# Patient Record
Sex: FEMALE | Race: WHITE | NOT HISPANIC OR LATINO | Employment: OTHER | URBAN - METROPOLITAN AREA
[De-identification: names, ages, dates, MRNs, and addresses within clinical notes are randomized per-mention and may not be internally consistent; named-entity substitution may affect disease eponyms.]

---

## 2020-01-16 ENCOUNTER — OFFICE VISIT (OUTPATIENT)
Dept: CARDIOLOGY | Facility: CLINIC | Age: 70
End: 2020-01-16

## 2020-01-16 VITALS
DIASTOLIC BLOOD PRESSURE: 84 MMHG | WEIGHT: 157.8 LBS | BODY MASS INDEX: 26.94 KG/M2 | HEIGHT: 64 IN | HEART RATE: 69 BPM | SYSTOLIC BLOOD PRESSURE: 140 MMHG

## 2020-01-16 DIAGNOSIS — R00.2 PALPITATIONS: Primary | ICD-10-CM

## 2020-01-16 DIAGNOSIS — R42 DIZZINESS: ICD-10-CM

## 2020-01-16 DIAGNOSIS — R42 ORTHOSTATIC LIGHTHEADEDNESS: ICD-10-CM

## 2020-01-16 PROCEDURE — 93000 ELECTROCARDIOGRAM COMPLETE: CPT | Performed by: INTERNAL MEDICINE

## 2020-01-16 PROCEDURE — 99204 OFFICE O/P NEW MOD 45 MIN: CPT | Performed by: INTERNAL MEDICINE

## 2020-01-16 NOTE — PROGRESS NOTES
Subjective:     Encounter Date:01/16/2020      Patient ID: Lidia Allen is a 69 y.o. female.    Chief Complaint:  History of Present Illness    This is a 69-year-old with a history of frequent PACs, rare PVCs, who presents for evaluation of positional lightheadedness, dizziness, palpitations, and shortness of breath with activity.    The patient was previously followed by Dr. David.  It appears that her issue started in 2/2019 when she was admitted to the hospital with near syncope following plasma donation.  This was felt to be due to orthostatic hypotension.  She was also noted to have PVCs on her EKG at that time so she was transferred to [facility] for further management and work-up.  She was treated with IV fluids and her symptoms improved including her blood pressures.  An echocardiogram was performed showing normal left ventricular systolic function and wall motion with grade 1 diastolic dysfunction and no significant valvular disease.  She then underwent a 48-hour Holter monitor in 3/2019 that showed frequent PACs with episodes of nonsustained ventricular tachycardia, and rare PVCs.  She reports at one point she was tried on atenolol but was unable to tolerate this due to low blood pressures.  She also underwent a stress test that showed evidence of ischemia and subsequently underwent a cardiac catheterization on 4/18/2019 that showed normal coronary arteries and normal left ventricular end-diastolic pressure.  She reports that over the last year she has been aggressive about staying well-hydrated.  She also has been very diligent about monitoring her electrolytes and treating them accordingly.  She is also changed her diet and has become a vegan.      She has noted some worsening dyspnea on exertion over the last year but also admits that she has not been quite as active.  She denies any chest pain.  The palpitations overall have improved.  However she continues to have issues with  lightheadedness primarily when she stands up or standing for a period of time.  She denies any recent syncope.  She also reports that she sometimes has issues with lightheadedness after walking for a period of time associated with a feeling like she is unable to lift her legs to continue walking.  When this happens she has to go lay down for her to recover.  She also reports episodes of dizziness that can occur with just turning her head in different directions.  She believes she gets dehydrated in the middle of the night.  She will wake up with a dry mouth and a headache.  During one such episode she checked her heart rate and oxygen saturation with a pulse oximeter and reports that her heart rates will dip down to 29 and then go back up into the 100s.  She had recent blood work performed showing that her sodium was low at 130, potassium was normal at 3.7, calcium was mildly low at 8.1, and her serum osmolality was low at 254.    Review of Systems   Constitution: Negative for malaise/fatigue.   HENT: Negative for hearing loss, hoarse voice, nosebleeds and sore throat.    Eyes: Negative for pain.   Cardiovascular: Positive for dyspnea on exertion and palpitations. Negative for chest pain, claudication, cyanosis, irregular heartbeat, leg swelling, near-syncope, orthopnea, paroxysmal nocturnal dyspnea and syncope.   Respiratory: Negative for shortness of breath and snoring.    Endocrine: Negative for cold intolerance, heat intolerance, polydipsia, polyphagia and polyuria.   Skin: Negative for itching and rash.   Musculoskeletal: Negative for arthritis, falls, joint pain, joint swelling, muscle cramps, muscle weakness and myalgias.   Gastrointestinal: Negative for constipation, diarrhea, dysphagia, heartburn, hematemesis, hematochezia, melena, nausea and vomiting.   Genitourinary: Negative for frequency, hematuria and hesitancy.   Neurological: Positive for dizziness, light-headedness and loss of balance. Negative for  "excessive daytime sleepiness, headaches, numbness and weakness.   Psychiatric/Behavioral: Negative for depression. The patient is not nervous/anxious.          Current Outpatient Medications:   •  Unable to find, 1 each 1 (One) Time. Med Name: magnesium solution, Disp: , Rfl:   •  Unable to find, 1 each 1 (One) Time. Med Name: recalcia calcium solution, Disp: , Rfl:     Past Medical History:   Diagnosis Date   • Arrhythmia    • Diastolic dysfunction    • Dizziness    • Near syncope    • Orthostatic hypotension    • PVC (premature ventricular contraction)        History reviewed. No pertinent surgical history.    History reviewed. No pertinent family history.    Social History     Tobacco Use   • Smoking status: Never Smoker   • Smokeless tobacco: Never Used   Substance Use Topics   • Alcohol use: Never     Frequency: Never   • Drug use: Never         ECG 12 Lead  Date/Time: 1/16/2020 9:59 AM  Performed by: Cathy Faye MD  Authorized by: Cathy Faye MD   Comparison: compared with previous ECG   Similar to previous ECG  Rhythm: sinus rhythm               Objective:     Visit Vitals  /84   Pulse 69   Ht 162.6 cm (64\")   Wt 71.6 kg (157 lb 12.8 oz)   BMI 27.09 kg/m²         Physical Exam   Constitutional: She is oriented to person, place, and time. She appears well-developed and well-nourished.   HENT:   Head: Normocephalic and atraumatic.   Eyes: Pupils are equal, round, and reactive to light. Conjunctivae, EOM and lids are normal.   Neck: Normal range of motion and full passive range of motion without pain. Neck supple. No JVD present. Carotid bruit is not present.   Cardiovascular: Normal rate, regular rhythm, S1 normal and S2 normal. Exam reveals no gallop.   No murmur heard.  Pulses:       Radial pulses are 2+ on the right side, and 2+ on the left side.   No bilateral lower extremity edema   Pulmonary/Chest: Effort normal and breath sounds normal.   Abdominal: Soft. Normal appearance. "   Lymphadenopathy:     She has no cervical adenopathy.   Neurological: She is alert and oriented to person, place, and time.   Skin: Skin is warm, dry and intact.   Psychiatric: She has a normal mood and affect.       Lab Review:       Assessment:          Diagnosis Plan   1. Palpitations     2. Orthostatic lightheadedness            Plan:       1.  Palpitations.  Symptoms have actually been improving.  At this point I do not think there is any indication to proceed with further monitoring with the improvement in her symptoms.  2.  Orthostatic lightheadedness.  She denies any syncope.  She is also already been aggressively working on hydration and electrolytes but I think some of that may be backfiring.  I suspect that she may be over consuming water which leads to abnormal electrolytes which in turn leads her to start consuming more electrolytes with this then results in the feeling of dehydration and thirstiness leading back to consuming more water.  Some of this is confirmed by her most recent lab work that shows that her sodium and osmolality levels are low.  I recommended that she start backing off on her water consumption.  Asked her to be mindful of her protein intake which can help improve the positional lightheadedness.  I also recommended using compression stockings.  Her symptoms are not severe enough that I would consider any medications such as midodrine  At this time.  3.  Dizziness.  She does report some dizziness with position changes.  I wonder if there is some inner ear issues that are playing a role.  Recommended an ENT evaluation and the patient requested a referral.  4.  Dyspnea and exertion.  I suspect this is due to deconditioning over the last year since she has backed off on some of her activity with the onset of her symptoms.  At this point I recommended that she start increasing her activity.  I think this will help with all her above symptoms.  5.  PACs/PVCs.  Holter monitor earlier last  year showed evidence of frequent PACs.  I think this is primarily responsible for her palpitations.    6.  Bradycardia.  I suspect this was a false reading by her pulse oximeter due to frequent PACs.  No further work-up of this is needed at this time.    At this point we will plan on seeing the patient back again as needed.

## 2020-01-29 ENCOUNTER — TELEPHONE (OUTPATIENT)
Dept: CARDIOLOGY | Facility: CLINIC | Age: 70
End: 2020-01-29

## 2020-01-29 DIAGNOSIS — R42 DIZZINESS: Primary | ICD-10-CM

## 2020-01-29 NOTE — TELEPHONE ENCOUNTER
Patient was seen at [facility] on 1/28 and was dx with a stroke.  She called to day stating that they recommended that she get a bilateral carotid US, and she is asking that you order this.    Records are viewable under care everywhere.  Looks like they only did EKG, MRI of brain & CT of head.    Please advise, or call patient to discuss.    She can be reached at [phone].

## 2020-04-28 ENCOUNTER — HOSPITAL ENCOUNTER (EMERGENCY)
Facility: HOSPITAL | Age: 70
Discharge: HOME | End: 2020-04-28
Attending: EMERGENCY MEDICINE | Admitting: EMERGENCY MEDICINE

## 2020-04-28 VITALS
TEMPERATURE: 98.6 F | RESPIRATION RATE: 18 BRPM | OXYGEN SATURATION: 97 % | WEIGHT: 160 LBS | DIASTOLIC BLOOD PRESSURE: 74 MMHG | SYSTOLIC BLOOD PRESSURE: 113 MMHG | HEIGHT: 63 IN | BODY MASS INDEX: 28.35 KG/M2 | HEART RATE: 65 BPM

## 2020-04-28 DIAGNOSIS — R00.2 HEART PALPITATIONS: Primary | ICD-10-CM

## 2020-04-28 LAB
ALBUMIN SERPL-MCNC: 4.1 G/DL (ref 3.5–5.2)
ALBUMIN/GLOB SERPL: 1.6 G/DL
ALP SERPL-CCNC: 67 U/L (ref 39–117)
ALT SERPL W P-5'-P-CCNC: 17 U/L (ref 1–33)
ANION GAP SERPL CALCULATED.3IONS-SCNC: 11.6 MMOL/L (ref 5–15)
AST SERPL-CCNC: 15 U/L (ref 1–32)
BASOPHILS # BLD AUTO: 0.04 10*3/MM3 (ref 0–0.2)
BASOPHILS NFR BLD AUTO: 0.6 % (ref 0–1.5)
BILIRUB SERPL-MCNC: 0.4 MG/DL (ref 0.2–1.2)
BUN BLD-MCNC: 12 MG/DL (ref 8–23)
BUN/CREAT SERPL: 17.1 (ref 7–25)
CALCIUM SPEC-SCNC: 9.3 MG/DL (ref 8.6–10.5)
CHLORIDE SERPL-SCNC: 102 MMOL/L (ref 98–107)
CO2 SERPL-SCNC: 23.4 MMOL/L (ref 22–29)
CREAT BLD-MCNC: 0.7 MG/DL (ref 0.57–1)
DEPRECATED RDW RBC AUTO: 40.3 FL (ref 37–54)
EOSINOPHIL # BLD AUTO: 0.2 10*3/MM3 (ref 0–0.4)
EOSINOPHIL NFR BLD AUTO: 3.1 % (ref 0.3–6.2)
ERYTHROCYTE [DISTWIDTH] IN BLOOD BY AUTOMATED COUNT: 12.2 % (ref 12.3–15.4)
GFR SERPL CREATININE-BSD FRML MDRD: 83 ML/MIN/1.73
GLOBULIN UR ELPH-MCNC: 2.6 GM/DL
GLUCOSE BLD-MCNC: 101 MG/DL (ref 65–99)
HCT VFR BLD AUTO: 43.3 % (ref 34–46.6)
HGB BLD-MCNC: 14.7 G/DL (ref 12–15.9)
IMM GRANULOCYTES # BLD AUTO: 0.02 10*3/MM3 (ref 0–0.05)
IMM GRANULOCYTES NFR BLD AUTO: 0.3 % (ref 0–0.5)
LYMPHOCYTES # BLD AUTO: 2.15 10*3/MM3 (ref 0.7–3.1)
LYMPHOCYTES NFR BLD AUTO: 33.7 % (ref 19.6–45.3)
MAGNESIUM SERPL-MCNC: 2 MG/DL (ref 1.6–2.4)
MCH RBC QN AUTO: 30.9 PG (ref 26.6–33)
MCHC RBC AUTO-ENTMCNC: 33.9 G/DL (ref 31.5–35.7)
MCV RBC AUTO: 91 FL (ref 79–97)
MONOCYTES # BLD AUTO: 0.48 10*3/MM3 (ref 0.1–0.9)
MONOCYTES NFR BLD AUTO: 7.5 % (ref 5–12)
NEUTROPHILS # BLD AUTO: 3.49 10*3/MM3 (ref 1.7–7)
NEUTROPHILS NFR BLD AUTO: 54.8 % (ref 42.7–76)
NRBC BLD AUTO-RTO: 0 /100 WBC (ref 0–0.2)
PLATELET # BLD AUTO: 191 10*3/MM3 (ref 140–450)
PMV BLD AUTO: 10.6 FL (ref 6–12)
POTASSIUM BLD-SCNC: 3.7 MMOL/L (ref 3.5–5.2)
PROT SERPL-MCNC: 6.7 G/DL (ref 6–8.5)
RBC # BLD AUTO: 4.76 10*6/MM3 (ref 3.77–5.28)
SODIUM BLD-SCNC: 137 MMOL/L (ref 136–145)
WBC NRBC COR # BLD: 6.38 10*3/MM3 (ref 3.4–10.8)

## 2020-04-28 PROCEDURE — 36415 COLL VENOUS BLD VENIPUNCTURE: CPT

## 2020-04-28 PROCEDURE — 93005 ELECTROCARDIOGRAM TRACING: CPT | Performed by: EMERGENCY MEDICINE

## 2020-04-28 PROCEDURE — 93010 ELECTROCARDIOGRAM REPORT: CPT | Performed by: INTERNAL MEDICINE

## 2020-04-28 PROCEDURE — 85025 COMPLETE CBC W/AUTO DIFF WBC: CPT | Performed by: EMERGENCY MEDICINE

## 2020-04-28 PROCEDURE — 99283 EMERGENCY DEPT VISIT LOW MDM: CPT

## 2020-04-28 PROCEDURE — 99284 EMERGENCY DEPT VISIT MOD MDM: CPT | Performed by: EMERGENCY MEDICINE

## 2020-04-28 PROCEDURE — 83735 ASSAY OF MAGNESIUM: CPT | Performed by: EMERGENCY MEDICINE

## 2020-04-28 PROCEDURE — 80053 COMPREHEN METABOLIC PANEL: CPT | Performed by: EMERGENCY MEDICINE

## 2020-05-06 ENCOUNTER — PATIENT MESSAGE (OUTPATIENT)
Dept: CARDIOLOGY | Facility: CLINIC | Age: 70
End: 2020-05-06

## 2020-05-06 ENCOUNTER — TELEPHONE (OUTPATIENT)
Dept: CARDIOLOGY | Facility: CLINIC | Age: 70
End: 2020-05-06

## 2020-05-06 NOTE — TELEPHONE ENCOUNTER
The EKG is not the best way to diagnosis left atrial enlargement.  However her recent echocardiogram from [facility] shows mild left atrial enlargement.  This is a benign finding and often seen in people who have issues with PAT.      I do not know that she needs to see me for her symptoms since she saw both Dr. David and [facility] for her symptoms a couple of months ago and it looks like she had a pretty thorough work up.  I would recommend that she contact them if she is concerned first since they were the last to see her and are familiar with her recent issues.

## 2020-05-06 NOTE — TELEPHONE ENCOUNTER
----- Message from Lidia Allen sent at 5/6/2020  2:46 AM EDT -----  Regarding: Test Results Question  Contact: [phone]  January 16, 2020     ECG  revealed probable L atrial enlargement.  Do you recommend I follow up on this finding?   I am having increased skipped beats in the evenings and some PAT.

## 2020-05-09 ENCOUNTER — HOSPITAL ENCOUNTER (EMERGENCY)
Facility: HOSPITAL | Age: 70
Discharge: HOME | End: 2020-05-09
Attending: EMERGENCY MEDICINE | Admitting: EMERGENCY MEDICINE

## 2020-05-09 VITALS
BODY MASS INDEX: 28.35 KG/M2 | OXYGEN SATURATION: 98 % | TEMPERATURE: 97.8 F | RESPIRATION RATE: 16 BRPM | HEART RATE: 78 BPM | WEIGHT: 160 LBS | SYSTOLIC BLOOD PRESSURE: 130 MMHG | DIASTOLIC BLOOD PRESSURE: 87 MMHG | HEIGHT: 63 IN

## 2020-05-09 DIAGNOSIS — H11.32 SUBCONJUNCTIVAL HEMORRHAGE OF LEFT EYE: Primary | ICD-10-CM

## 2020-05-09 PROCEDURE — 99283 EMERGENCY DEPT VISIT LOW MDM: CPT

## 2020-05-09 PROCEDURE — 99282 EMERGENCY DEPT VISIT SF MDM: CPT | Performed by: EMERGENCY MEDICINE

## 2020-05-09 RX ORDER — PREDNISOLONE ACETATE 10 MG/ML
1 SUSPENSION/ DROPS OPHTHALMIC
Status: DISCONTINUED | OUTPATIENT
Start: 2020-05-09 | End: 2020-05-09 | Stop reason: HOSPADM

## 2020-05-09 RX ADMIN — PREDNISOLONE ACETATE 1 DROP: 10 SUSPENSION/ DROPS OPHTHALMIC at 20:28

## 2020-05-10 NOTE — ED PROVIDER NOTES
EMERGENCY DEPARTMENT ENCOUNTER      Room Number: 4/04      HPI:    Chief complaint: Red irritated eye    Location: Left eye    Quality/Severity: Moderate    Timing/Duration: Symptoms started this morning    Modifying Factors: None    Associated Symptoms: None    Narrative: Pt is a 69 y.o. female who presents complaining of left eye redness and irritation as noted above.  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  Patient proceeded to rub her eye for a short period of time and continued on with her work.  Several hours later a family member noted that the patient had left eye redness.  Patient denies current pain or vision changes.      PMD: Neno Schreiber MD    REVIEW OF SYSTEMS  Review of Systems   Eyes: Positive for redness. Negative for pain, discharge, itching and visual disturbance.   All other systems reviewed and are negative.      PAST MEDICAL HISTORY  Active Ambulatory Problems     Diagnosis Date Noted   • Palpitations 01/16/2020   • Orthostatic lightheadedness 01/16/2020     Resolved Ambulatory Problems     Diagnosis Date Noted   • No Resolved Ambulatory Problems     Past Medical History:   Diagnosis Date   • Arrhythmia    • Diastolic dysfunction    • Dizziness    • Near syncope    • Orthostatic hypotension    • PVC (premature ventricular contraction)        PAST SURGICAL HISTORY  Past Surgical History:   Procedure Laterality Date   • CARPAL TUNNEL RELEASE         FAMILY HISTORY  History reviewed. No pertinent family history.    SOCIAL HISTORY  Social History     Socioeconomic History   • Marital status:      Spouse name: Not on file   • Number of children: Not on file   • Years of education: Not on file   • Highest education level: Not on file   Tobacco Use   • Smoking status: Never Smoker   • Smokeless tobacco: Never Used   Substance and Sexual Activity   • Alcohol use: Never     Frequency: Never   • Drug use:  Never   • Sexual activity: Defer       ALLERGIES  Beta adrenergic blockers    PHYSICAL EXAM  ED Triage Vitals [05/09/20 2012]   Temp Heart Rate Resp BP SpO2   97.8 °F (36.6 °C) 78 16 130/87 98 %      Temp src Heart Rate Source Patient Position BP Location FiO2 (%)   Oral -- -- -- --       Physical Exam   Constitutional: She is oriented to person, place, and time and well-developed, well-nourished, and in no distress.   HENT:   Head: Normocephalic and atraumatic.   Eyes: Pupils are equal, round, and reactive to light. EOM are normal.   Examination of the left showed an obvious subconjunctival hemorrhage to the lateral aspect.  Cornea grossly clear.   Neurological: She is alert and oriented to person, place, and time.   Skin: Skin is warm and dry.   Psychiatric: Affect normal.   Nursing note and vitals reviewed.      LAB RESULTS        I ordered the above labs and reviewed the results    RADIOLOGY  No results found.    I ordered the above radiologic testing and reviewed the results    PROCEDURES  Procedures      PROGRESS AND CONSULTS           MEDICAL DECISION MAKING  Results were reviewed/discussed with the patient and they were also made aware of online access. Pt also made aware that some labs, such as cultures, will not be resulted during ER visit and follow up with PMD is necessary.     MDM       DIAGNOSIS  Final diagnoses:   Subconjunctival hemorrhage of left eye       Latest Documented Vital Signs:  As of 20:29  BP- 130/87 HR- 78 Temp- 97.8 °F (36.6 °C) (Oral) O2 sat- 98%    DISPOSITION  Discharged in good condition       Medication List      No changes were made to your prescriptions during this visit.       Follow-up Information     Neno Schreiber MD.    Specialty:  Family Medicine  Why:  As needed  Contact information:  [address]  [facility] [id number] [phone]                      Amado Webb MD  05/09/20 2030

## 2020-10-15 ENCOUNTER — HOSPITAL ENCOUNTER (EMERGENCY)
Facility: HOSPITAL | Age: 70
Discharge: HOME | End: 2020-10-15
Attending: EMERGENCY MEDICINE | Admitting: EMERGENCY MEDICINE

## 2020-10-15 VITALS
WEIGHT: 160 LBS | OXYGEN SATURATION: 98 % | DIASTOLIC BLOOD PRESSURE: 87 MMHG | HEIGHT: 63 IN | RESPIRATION RATE: 14 BRPM | SYSTOLIC BLOOD PRESSURE: 130 MMHG | BODY MASS INDEX: 28.35 KG/M2 | HEART RATE: 74 BPM | TEMPERATURE: 99 F

## 2020-10-15 DIAGNOSIS — H11.32 SUBCONJUNCTIVAL HEMORRHAGE OF LEFT EYE: Primary | ICD-10-CM

## 2020-10-15 PROCEDURE — 99282 EMERGENCY DEPT VISIT SF MDM: CPT | Performed by: PHYSICIAN ASSISTANT

## 2020-10-15 PROCEDURE — 99283 EMERGENCY DEPT VISIT LOW MDM: CPT

## 2020-10-15 NOTE — ED PROVIDER NOTES
EMERGENCY DEPARTMENT ENCOUNTER      Room Number: D/D    History is provided by the patient, no translation services needed    HPI:    Chief complaint: Eye redness    Location: Left eye    Quality/Severity: Bothersome but not really painful.    Timing/Duration: Yesterday    Modifying Factors: Patient takes a baby aspirin a day, denies any recent straining, bending over, head injuries.    Associated Symptoms: Positive for eye redness and mild tenderness.  Patient denies any blurred vision, difficulty moving the eye, headache, nausea, or vomiting.    Narrative: Pt is a 70 y.o. female who presents complaining of redness of left eye that began yesterday.  She states the bleeding in her eye is slightly bothersome but not really painful.  Her family wanted her to be evaluated because they are concerned.  She states this feels similar to her last subconjunctival hemorrhage that occurred in May.  At that time she had gotten pneumonia in her eye and had been rubbing it which seemed to cause the subconjunctival hemorrhage.  She denies any possibility of foreign body in the eye at this visit.      PMD: Neno Schreiber MD    REVIEW OF SYSTEMS  Review of Systems   Constitutional: Negative for chills and fever.   Eyes: Positive for redness. Negative for photophobia, discharge, itching and visual disturbance.   Respiratory: Negative for cough and shortness of breath.    Cardiovascular: Negative for chest pain and palpitations.   Gastrointestinal: Negative for abdominal pain, nausea and vomiting.   Musculoskeletal: Negative for arthralgias and myalgias.   Skin: Negative for rash and wound.   Neurological: Negative for syncope and headaches.   Psychiatric/Behavioral: Negative for confusion. The patient is not nervous/anxious.          PAST MEDICAL HISTORY  Active Ambulatory Problems     Diagnosis Date Noted   • Palpitations 01/16/2020   • Orthostatic lightheadedness 01/16/2020     Resolved Ambulatory Problems     Diagnosis Date  Noted   • No Resolved Ambulatory Problems     Past Medical History:   Diagnosis Date   • Arrhythmia    • Diastolic dysfunction    • Dizziness    • Hyperlipidemia    • Near syncope    • Orthostatic hypotension    • PVC (premature ventricular contraction)        PAST SURGICAL HISTORY  Past Surgical History:   Procedure Laterality Date   • CARPAL TUNNEL RELEASE         FAMILY HISTORY  History reviewed. No pertinent family history.    SOCIAL HISTORY  Social History     Socioeconomic History   • Marital status:      Spouse name: Not on file   • Number of children: Not on file   • Years of education: Not on file   • Highest education level: Not on file   Tobacco Use   • Smoking status: Never Smoker   • Smokeless tobacco: Never Used   Substance and Sexual Activity   • Alcohol use: Never     Frequency: Never   • Drug use: Never   • Sexual activity: Defer       ALLERGIES  Fosamax [alendronate] and Beta adrenergic blockers    No current facility-administered medications for this encounter.     Current Outpatient Medications:   •  dilTIAZem (CARDIZEM) 30 MG tablet, Take 30 mg by mouth 3 (Three) Times a Day., Disp: , Rfl:   •  Unable to find, 1 each 1 (One) Time. Med Name: magnesium solution, Disp: , Rfl:   •  Unable to find, 1 each 1 (One) Time. Med Name: recalcia calcium solution, Disp: , Rfl:     PHYSICAL EXAM  ED Triage Vitals [10/15/20 1655]   Temp Heart Rate Resp BP SpO2   99 °F (37.2 °C) 74 14 130/87 98 %      Temp src Heart Rate Source Patient Position BP Location FiO2 (%)   Oral -- -- -- --       Physical Exam   Constitutional: She is oriented to person, place, and time and well-developed, well-nourished, and in no distress.   HENT:   Head: Normocephalic and atraumatic.   Mouth/Throat: Mucous membranes are normal.   Eyes: Right eye visual fields normal and left eye visual fields normal. Pupils are equal, round, and reactive to light. EOM are normal. Left eye exhibits no discharge. No foreign body present in the  left eye. Right conjunctiva has no hemorrhage. Left conjunctiva has a hemorrhage (Subconjunctival hemorrhage to left eye with small hematoma formation in the inferior aspect.).   Corneas are clear, funduscopic exam is normal.   Cardiovascular: Normal rate, regular rhythm and intact distal pulses.   Pulmonary/Chest: Effort normal. No respiratory distress.   Musculoskeletal: Normal range of motion.         General: No edema.   Neurological: She is alert and oriented to person, place, and time. GCS score is 15.   Skin: Skin is warm and dry.   Psychiatric: Mood, memory, affect and judgment normal.   Nursing note and vitals reviewed.        LAB RESULTS  Lab Results (last 24 hours)     ** No results found for the last 24 hours. **            I ordered the above labs and reviewed the results    RADIOLOGY  No Radiology Exams Resulted Within Past 24 Hours    I ordered the above radiologic testing and reviewed the results    PROCEDURES  Procedures      PROGRESS AND CONSULTS  ED Course as of Oct 15 1747   Thu Oct 15, 2020   1735 Discussed diagnosis of subconjunctival conjunctival hemorrhage with patient. Recommended follow-up with Jolene for eye exam since she has not had a routine eye exam recently.  Recommended return to the ER with any worsening or emergent symptoms.  Patient verbalizes understanding and is agreeable with this plan.    [KS]      ED Course User Index  [KS] Mell Nava PA-C           MEDICAL DECISION MAKING  Results were reviewed/discussed with the patient and they were also made aware of online access. Pt also made aware that some labs, such as cultures, will not be resulted during ER visit and follow up with PMD is necessary.     MDM       DIAGNOSIS  Final diagnoses:   Subconjunctival hemorrhage of left eye       Latest Documented Vital Signs:  As of 17:47 EDT  BP- 130/87 HR- 74 Temp- 99 °F (37.2 °C) (Oral) O2 sat- 98%    DISPOSITION  Patient discharged home.    Patient/Family voiced  understanding of need to follow-up for recheck, further testing as needed.  Return to the emergency Department warnings were given.         Medication List      No changes were made to your prescriptions during this visit.             Follow-up Information     Neno Schreiber MD.    Specialty: Family Medicine  Why: As needed  Contact information:  [address]  [facility] [address]  [phone]             [facility]. Call in 1 day.    Why: To schedule follow-up appointment  Contact information:  [address] [place] [id number]  [phone]                   Dictated utilizing Dragon dictation     Mell Nava PA-C  10/15/20 8280

## 2021-02-08 ENCOUNTER — HOSPITAL ENCOUNTER (EMERGENCY)
Facility: HOSPITAL | Age: 71
Discharge: HOME | End: 2021-02-08
Attending: EMERGENCY MEDICINE | Admitting: EMERGENCY MEDICINE

## 2021-02-08 VITALS
OXYGEN SATURATION: 99 % | RESPIRATION RATE: 16 BRPM | SYSTOLIC BLOOD PRESSURE: 116 MMHG | TEMPERATURE: 98.7 F | WEIGHT: 175 LBS | DIASTOLIC BLOOD PRESSURE: 80 MMHG | BODY MASS INDEX: 31.01 KG/M2 | HEIGHT: 63 IN | HEART RATE: 99 BPM

## 2021-02-08 DIAGNOSIS — I87.2 VENOUS INSUFFICIENCY OF BOTH LOWER EXTREMITIES: Primary | ICD-10-CM

## 2021-02-08 LAB
ALBUMIN SERPL-MCNC: 4 G/DL (ref 3.5–5.2)
ALBUMIN/GLOB SERPL: 1.6 G/DL
ALP SERPL-CCNC: 52 U/L (ref 39–117)
ALT SERPL W P-5'-P-CCNC: 21 U/L (ref 1–33)
ANION GAP SERPL CALCULATED.3IONS-SCNC: 9.8 MMOL/L (ref 5–15)
AST SERPL-CCNC: 15 U/L (ref 1–32)
BACTERIA UR QL AUTO: ABNORMAL /HPF
BASOPHILS # BLD AUTO: 0.06 10*3/MM3 (ref 0–0.2)
BASOPHILS NFR BLD AUTO: 0.6 % (ref 0–1.5)
BILIRUB SERPL-MCNC: 0.3 MG/DL (ref 0–1.2)
BILIRUB UR QL STRIP: NEGATIVE
BUN SERPL-MCNC: 15 MG/DL (ref 8–23)
BUN/CREAT SERPL: 23.1 (ref 7–25)
CALCIUM SPEC-SCNC: 8.8 MG/DL (ref 8.6–10.5)
CHLORIDE SERPL-SCNC: 102 MMOL/L (ref 98–107)
CLARITY UR: CLEAR
CO2 SERPL-SCNC: 25.2 MMOL/L (ref 22–29)
COLOR UR: ABNORMAL
CREAT SERPL-MCNC: 0.65 MG/DL (ref 0.57–1)
DEPRECATED RDW RBC AUTO: 44.1 FL (ref 37–54)
EOSINOPHIL # BLD AUTO: 0.09 10*3/MM3 (ref 0–0.4)
EOSINOPHIL NFR BLD AUTO: 1 % (ref 0.3–6.2)
ERYTHROCYTE [DISTWIDTH] IN BLOOD BY AUTOMATED COUNT: 12.9 % (ref 12.3–15.4)
GFR SERPL CREATININE-BSD FRML MDRD: 90 ML/MIN/1.73
GLOBULIN UR ELPH-MCNC: 2.5 GM/DL
GLUCOSE SERPL-MCNC: 121 MG/DL (ref 65–99)
GLUCOSE UR STRIP-MCNC: NEGATIVE MG/DL
HCT VFR BLD AUTO: 43.2 % (ref 34–46.6)
HGB BLD-MCNC: 14.1 G/DL (ref 12–15.9)
HGB UR QL STRIP.AUTO: ABNORMAL
HYALINE CASTS UR QL AUTO: ABNORMAL /LPF
IMM GRANULOCYTES # BLD AUTO: 0.03 10*3/MM3 (ref 0–0.05)
IMM GRANULOCYTES NFR BLD AUTO: 0.3 % (ref 0–0.5)
KETONES UR QL STRIP: NEGATIVE
LEUKOCYTE ESTERASE UR QL STRIP.AUTO: NEGATIVE
LIPASE SERPL-CCNC: 29 U/L (ref 13–60)
LYMPHOCYTES # BLD AUTO: 1.44 10*3/MM3 (ref 0.7–3.1)
LYMPHOCYTES NFR BLD AUTO: 15.5 % (ref 19.6–45.3)
MCH RBC QN AUTO: 30.5 PG (ref 26.6–33)
MCHC RBC AUTO-ENTMCNC: 32.6 G/DL (ref 31.5–35.7)
MCV RBC AUTO: 93.5 FL (ref 79–97)
MONOCYTES # BLD AUTO: 0.57 10*3/MM3 (ref 0.1–0.9)
MONOCYTES NFR BLD AUTO: 6.1 % (ref 5–12)
NEUTROPHILS NFR BLD AUTO: 7.13 10*3/MM3 (ref 1.7–7)
NEUTROPHILS NFR BLD AUTO: 76.5 % (ref 42.7–76)
NITRITE UR QL STRIP: NEGATIVE
NRBC BLD AUTO-RTO: 0 /100 WBC (ref 0–0.2)
NT-PROBNP SERPL-MCNC: 55.3 PG/ML (ref 0–900)
PH UR STRIP.AUTO: 7 [PH] (ref 4.5–8)
PLATELET # BLD AUTO: 220 10*3/MM3 (ref 140–450)
PMV BLD AUTO: 10.5 FL (ref 6–12)
POTASSIUM SERPL-SCNC: 3.7 MMOL/L (ref 3.5–5.2)
PROT SERPL-MCNC: 6.5 G/DL (ref 6–8.5)
PROT UR QL STRIP: NEGATIVE
RBC # BLD AUTO: 4.62 10*6/MM3 (ref 3.77–5.28)
RBC # UR: ABNORMAL /HPF
REF LAB TEST METHOD: ABNORMAL
SODIUM SERPL-SCNC: 137 MMOL/L (ref 136–145)
SP GR UR STRIP: 1.01 (ref 1–1.03)
SQUAMOUS #/AREA URNS HPF: ABNORMAL /HPF
TROPONIN T SERPL-MCNC: <0.01 NG/ML (ref 0–0.03)
UROBILINOGEN UR QL STRIP: ABNORMAL
WBC # BLD AUTO: 9.32 10*3/MM3 (ref 3.4–10.8)
WBC UR QL AUTO: ABNORMAL /HPF

## 2021-02-08 PROCEDURE — 80053 COMPREHEN METABOLIC PANEL: CPT | Performed by: EMERGENCY MEDICINE

## 2021-02-08 PROCEDURE — 85025 COMPLETE CBC W/AUTO DIFF WBC: CPT | Performed by: EMERGENCY MEDICINE

## 2021-02-08 PROCEDURE — 83880 ASSAY OF NATRIURETIC PEPTIDE: CPT | Performed by: EMERGENCY MEDICINE

## 2021-02-08 PROCEDURE — 99283 EMERGENCY DEPT VISIT LOW MDM: CPT | Performed by: EMERGENCY MEDICINE

## 2021-02-08 PROCEDURE — 93005 ELECTROCARDIOGRAM TRACING: CPT | Performed by: EMERGENCY MEDICINE

## 2021-02-08 PROCEDURE — 83690 ASSAY OF LIPASE: CPT | Performed by: EMERGENCY MEDICINE

## 2021-02-08 PROCEDURE — 84484 ASSAY OF TROPONIN QUANT: CPT | Performed by: EMERGENCY MEDICINE

## 2021-02-08 PROCEDURE — 81001 URINALYSIS AUTO W/SCOPE: CPT | Performed by: EMERGENCY MEDICINE

## 2021-02-08 PROCEDURE — 99283 EMERGENCY DEPT VISIT LOW MDM: CPT

## 2021-02-08 PROCEDURE — 93010 ELECTROCARDIOGRAM REPORT: CPT | Performed by: INTERNAL MEDICINE

## 2021-02-08 RX ORDER — SODIUM CHLORIDE 0.9 % (FLUSH) 0.9 %
10 SYRINGE (ML) INJECTION AS NEEDED
Status: DISCONTINUED | OUTPATIENT
Start: 2021-02-08 | End: 2021-02-08 | Stop reason: HOSPADM

## 2021-02-08 NOTE — ED PROVIDER NOTES
"Subjective     History provided by:  Patient    History of Present Illness    · Chief complaint: Water retention and abdominal bloating.    · Location: The patient states that her legs and abdomen are swollen.    · Quality/Severity: The patient states she has had a 25 pound weight gain since \"the last time I was weighed\".  She states her abdomen feels swollen and she \"looks pregnant\".  She also reports that her legs feel swollen.    · Timing/Onset: She states that her legs and abdomen swelling over the last 3 days.    · Modifying Factors: None known    · Associated symptoms: Denies any abdominal pain.  Denies shortness of breath.    · Narrative: The patient is a 70-year-old white female complaining of \"retaining fluid\".  She states her abdomen is bloating and that her legs are swollen and that she has gained 25 pounds.  She states that she gains \"a couple of pounds every day\".  She states that she has felt a little lightheaded and nauseous so she has been drinking a lot of water.    Review of Systems   Constitutional: Negative for appetite change, fatigue and fever.   HENT: Negative for congestion, ear pain, rhinorrhea, sore throat and voice change.    Eyes: Negative for photophobia, pain and visual disturbance.   Respiratory: Negative for cough and shortness of breath.    Cardiovascular: Positive for leg swelling.   Gastrointestinal: Positive for abdominal pain and nausea.   Endocrine: Positive for polydipsia. Negative for polyuria.   Genitourinary: Negative for dysuria, flank pain, frequency, pelvic pain and urgency.   Musculoskeletal: Negative for back pain, gait problem, neck pain and neck stiffness.   Skin: Negative for color change and rash.   Neurological: Positive for dizziness and light-headedness. Negative for seizures and weakness.   Psychiatric/Behavioral: Negative for confusion.     Past Medical History:   Diagnosis Date   • SABAS (acute kidney injury) (CMS/[facility])     2019   • Arrhythmia    • Diastolic " "dysfunction    • Dizziness    • Hyperlipidemia    • Near syncope    • Orthostatic hypotension    • Osteoporosis    • PVC (premature ventricular contraction)    • Sleep apnea    • Stroke (CMS/[facility])      /80   Pulse 99   Temp 98.7 °F (37.1 °C) (Oral)   Resp 16   Ht 160 cm (63\")   Wt 79.4 kg (175 lb)   SpO2 99%   BMI 31.00 kg/m²     Past Medical History:   Diagnosis Date   • SABAS (acute kidney injury) (CMS/[facility])     2019   • Arrhythmia    • Diastolic dysfunction    • Dizziness    • Hyperlipidemia    • Near syncope    • Orthostatic hypotension    • Osteoporosis    • PVC (premature ventricular contraction)    • Sleep apnea    • Stroke (CMS/[facility])        Allergies   Allergen Reactions   • Caffeine Palpitations     SVT...    • Cephalexin Palpitations   • Cyclobenzaprine Unknown - Low Severity     Wakes up \"gasping for air\"   • Naproxen Other (See Comments) and Palpitations     Patient wakes up \"gasping for air\"   • Fosamax [Alendronate] Rash   • Beta Adrenergic Blockers Other (See Comments)     Breathing problems     • Diltiazem Other (See Comments)     Pt reports more dysrhythmias with this medication, but she took 40mg this AM.     • Sugar-Protein-Starch Unknown - Low Severity   • Ranitidine Other (See Comments)     Patient doesn't know if she has a reaction to this medication, she was taking this with the other medications that were causing her issues.       Past Surgical History:   Procedure Laterality Date   • CARPAL TUNNEL RELEASE         History reviewed. No pertinent family history.    Social History     Socioeconomic History   • Marital status:      Spouse name: Not on file   • Number of children: Not on file   • Years of education: Not on file   • Highest education level: Not on file   Tobacco Use   • Smoking status: Never Smoker   • Smokeless tobacco: Never Used   Substance and Sexual Activity   • Alcohol use: Never     Frequency: Never   • Drug use: Never   • Sexual activity: Defer "           Objective   Physical Exam  Vitals signs and nursing note reviewed.   Constitutional:       General: She is not in acute distress.     Appearance: She is well-developed. She is not diaphoretic.      Comments: The patient appears healthy in no acute distress.  Review of her vital signs: She is afebrile with a temperature 90.7, respirations are normal 16 with a normal room air oxygen saturation 99%, slightly tachycardic with a heart rate of 99, blood pressure slightly elevated 150/79.   HENT:      Head: Normocephalic and atraumatic.      Nose: Nose normal.      Mouth/Throat:      Pharynx: No oropharyngeal exudate.   Eyes:      General: No scleral icterus.        Right eye: No discharge.         Left eye: No discharge.      Pupils: Pupils are equal, round, and reactive to light.   Neck:      Musculoskeletal: Normal range of motion and neck supple.      Thyroid: No thyromegaly.      Vascular: No JVD.   Cardiovascular:      Rate and Rhythm: Normal rate and regular rhythm.      Heart sounds: Normal heart sounds. No murmur.   Pulmonary:      Effort: Pulmonary effort is normal.      Breath sounds: Normal breath sounds. No wheezing or rales.   Chest:      Chest wall: No tenderness.   Abdominal:      General: Bowel sounds are normal. There is no distension.      Palpations: Abdomen is soft.      Tenderness: There is no abdominal tenderness. There is no right CVA tenderness, left CVA tenderness, guarding or rebound. Negative signs include Salas's sign and McBurney's sign.      Comments: Patient is no twan distention abdomen.  It is soft and flabby consistent with obesity.   Musculoskeletal: Normal range of motion.         General: No tenderness or deformity.   Lymphadenopathy:      Cervical: No cervical adenopathy.   Skin:     General: Skin is warm and dry.      Capillary Refill: Capillary refill takes less than 2 seconds.      Findings: No rash.   Neurological:      General: No focal deficit present.      Mental  Status: She is alert and oriented to person, place, and time.      Cranial Nerves: No cranial nerve deficit.      Coordination: Coordination normal.      Comments: No focal motor sensory deficit   Psychiatric:         Behavior: Behavior normal.         Thought Content: Thought content normal.         Judgment: Judgment normal.         Procedures           ED Course  ED Course as of Feb 08 1553   Mon Feb 08, 2021   1446 My interpretation of the patient's EKG tracing performed 14: 09 is normal sinus rhythm with a rate of 73, normal axis, normal conduction, no acute ST segment elevation or depression consistent with ischemia, no ectopy, normal HI and QT intervals, normal EKG.    [TP]   1452 Review the patient's laboratory studies: Her urinalysis has microscopic hematuria but is negative for infection.  CMP has a slightly elevated glucose of 121, otherwise normal electrolytes, normal renal and liver function test.  Cardiac troponin negative.  Lipase normal.  CBC has a normal white count of 9.32 with a slight left shift.  Hemoglobin, hematocrit and platelets within normal limits.  Her proBNP was normal.    [TP]   1552 Is my impression the patient has mild pedal edema and lower extremity edema secondary to venous insufficiency.  She is instructed to keep her legs elevated.  She is instructed to follow-up with her PCP Dr. Schreiber.    [TP]      ED Course User Index  [TP] Anthony Hernandez MD                                           MDM  Number of Diagnoses or Management Options  Venous insufficiency of both lower extremities: new and requires workup     Amount and/or Complexity of Data Reviewed  Clinical lab tests: reviewed and ordered    Risk of Complications, Morbidity, and/or Mortality  Presenting problems: moderate  Diagnostic procedures: moderate  Management options: moderate    Patient Progress  Patient progress: stable      Final diagnoses:   Venous insufficiency of both lower extremities           Labs Reviewed    COMPREHENSIVE METABOLIC PANEL - Abnormal; Notable for the following components:       Result Value    Glucose 121 (*)     All other components within normal limits    Narrative:     GFR Normal >60  Chronic Kidney Disease <60  Kidney Failure <15     URINALYSIS W/ MICROSCOPIC IF INDICATED (NO CULTURE) - Abnormal; Notable for the following components:    Blood, UA Trace (*)     All other components within normal limits   CBC WITH AUTO DIFFERENTIAL - Abnormal; Notable for the following components:    Neutrophil % 76.5 (*)     Lymphocyte % 15.5 (*)     Neutrophils, Absolute 7.13 (*)     All other components within normal limits   URINALYSIS, MICROSCOPIC ONLY - Abnormal; Notable for the following components:    RBC, UA 0-2 (*)     All other components within normal limits   LIPASE - Normal   TROPONIN (IN-HOUSE) - Normal    Narrative:     Troponin T Reference Range:  <= 0.03 ng/mL-   Negative for AMI  >0.03 ng/mL-     Abnormal for myocardial necrosis.  Clinicians would have to utilize clinical acumen, EKG, Troponin and serial changes to determine if it is an Acute Myocardial Infarction or myocardial injury due to an underlying chronic condition.       Results may be falsely decreased if patient taking Biotin.     BNP (IN-HOUSE) - Normal    Narrative:     Among patients with dyspnea, NT-proBNP is highly sensitive for the detection of acute congestive heart failure. In addition NT-proBNP of <300 pg/ml effectively rules out acute congestive heart failure with 99% negative predictive value.    Results may be falsely decreased if patient taking Biotin.     CBC AND DIFFERENTIAL    Narrative:     The following orders were created for panel order CBC & Differential.  Procedure                               Abnormality         Status                     ---------                               -----------         ------                     CBC Auto Differential[[id number]]        Abnormal            Final result                  Please view results for these tests on the individual orders.     No orders to display          Medication List      No changes were made to your prescriptions during this visit.              Antohny Hernandez MD  02/08/21 9420

## 2021-02-09 LAB — QT INTERVAL: 377 MS

## 2021-04-03 ENCOUNTER — HOSPITAL ENCOUNTER (EMERGENCY)
Facility: HOSPITAL | Age: 71
Discharge: HOME | End: 2021-04-03
Attending: EMERGENCY MEDICINE | Admitting: EMERGENCY MEDICINE

## 2021-04-03 ENCOUNTER — APPOINTMENT (OUTPATIENT)
Dept: CT IMAGING | Facility: HOSPITAL | Age: 71
End: 2021-04-03

## 2021-04-03 VITALS
WEIGHT: 175 LBS | HEART RATE: 68 BPM | SYSTOLIC BLOOD PRESSURE: 133 MMHG | BODY MASS INDEX: 31.01 KG/M2 | HEIGHT: 63 IN | RESPIRATION RATE: 16 BRPM | OXYGEN SATURATION: 98 % | DIASTOLIC BLOOD PRESSURE: 88 MMHG | TEMPERATURE: 97.9 F

## 2021-04-03 DIAGNOSIS — M54.41 ACUTE RIGHT-SIDED LOW BACK PAIN WITH RIGHT-SIDED SCIATICA: Primary | ICD-10-CM

## 2021-04-03 PROCEDURE — 99283 EMERGENCY DEPT VISIT LOW MDM: CPT | Performed by: EMERGENCY MEDICINE

## 2021-04-03 PROCEDURE — 63710000001 PREDNISONE PER 1 MG: Performed by: EMERGENCY MEDICINE

## 2021-04-03 PROCEDURE — 72131 CT LUMBAR SPINE W/O DYE: CPT

## 2021-04-03 PROCEDURE — 99283 EMERGENCY DEPT VISIT LOW MDM: CPT

## 2021-04-03 RX ORDER — PREDNISONE 10 MG/1
TABLET ORAL
Qty: 21 TABLET | Refills: 0 | OUTPATIENT
Start: 2021-04-04 | End: 2021-08-03

## 2021-04-03 RX ORDER — PREDNISONE 20 MG/1
60 TABLET ORAL ONCE
Status: COMPLETED | OUTPATIENT
Start: 2021-04-03 | End: 2021-04-03

## 2021-04-03 RX ADMIN — PREDNISONE 60 MG: 20 TABLET ORAL at 14:56

## 2021-04-03 NOTE — ED PROVIDER NOTES
Subjective   Lidia Allen is a 70-year-old white female who presents secondary to sudden onset right-sided low back pain this morning.  Patient states she was working at her desk.  She leaned forward and felt a sudden pop in her low back.  This was followed immediately by significant pain.  The pain has been constant and unrelenting.  Worsened by movement of the patient's torso as well as movement of the right leg.  Patient also reports pain her right calf as well as pain in the dorsal aspect of her right foot.  This started a few minutes after her back pain.  Patient reports episodic back pain in the past.  History of osteoporosis.  No prior back surgeries.  No significant back injuries.  Patient presents for evaluation.      History provided by:  Patient      Review of Systems   Constitutional: Negative.  Negative for fever.   HENT: Negative.  Negative for rhinorrhea.    Eyes: Negative.  Negative for redness.   Respiratory: Negative for cough.    Cardiovascular: Negative for chest pain.   Gastrointestinal: Negative for abdominal pain.   Endocrine: Negative.    Genitourinary: Negative.  Negative for difficulty urinating.   Musculoskeletal: Positive for back pain (Episodic pain over the past several years.  Patient attributes this to age and osteopenia).   Skin: Negative.  Negative for color change.   Neurological: Negative.  Negative for syncope.   Hematological: Negative.    Psychiatric/Behavioral: Negative.    All other systems reviewed and are negative.      Past Medical History:   Diagnosis Date   • SABAS (acute kidney injury) (CMS/[facility])     2019   • Arrhythmia    • Diastolic dysfunction    • Dizziness    • Hyperlipidemia    • Near syncope    • Orthostatic hypotension    • Osteoporosis    • PVC (premature ventricular contraction)    • Sleep apnea    • Stroke (CMS/[facility])        Allergies   Allergen Reactions   • Caffeine Palpitations     SVT...    • Cephalexin Palpitations   • Cyclobenzaprine Unknown - Low  "Severity     Wakes up \"gasping for air\"   • Naproxen Palpitations and Other (See Comments)     Patient wakes up \"gasping for air\".  Kidney injury   • Fosamax [Alendronate] Rash   • Beta Adrenergic Blockers Other (See Comments)     Breathing problems     • Diltiazem Other (See Comments)     Pt reports more dysrhythmias with this medication, but she took 40mg this AM.     • Sugar-Protein-Starch Unknown - Low Severity   • Ranitidine Other (See Comments)     Patient doesn't know if she has a reaction to this medication, she was taking this with the other medications that were causing her issues.       Past Surgical History:   Procedure Laterality Date   • CARPAL TUNNEL RELEASE         History reviewed. No pertinent family history.    Social History     Socioeconomic History   • Marital status:      Spouse name: Not on file   • Number of children: Not on file   • Years of education: Not on file   • Highest education level: Not on file   Tobacco Use   • Smoking status: Never Smoker   • Smokeless tobacco: Never Used   Substance and Sexual Activity   • Alcohol use: Never   • Drug use: Never   • Sexual activity: Defer           Objective   Physical Exam  Vitals and nursing note reviewed.   Constitutional:       General: She is not in acute distress.     Appearance: Normal appearance. She is well-developed. She is not diaphoretic.      Comments: 70-year-old white female sitting on side of the bed.  Patient is a bit overweight.  She otherwise appears in good health.  Vital signs unremarkable.  Patient is sitting with most of her weight shifted onto her left buttock.  The head of the bed is elevated to near 90 degrees.  Patient is leaning on a pillow which is wedged under her right arm, between her torso and the head of the bed.  This is a position of comfort for the patient.   HENT:      Head: Normocephalic and atraumatic.      Right Ear: Tympanic membrane, ear canal and external ear normal.      Left Ear: Tympanic " membrane, ear canal and external ear normal.      Nose: Nose normal.      Mouth/Throat:      Mouth: Mucous membranes are moist.      Pharynx: Oropharynx is clear.   Eyes:      Extraocular Movements: Extraocular movements intact.      Conjunctiva/sclera: Conjunctivae normal.      Pupils: Pupils are equal, round, and reactive to light.   Cardiovascular:      Rate and Rhythm: Normal rate and regular rhythm.      Pulses: Normal pulses.      Heart sounds: Normal heart sounds. No murmur heard.   No friction rub. No gallop.    Pulmonary:      Effort: Pulmonary effort is normal.      Breath sounds: Normal breath sounds.   Abdominal:      General: Bowel sounds are normal. There is no distension.      Palpations: Abdomen is soft.      Tenderness: There is no abdominal tenderness.   Musculoskeletal:         General: Signs of injury present.      Cervical back: Normal, normal range of motion and neck supple.      Thoracic back: Normal.      Lumbar back: No swelling, edema, deformity, tenderness or bony tenderness. Decreased range of motion. Positive right straight leg raise test.   Skin:     General: Skin is warm and dry.   Neurological:      General: No focal deficit present.      Mental Status: She is alert and oriented to person, place, and time.      Deep Tendon Reflexes: Reflexes are normal and symmetric.   Psychiatric:         Mood and Affect: Mood normal.         Behavior: Behavior normal.         Procedures           ED Course  ED Course as of Apr 03 1456   Sat Apr 03, 2021   1351 Obtaining CT of L-spine.  Symptoms concerning for acute disc herniation.  Patient also has a history of osteoporosis.  Patient declined offer for pain medication, muscle relaxants and steroids at this time.       [SS]   1439 CT shows moderate facet arthropathy as well as small bulging disc at multiple levels.  The disc or bulging posteriorly.  No evidence of any high-grade stenosis.      [SS]   1452 Discussed at length with patient results,  "diagnosis, treatment and follow-up.  Patient does not want narcotics nor muscle relaxants.  She had an adverse reaction to cyclobenzaprine.  Patient also had kidney injury secondary to naproxen.  Thus will only be prescribing steroids.  Patient plans to follow-up with Dr. Schreiber.  Giving neurosurgery for follow-up if symptoms should persist and/or worsen.  Will DC home.Prescriptions1-prednisone Dosepak    [SS]      ED Course User Index  [SS] Maicol Chappell MD      CT Lumbar Spine Without Contrast    Result Date: 4/3/2021  Narrative: CT Spine Lumbar WO INDICATION:  70-year-old female with low back pain after leaning forward this morning and hearing a \"pop\". Osteoporosis. TECHNIQUE: CT of the lumbar spine without IV contrast. Coronal and sagittal reconstructions were obtained.  Radiation dose reduction techniques included automated exposure control or exposure modulation based on body size. Count of known CT and cardiac nuc med studies performed in previous 12 months: 0. COMPARISON:  None available. FINDINGS: No acute fracture or subluxation. Vertebral body heights and alignment are normally maintained. Disc spaces are within normal limits. Mild to moderate multilevel facet degeneration. Sacrum and SI joints are intact. Small posterior disc bulges noted at multiple levels. No high-grade central canal stenosis. Paravertebral soft tissues are unremarkable.     Impression: 1. No acute lumbar spine injury. 2. Mild to moderate multilevel facet arthropathy. 3. Small posterior disc bulges noted at multiple levels. No high-grade central canal stenosis. Signer Name: Scott Mitchell MD  Signed: 4/3/2021 2:27 PM  Workstation [facility]: [email or web address]    My differential diagnosis for back pain includes but is not limited to:  Musculoskeletal strain, contusion, retroperitoneal hematoma, disc protrusion, vertebral fracture, transverse process fracture, rib fracture, facet syndrome, sacroiliac joint " strain, sciatica, renal injury, splenic injury, pancreatic injury, osteoarthritis, lumbar spondylosis, spinal stenosis, ankylosing spondylitis, sacroiliac joint inflammation, pancreatitis, perforated peptic ulcer, diverticulitis, endometriosis, chronic PID, epidural abscess, osteomyelitis, retroperitoneal abscess, pyelonephritis, pneumonia, subphrenic abscess, tuberculosis, neurofibroma, meningioma, multiple myeloma, lymphoma, metastatic cancer, primary cancer, AAA, aortic dissection, spinal ischemia, referred pain, ureterolithiasis    My diagnosis for lower extremity pain and injury includes but is not limited to hip fracture, femur fracture, hip dislocation, hip contusion, hip sprain, hip strain, pelvic fracture, knee sprain, patella dislocation, knee dislocation, internal derangement of knee, fractures of the femur, tibia, fibula, ankle, foot and digits, ankle sprain, ankle dislocation, Lisfranc fracture, fracture dislocations of the digits, pulmonary embolism, claudication, peripheral vascular disease, gout, osteoarthritis, rheumatoid arthritis, bursitis, septic joint, poly-rheumatica, polyarthralgia and other inflammatory or infectious disease processes.                                       MDM    Final diagnoses:   Acute right-sided low back pain with right-sided sciatica       ED Disposition  ED Disposition     ED Disposition Condition Comment    Discharge Stable           Neno Schreiber MD  [address] [facility] [address] [phone]    Schedule an appointment as soon as possible for a visit in 1 week  Sooner if needed    Amado Bain MD  [facility] [place] [id number] [phone]    Schedule an appointment as soon as possible for a visit in 2 weeks  for continued or worsened symptoms, Sooner if needed         Medication List      New Prescriptions    predniSONE 10 MG tablet  Commonly known as: DELTASONE  6 tabs by mouth day 1, 5 tabs by mouth day 2, continue tapering one  tablet daily: Coarse 6 days.  Start taking on: April 4, 2021           Where to Get Your Medications      [facility] [place] [address] AT [facility] [address] [facility] - [phone] [facility] [phone] [address] [address]    Phone: [phone]   · predniSONE 10 MG tablet          Maicol Chappell MD  04/03/21 1302

## 2021-04-03 NOTE — DISCHARGE INSTRUCTIONS
Medication as directed.  Tylenol for pain.  Apply heat to affected area.  Gentle stretching.  Follow-up with your PCP as above.  Follow-up with neurosurgery for prolonged or worsening symptoms.  Return to emergency room for worsening symptoms, medical emergencies.

## 2021-08-03 ENCOUNTER — HOSPITAL ENCOUNTER (EMERGENCY)
Facility: HOSPITAL | Age: 71
Discharge: HOME | End: 2021-08-03
Attending: EMERGENCY MEDICINE | Admitting: EMERGENCY MEDICINE

## 2021-08-03 VITALS
HEIGHT: 63 IN | SYSTOLIC BLOOD PRESSURE: 132 MMHG | BODY MASS INDEX: 31.01 KG/M2 | OXYGEN SATURATION: 94 % | WEIGHT: 175 LBS | RESPIRATION RATE: 16 BRPM | DIASTOLIC BLOOD PRESSURE: 86 MMHG | HEART RATE: 72 BPM | TEMPERATURE: 98 F

## 2021-08-03 DIAGNOSIS — R42 DIZZINESS: Primary | ICD-10-CM

## 2021-08-03 LAB
ALBUMIN SERPL-MCNC: 4.3 G/DL (ref 3.5–5.2)
ALBUMIN/GLOB SERPL: 2 G/DL
ALP SERPL-CCNC: 44 U/L (ref 39–117)
ALT SERPL W P-5'-P-CCNC: 16 U/L (ref 1–33)
ANION GAP SERPL CALCULATED.3IONS-SCNC: 7.5 MMOL/L (ref 5–15)
AST SERPL-CCNC: 13 U/L (ref 1–32)
BASOPHILS # BLD AUTO: 0.04 10*3/MM3 (ref 0–0.2)
BASOPHILS NFR BLD AUTO: 0.5 % (ref 0–1.5)
BILIRUB SERPL-MCNC: 0.3 MG/DL (ref 0–1.2)
BUN SERPL-MCNC: 18 MG/DL (ref 8–23)
BUN/CREAT SERPL: 27.7 (ref 7–25)
CALCIUM SPEC-SCNC: 9.5 MG/DL (ref 8.6–10.5)
CHLORIDE SERPL-SCNC: 97 MMOL/L (ref 98–107)
CO2 SERPL-SCNC: 28.5 MMOL/L (ref 22–29)
CREAT SERPL-MCNC: 0.65 MG/DL (ref 0.57–1)
DEPRECATED RDW RBC AUTO: 41.6 FL (ref 37–54)
EOSINOPHIL # BLD AUTO: 0.11 10*3/MM3 (ref 0–0.4)
EOSINOPHIL NFR BLD AUTO: 1.4 % (ref 0.3–6.2)
ERYTHROCYTE [DISTWIDTH] IN BLOOD BY AUTOMATED COUNT: 12.6 % (ref 12.3–15.4)
GFR SERPL CREATININE-BSD FRML MDRD: 90 ML/MIN/1.73
GLOBULIN UR ELPH-MCNC: 2.1 GM/DL
GLUCOSE SERPL-MCNC: 95 MG/DL (ref 65–99)
HCT VFR BLD AUTO: 43.1 % (ref 34–46.6)
HGB BLD-MCNC: 14.5 G/DL (ref 12–15.9)
IMM GRANULOCYTES # BLD AUTO: 0.02 10*3/MM3 (ref 0–0.05)
IMM GRANULOCYTES NFR BLD AUTO: 0.2 % (ref 0–0.5)
LYMPHOCYTES # BLD AUTO: 2.81 10*3/MM3 (ref 0.7–3.1)
LYMPHOCYTES NFR BLD AUTO: 34.6 % (ref 19.6–45.3)
MCH RBC QN AUTO: 30.3 PG (ref 26.6–33)
MCHC RBC AUTO-ENTMCNC: 33.6 G/DL (ref 31.5–35.7)
MCV RBC AUTO: 90 FL (ref 79–97)
MONOCYTES # BLD AUTO: 0.83 10*3/MM3 (ref 0.1–0.9)
MONOCYTES NFR BLD AUTO: 10.2 % (ref 5–12)
NEUTROPHILS NFR BLD AUTO: 4.32 10*3/MM3 (ref 1.7–7)
NEUTROPHILS NFR BLD AUTO: 53.1 % (ref 42.7–76)
NRBC BLD AUTO-RTO: 0 /100 WBC (ref 0–0.2)
PLATELET # BLD AUTO: 193 10*3/MM3 (ref 140–450)
PMV BLD AUTO: 10.6 FL (ref 6–12)
POTASSIUM SERPL-SCNC: 3.7 MMOL/L (ref 3.5–5.2)
PROT SERPL-MCNC: 6.4 G/DL (ref 6–8.5)
RBC # BLD AUTO: 4.79 10*6/MM3 (ref 3.77–5.28)
SODIUM SERPL-SCNC: 133 MMOL/L (ref 136–145)
TROPONIN T SERPL-MCNC: <0.01 NG/ML (ref 0–0.03)
WBC # BLD AUTO: 8.13 10*3/MM3 (ref 3.4–10.8)

## 2021-08-03 PROCEDURE — 85025 COMPLETE CBC W/AUTO DIFF WBC: CPT | Performed by: EMERGENCY MEDICINE

## 2021-08-03 PROCEDURE — 93005 ELECTROCARDIOGRAM TRACING: CPT | Performed by: EMERGENCY MEDICINE

## 2021-08-03 PROCEDURE — 99283 EMERGENCY DEPT VISIT LOW MDM: CPT

## 2021-08-03 PROCEDURE — 80053 COMPREHEN METABOLIC PANEL: CPT | Performed by: EMERGENCY MEDICINE

## 2021-08-03 PROCEDURE — 84484 ASSAY OF TROPONIN QUANT: CPT | Performed by: EMERGENCY MEDICINE

## 2021-08-03 PROCEDURE — 93010 ELECTROCARDIOGRAM REPORT: CPT | Performed by: INTERNAL MEDICINE

## 2021-08-03 PROCEDURE — 99283 EMERGENCY DEPT VISIT LOW MDM: CPT | Performed by: EMERGENCY MEDICINE

## 2021-08-03 RX ORDER — SODIUM CHLORIDE 0.9 % (FLUSH) 0.9 %
10 SYRINGE (ML) INJECTION AS NEEDED
Status: DISCONTINUED | OUTPATIENT
Start: 2021-08-03 | End: 2021-08-04 | Stop reason: HOSPADM

## 2021-08-04 LAB — QT INTERVAL: 385 MS

## 2021-08-04 NOTE — ED PROVIDER NOTES
Subjective   History of Present Illness  History of Present Illness    Chief complaint: Low blood pressure, dizziness    Location: None    Quality/Severity: Mild dizziness    Timing/Duration: Today    Modifying Factors: None    Narrative: This patient presents for evaluation of dizziness symptoms and concern about her blood pressure being low.  She has a history of SVT and A. fib arrhythmia.  She follows with Dr. David who is her cardiologist in [place].  She takes diltiazem tablets 30 mg 3 times a day.  She expresses concern that her diltiazem tablets are causing her blood pressure to be too low and giving her weakness symptoms.  Specifically, she also expresses concern that she might have problems with her kidneys.  She did not have any fainting spells today.  She denies any difficulties breathing or chest pain symptoms either.  She tells me that during one of her vital checks today her heart rate was in the low 60s and her blood pressure was 88/66.  However, it looks like all of the other blood pressures she has recorded for today were normotensive in the 120s range systolic.  She also had an episode on Saturday where her heart rate was 40 bpm.    Associated Symptoms: As above    Review of Systems   Constitutional: Negative for activity change and fever.   HENT: Negative.    Eyes: Negative for pain and visual disturbance.   Respiratory: Negative for cough and shortness of breath.    Cardiovascular: Negative for chest pain.   Gastrointestinal: Negative for abdominal pain and vomiting.   Skin: Negative for color change.   Neurological: Positive for light-headedness. Negative for syncope.   Psychiatric/Behavioral: The patient is nervous/anxious.    All other systems reviewed and are negative.      Past Medical History:   Diagnosis Date   • SABAS (acute kidney injury) (CMS/[facility])     2019   • Arrhythmia    • Diastolic dysfunction    • Dizziness    • Hyperlipidemia    • Near syncope    • Orthostatic hypotension    •  "Osteoporosis    • PVC (premature ventricular contraction)    • Sleep apnea     wears C-Pap @ bedtime   • Stroke (CMS/HCC)        Allergies   Allergen Reactions   • Caffeine Palpitations     SVT...    • Cephalexin Palpitations   • Cyclobenzaprine Unknown - Low Severity     Wakes up \"gasping for air\"   • Naproxen Palpitations and Other (See Comments)     Patient wakes up \"gasping for air\".  Kidney injury   • Fosamax [Alendronate] Rash   • Beta Adrenergic Blockers Other (See Comments)     Breathing problems     • Diltiazem Other (See Comments)     Pt reports more dysrhythmias with this medication, but she took 40mg this AM.     • Sugar-Protein-Starch Unknown - Low Severity   • Ranitidine Other (See Comments)     Patient doesn't know if she has a reaction to this medication, she was taking this with the other medications that were causing her issues.       Past Surgical History:   Procedure Laterality Date   • CARPAL TUNNEL RELEASE         History reviewed. No pertinent family history.    Social History     Socioeconomic History   • Marital status:      Spouse name: Not on file   • Number of children: Not on file   • Years of education: Not on file   • Highest education level: Not on file   Tobacco Use   • Smoking status: Never Smoker   • Smokeless tobacco: Never Used   Substance and Sexual Activity   • Alcohol use: Never   • Drug use: Never   • Sexual activity: Defer     ED Triage Vitals [08/03/21 2135]   Temp Heart Rate Resp BP SpO2   98.2 °F (36.8 °C) 68 16 145/88 98 %      Temp src Heart Rate Source Patient Position BP Location FiO2 (%)   Oral Monitor Lying Right arm --     Objective   Physical Exam  Vitals and nursing note reviewed.   Constitutional:       General: She is not in acute distress.     Appearance: She is well-developed. She is not toxic-appearing or diaphoretic.      Comments: Pleasant lady.  No apparent distress.  A little bit anxious appearing   HENT:      Head: Normocephalic and atraumatic. "   Eyes:      General:         Right eye: No discharge.         Left eye: No discharge.      Pupils: Pupils are equal, round, and reactive to light.   Cardiovascular:      Rate and Rhythm: Normal rate and regular rhythm.      Pulses: Normal pulses.      Heart sounds: Normal heart sounds. No murmur heard.   No gallop.       Comments: Heart rate steadily in the 60s to 70 range  Pulmonary:      Effort: Pulmonary effort is normal. No respiratory distress.      Breath sounds: Normal breath sounds. No stridor. No rhonchi.   Abdominal:      Palpations: Abdomen is soft.      Tenderness: There is no abdominal tenderness.   Musculoskeletal:         General: No deformity. Normal range of motion.      Cervical back: Normal range of motion and neck supple.      Right lower leg: No edema.      Left lower leg: No edema.   Skin:     General: Skin is warm and dry.      Findings: No erythema or rash.   Neurological:      General: No focal deficit present.      Mental Status: She is alert and oriented to person, place, and time.      Motor: No weakness.   Psychiatric:         Behavior: Behavior normal.         Thought Content: Thought content normal.         Judgment: Judgment normal.       EKG           EKG time/Interp time: 2147/2147  Rhythm/Rate: Sinus rhythm, 66 bpm  P waves and [place]: P waves are present, 154 ms  QRS, axis: 83 ms, normal axis  ST and T waves: No acute ischemic changes evident    Independently interpreted by me contemporaneously with treatment    Results for orders placed or performed during the hospital encounter of 08/03/21   Comprehensive Metabolic Panel    Specimen: Blood   Result Value Ref Range    Glucose 95 65 - 99 mg/dL    BUN 18 8 - 23 mg/dL    Creatinine 0.65 0.57 - 1.00 mg/dL    Sodium 133 (L) 136 - 145 mmol/L    Potassium 3.7 3.5 - 5.2 mmol/L    Chloride 97 (L) 98 - 107 mmol/L    CO2 28.5 22.0 - 29.0 mmol/L    Calcium 9.5 8.6 - 10.5 mg/dL    Total Protein 6.4 6.0 - 8.5 g/dL    Albumin 4.30 3.50 - 5.20 g/dL     ALT (SGPT) 16 1 - 33 U/L    AST (SGOT) 13 1 - 32 U/L    Alkaline Phosphatase 44 39 - 117 U/L    Total Bilirubin 0.3 0.0 - 1.2 mg/dL    eGFR Non African Amer 90 >60 mL/min/1.73    Globulin 2.1 gm/dL    A/G Ratio 2.0 g/dL    BUN/Creatinine Ratio 27.7 (H) 7.0 - 25.0    Anion Gap 7.5 5.0 - 15.0 mmol/L   Troponin    Specimen: Blood   Result Value Ref Range    Troponin T <0.010 0.000 - 0.030 ng/mL   CBC Auto Differential    Specimen: Blood   Result Value Ref Range    WBC 8.13 3.40 - 10.80 10*3/mm3    RBC 4.79 3.77 - 5.28 10*6/mm3    Hemoglobin 14.5 12.0 - 15.9 g/dL    Hematocrit 43.1 34.0 - 46.6 %    MCV 90.0 79.0 - 97.0 fL    MCH 30.3 26.6 - 33.0 pg    MCHC 33.6 31.5 - 35.7 g/dL    RDW 12.6 12.3 - 15.4 %    RDW-SD 41.6 37.0 - 54.0 fl    MPV 10.6 6.0 - 12.0 fL    Platelets 193 140 - 450 10*3/mm3    Neutrophil % 53.1 42.7 - 76.0 %    Lymphocyte % 34.6 19.6 - 45.3 %    Monocyte % 10.2 5.0 - 12.0 %    Eosinophil % 1.4 0.3 - 6.2 %    Basophil % 0.5 0.0 - 1.5 %    Immature Grans % 0.2 0.0 - 0.5 %    Neutrophils, Absolute 4.32 1.70 - 7.00 10*3/mm3    Lymphocytes, Absolute 2.81 0.70 - 3.10 10*3/mm3    Monocytes, Absolute 0.83 0.10 - 0.90 10*3/mm3    Eosinophils, Absolute 0.11 0.00 - 0.40 10*3/mm3    Basophils, Absolute 0.04 0.00 - 0.20 10*3/mm3    Immature Grans, Absolute 0.02 0.00 - 0.05 10*3/mm3    nRBC 0.0 0.0 - 0.2 /100 WBC   ECG 12 Lead   Result Value Ref Range    QT Interval 385 ms       Procedures           ED Course  ED Course as of Aug 03 2327   Tue Aug 03, 2021   2139 I reviewed the EKG and labs from today's visit.  Everything looks quite reassuring to me at this time.  Patient was reassured that her kidney function is proper.  Her blood pressure and heart rate have been perfectly acceptable to me throughout this entire visit.  She does not have any chest pain or shortness of breath and there certainly are no worrisome features to suggest endorgan ischemia here.  Patient had some questions for me about whether or  "not she should continue her diltiazem tablets.  I told her that she should continue them as directed for now until she gets a chance to discuss this with her cardiologist tomorrow.  I advised her to call the cardiologist office early in the morning and inquire about her medications with them.  She agreed to do so.  She was discharged home in good condition then with usual \"return to ER\" instructions for worsening signs or symptoms.    [BERNARD]      ED Course User Index  [BERNARD] Lon Callejas MD                                           MDM  Number of Diagnoses or Management Options     Amount and/or Complexity of Data Reviewed  Clinical lab tests: reviewed and ordered  Independent visualization of images, tracings, or specimens: yes    Risk of Complications, Morbidity, and/or Mortality  Presenting problems: moderate  Diagnostic procedures: moderate  Management options: moderate        Final diagnoses:   Dizziness       ED Disposition  ED Disposition     ED Disposition Condition Comment    Discharge Stable           Jon David MD  [address] [place] [id number] [phone]    Call in 1 day  Call your cardiologist tomorrow to discuss your medications further         Medication List      Stop    predniSONE 10 MG tablet  Commonly known as: Lon Del Valle MD  08/03/21 2328    "

## 2021-08-04 NOTE — DISCHARGE INSTRUCTIONS
Continue taking your usual medications as directed.  Please return to the emergency room for any worsening pain, weakness, dizziness, nausea, vision changes, difficulties breathing or any other concerns.

## 2021-10-04 ENCOUNTER — TELEPHONE (OUTPATIENT)
Dept: ORTHOPEDIC SURGERY | Facility: CLINIC | Age: 71
End: 2021-10-04

## 2021-10-04 NOTE — TELEPHONE ENCOUNTER
Provider:[facility]    Caller: HANNAH KENNEDY    Relationship to Patient: SELF    Phone Number: [phone]    Reason for Call: PATIENT HAS NEVER BEEN SEEN BY THE PRACTICE AND SHE IS ASKING IF THE DOCTORS DO BLOODLESS HIP REPAIR SURGERY SINCE THAT WILL DETERMINE IF SHE WILL WANT HER DRJessica TO REFER HER THERE.

## 2021-10-16 ENCOUNTER — HOSPITAL ENCOUNTER (EMERGENCY)
Facility: HOSPITAL | Age: 71
Discharge: HOME | End: 2021-10-16
Attending: EMERGENCY MEDICINE | Admitting: EMERGENCY MEDICINE

## 2021-10-16 VITALS
SYSTOLIC BLOOD PRESSURE: 154 MMHG | HEIGHT: 63 IN | TEMPERATURE: 98 F | OXYGEN SATURATION: 97 % | DIASTOLIC BLOOD PRESSURE: 94 MMHG | BODY MASS INDEX: 31.01 KG/M2 | HEART RATE: 86 BPM | WEIGHT: 175 LBS | RESPIRATION RATE: 18 BRPM

## 2021-10-16 DIAGNOSIS — F41.8 ANXIETY ABOUT HEALTH: Primary | ICD-10-CM

## 2021-10-16 LAB
ALBUMIN SERPL-MCNC: 4.3 G/DL (ref 3.5–5.2)
ALBUMIN/GLOB SERPL: 2 G/DL
ALP SERPL-CCNC: 51 U/L (ref 39–117)
ALT SERPL W P-5'-P-CCNC: 27 U/L (ref 1–33)
ANION GAP SERPL CALCULATED.3IONS-SCNC: 10 MMOL/L (ref 5–15)
AST SERPL-CCNC: 17 U/L (ref 1–32)
BILIRUB SERPL-MCNC: 0.4 MG/DL (ref 0–1.2)
BUN SERPL-MCNC: 16 MG/DL (ref 8–23)
BUN/CREAT SERPL: 25 (ref 7–25)
CALCIUM SPEC-SCNC: 8.8 MG/DL (ref 8.6–10.5)
CHLORIDE SERPL-SCNC: 101 MMOL/L (ref 98–107)
CO2 SERPL-SCNC: 24 MMOL/L (ref 22–29)
CREAT SERPL-MCNC: 0.64 MG/DL (ref 0.57–1)
GFR SERPL CREATININE-BSD FRML MDRD: 91 ML/MIN/1.73
GLOBULIN UR ELPH-MCNC: 2.2 GM/DL
GLUCOSE SERPL-MCNC: 123 MG/DL (ref 65–99)
MAGNESIUM SERPL-MCNC: 2.1 MG/DL (ref 1.6–2.4)
POTASSIUM SERPL-SCNC: 3.7 MMOL/L (ref 3.5–5.2)
PROT SERPL-MCNC: 6.5 G/DL (ref 6–8.5)
SODIUM SERPL-SCNC: 135 MMOL/L (ref 136–145)

## 2021-10-16 PROCEDURE — 99283 EMERGENCY DEPT VISIT LOW MDM: CPT

## 2021-10-16 PROCEDURE — 80053 COMPREHEN METABOLIC PANEL: CPT | Performed by: EMERGENCY MEDICINE

## 2021-10-16 PROCEDURE — 83735 ASSAY OF MAGNESIUM: CPT | Performed by: EMERGENCY MEDICINE

## 2021-10-16 PROCEDURE — 99283 EMERGENCY DEPT VISIT LOW MDM: CPT | Performed by: EMERGENCY MEDICINE

## 2021-10-17 NOTE — ED PROVIDER NOTES
"Subjective     History provided by:  Patient    History of Present Illness    · Chief complaint: \"I think I have had too much vitamin D\"    · Location: NA    · Quality/Severity: The patient is anxious because she think she has had too much vitamin D.  She states she is dizzy and her palpitations have come back.  She states her blood pressure is bouncing up and down.    · Timing/Onset: Couple months ago.    · Modifying Factors: The patient states that she has been taking vitamin D for the last 4 months, and believes it should only have been for 2 months.    · Associated symptoms: Denies pain anywhere.    · Narrative: The patient is a 71-year-old white female who is concerned she is taken too much vitamin D.  She is prescribed by her PCP Dr. Schreiber and states she is taken it for the last 4 months.  The patient once her \"kidney function \"checked because she believes the vitamin D may have hurt her kidneys.    Review of Systems   Constitutional: Negative for chills, diaphoresis and fever.   HENT: Negative for congestion, ear pain, rhinorrhea, sore throat, tinnitus, trouble swallowing and voice change.    Eyes: Negative for pain and visual disturbance.   Respiratory: Negative for cough and shortness of breath.    Cardiovascular: Positive for palpitations.   Gastrointestinal: Negative for abdominal pain, diarrhea, nausea and vomiting.   Genitourinary: Negative for dysuria and urgency.   Musculoskeletal: Negative for back pain, neck pain and neck stiffness.   Skin: Negative for rash.   Neurological: Positive for dizziness and light-headedness. Negative for syncope, weakness, numbness and headaches.   Psychiatric/Behavioral: The patient is nervous/anxious.      Past Medical History:   Diagnosis Date   • SABAS (acute kidney injury) (CMS/[facility])     2019   • Arrhythmia    • Diastolic dysfunction    • Dizziness    • Hyperlipidemia    • Near syncope    • Orthostatic hypotension    • Osteoporosis    • PVC (premature ventricular " "contraction)    • Sleep apnea     wears C-Pap @ bedtime   • Stroke (CMS/[facility])      /94 (BP Location: Right arm, Patient Position: Lying)   Pulse 86   Temp 98 °F (36.7 °C) (Oral)   Resp 18   Ht 160 cm (63\")   Wt 79.4 kg (175 lb)   SpO2 97%   BMI 31.00 kg/m²     Past Medical History:   Diagnosis Date   • SABAS (acute kidney injury) (CMS/[facility])     2019   • Arrhythmia    • Diastolic dysfunction    • Dizziness    • Hyperlipidemia    • Near syncope    • Orthostatic hypotension    • Osteoporosis    • PVC (premature ventricular contraction)    • Sleep apnea     wears C-Pap @ bedtime   • Stroke (CMS/[facility])        Allergies   Allergen Reactions   • Caffeine Palpitations     SVT...    • Cephalexin Palpitations   • Cyclobenzaprine Unknown - Low Severity     Wakes up \"gasping for air\"   • Naproxen Palpitations and Other (See Comments)     Patient wakes up \"gasping for air\".  Kidney injury   • Fosamax [Alendronate] Rash   • Beta Adrenergic Blockers Other (See Comments)     Breathing problems     • Diltiazem Other (See Comments)     Pt reports more dysrhythmias with this medication, but she took 40mg this AM.     • Sugar-Protein-Starch Unknown - Low Severity   • Ranitidine Other (See Comments)     Patient doesn't know if she has a reaction to this medication, she was taking this with the other medications that were causing her issues.       Past Surgical History:   Procedure Laterality Date   • CARPAL TUNNEL RELEASE         No family history on file.    Social History     Socioeconomic History   • Marital status:    Tobacco Use   • Smoking status: Never Smoker   • Smokeless tobacco: Never Used   Substance and Sexual Activity   • Alcohol use: Never   • Drug use: Never   • Sexual activity: Defer           Objective   Physical Exam  Vitals and nursing note reviewed.   Constitutional:       General: She is not in acute distress.     Appearance: Normal appearance. She is well-developed. She is not ill-appearing, " toxic-appearing or diaphoretic.      Comments: The patient appears healthy in no acute distress.  Review of her vital signs: Her blood pressure slightly elevated 154/94, remainder vital signs within normal limits.   HENT:      Head: Normocephalic and atraumatic.      Nose: Nose normal. No congestion or rhinorrhea.      Mouth/Throat:      Mouth: Mucous membranes are moist.      Pharynx: Oropharynx is clear. No oropharyngeal exudate.   Eyes:      General: No scleral icterus.        Right eye: No discharge.         Left eye: No discharge.      Extraocular Movements: Extraocular movements intact.      Conjunctiva/sclera: Conjunctivae normal.      Pupils: Pupils are equal, round, and reactive to light.   Neck:      Thyroid: No thyromegaly.      Vascular: No JVD.   Cardiovascular:      Rate and Rhythm: Normal rate and regular rhythm.      Heart sounds: Normal heart sounds. No murmur heard.      Pulmonary:      Effort: Pulmonary effort is normal.      Breath sounds: Normal breath sounds. No wheezing, rhonchi or rales.   Chest:      Chest wall: No tenderness.   Abdominal:      General: Bowel sounds are normal. There is no distension.      Palpations: Abdomen is soft.      Tenderness: There is no abdominal tenderness. There is no right CVA tenderness or left CVA tenderness.   Musculoskeletal:         General: No tenderness or deformity. Normal range of motion.      Cervical back: Normal range of motion and neck supple.   Lymphadenopathy:      Cervical: No cervical adenopathy.   Skin:     General: Skin is warm and dry.      Findings: No rash.   Neurological:      Mental Status: She is alert and oriented to person, place, and time.      Cranial Nerves: No cranial nerve deficit.      Sensory: No sensory deficit.      Motor: No weakness.      Coordination: Coordination normal.      Comments: No focal motor sensory deficit   Psychiatric:         Behavior: Behavior normal.         Thought Content: Thought content normal.          Judgment: Judgment normal.      Comments: Anxious affect.         Procedures           ED Course  ED Course as of 10/17/21 0005   Sat Oct 16, 2021   2320 Review the patient's laboratory studies: Her CMP has normal renal function and normal liver function test.  Her calcium level was low normal at 8.8.  She has a slight elevation of her glucose of 123 and a slight Graham low sodium of 135.  Magnesium is normal. [TP]   2321 I discussed with the patient her laboratory results.  I encouraged her to keep her appointment with Dr. Schreiber for Monday. [TP]      ED Course User Index  [TP] Anthony Hernandez MD                                           MDM  Number of Diagnoses or Management Options  Anxiety about health: new and requires workup     Amount and/or Complexity of Data Reviewed  Clinical lab tests: ordered and reviewed    Risk of Complications, Morbidity, and/or Mortality  Presenting problems: moderate  Diagnostic procedures: moderate  Management options: moderate    Patient Progress  Patient progress: stable      Final diagnoses:   Anxiety about health       ED Disposition  ED Disposition     ED Disposition Condition Comment    Discharge Stable           Neno Schreiber MD  [address] [place] [id number]  [phone]    Go in 2 days  As scheduled         Medication List      No changes were made to your prescriptions during this visit.         Labs Reviewed   COMPREHENSIVE METABOLIC PANEL - Abnormal; Notable for the following components:       Result Value    Glucose 123 (*)     Sodium 135 (*)     All other components within normal limits    Narrative:     GFR Normal >60  Chronic Kidney Disease <60  Kidney Failure <15     MAGNESIUM - Normal     No orders to display          Medication List      No changes were made to your prescriptions during this visit.              Anthony Hernandez MD  10/17/21 0005

## 2023-01-30 ENCOUNTER — TRANSCRIBE ORDERS (OUTPATIENT)
Dept: BONE DENSITY | Facility: HOSPITAL | Age: 73
End: 2023-01-30
Payer: MEDICARE

## 2023-01-30 DIAGNOSIS — M81.8 OTHER OSTEOPOROSIS WITHOUT CURRENT PATHOLOGICAL FRACTURE: Primary | ICD-10-CM

## 2023-02-21 ENCOUNTER — TELEPHONE (OUTPATIENT)
Dept: GASTROENTEROLOGY | Facility: CLINIC | Age: 73
End: 2023-02-21

## 2023-02-21 NOTE — TELEPHONE ENCOUNTER
Hub staff attempted to follow warm transfer process and was unsuccessful     Caller: Lidia Allen    Relationship to patient: Self    Best call back number: [phone]    Patient is needing: PATIENT CALLING TO SCHEDULE APPT. FROM REFERRAL. COMMUNICATION NOTES INDICATE OFFICE IS TRYING TO SCHEDULE NOT HUB. PLEASE REVIEW AND CONTACT PATIENT TO SCHEDULE.

## 2023-03-03 ENCOUNTER — TELEPHONE (OUTPATIENT)
Dept: GASTROENTEROLOGY | Facility: CLINIC | Age: 73
End: 2023-03-03
Payer: MEDICARE

## 2023-03-03 NOTE — TELEPHONE ENCOUNTER
Called patient and let her know that we cannot give her medical advice since we have not seen her yet.   She R/S to an earlier appointment with Charo and advised to contact PCP.

## 2023-03-03 NOTE — TELEPHONE ENCOUNTER
PT called and said she was feeling really bad. Can't eat, can't swallow, throats very sore.    PT stated she went to the ER yesterday and they gave her some potassium. Made her dizzy and very sick.    PT wanted to relay the fact that she has Osteoporosis and she isn't able to swallow to take her meds.     PT wants to know what to do and to see if she can be seen prior to her scheduled appt on Friday the 10th    Please call back at [phone]

## 2023-03-08 ENCOUNTER — OFFICE VISIT (OUTPATIENT)
Dept: GASTROENTEROLOGY | Facility: CLINIC | Age: 73
End: 2023-03-08
Payer: MEDICARE

## 2023-03-08 VITALS
HEIGHT: 63 IN | BODY MASS INDEX: 30.97 KG/M2 | SYSTOLIC BLOOD PRESSURE: 138 MMHG | DIASTOLIC BLOOD PRESSURE: 78 MMHG | WEIGHT: 174.8 LBS

## 2023-03-08 DIAGNOSIS — K21.00 GASTROESOPHAGEAL REFLUX DISEASE WITH ESOPHAGITIS WITHOUT HEMORRHAGE: Primary | ICD-10-CM

## 2023-03-08 DIAGNOSIS — R93.5 ABNORMAL CT OF THE ABDOMEN: ICD-10-CM

## 2023-03-08 DIAGNOSIS — Z12.11 ENCOUNTER FOR SCREENING FOR MALIGNANT NEOPLASM OF COLON: ICD-10-CM

## 2023-03-08 PROCEDURE — 99204 OFFICE O/P NEW MOD 45 MIN: CPT

## 2023-03-08 PROCEDURE — 1160F RVW MEDS BY RX/DR IN RCRD: CPT

## 2023-03-08 PROCEDURE — 1159F MED LIST DOCD IN RCRD: CPT

## 2023-03-08 RX ORDER — FAMOTIDINE 40 MG/1
40 TABLET, FILM COATED ORAL 2 TIMES DAILY
Qty: 180 TABLET | Refills: 3 | Status: SHIPPED | OUTPATIENT
Start: 2023-03-08

## 2023-03-08 NOTE — PROGRESS NOTES
PATIENT INFORMATION  Lidia Allen       - 1950    CHIEF COMPLAINT  Chief Complaint   Patient presents with   • Difficulty Swallowing       HISTORY OF PRESENT ILLNESS    Here today for evaluation of cyst on liver    CT with 2 small cysts in liver. Reports normal LFTs, confirmed on patient health portal. Stopped eating beans because of elevated BUN/Cr ratio.     Also with irritation in esophagus, hurts epigastric through back and reports feels like spasm.    Osteoporosis treating with calcium and magnesium and worried did not drink enough water causing burning, started a new type of calcium is feel better. Can tell osteoporosis is getting worse, worried about starting a PPI because of bones. Wants to treat GERD, but wants to do it a different way other than PPI. Is avoiding citrus and trying to eat bland foods to let it heal. Prior did not feel heartburn or reflux. Feels very mild discomfort now, but getting better. No dysphagia as long as staying away from crackers and breads. No nausea, vomiting, bloating, belching. No NSAIDS, No OTC AA. Reiterates how sensitive she is to all medications. Never EGD. No recent steroids.    Several BM a day, usually easy to pass, does not have concerns with bowels.    Stroke 2017 with some dysphagia since, sometimes foods would get stuck after, but not severe.    Last colonoscopy <10 yrs ago reports normal exam. Cannot remember physician or year. Would prefer to stick with general surgeon to complete.      REVIEWED PERTINENT RESULTS/ LABS  No results found for: CASEREPORT, FINALDX  Lab Results   Component Value Date    HGB 14.5 2021    MCV 90.0 2021     2021    ALT 27 10/16/2021    AST 17 10/16/2021    INR 1.0 2021    TRIG 82 2020      No results found.    REVIEW OF SYSTEMS  Review of Systems   Constitutional: Positive for chills and fatigue.   HENT: Positive for trouble swallowing.    Eyes: Negative.    Respiratory: Positive for  shortness of breath.    Cardiovascular: Negative.    Gastrointestinal: Positive for abdominal distention. Negative for abdominal pain, constipation, diarrhea, nausea and vomiting.   Endocrine: Negative.    Genitourinary: Negative.    Musculoskeletal: Positive for back pain and neck pain.   Skin: Negative.    Allergic/Immunologic: Positive for food allergies (dairy, sugar).   Neurological: Positive for dizziness, light-headedness and numbness.   Hematological: Bruises/bleeds easily.   Psychiatric/Behavioral: Positive for confusion. The patient is nervous/anxious.          ACTIVE PROBLEMS  Patient Active Problem List    Diagnosis    • Gastroesophageal reflux disease with esophagitis without hemorrhage [K21.00]    • Palpitations [R00.2]    • Orthostatic lightheadedness [R42]          PAST MEDICAL HISTORY  Past Medical History:   Diagnosis Date   • SABAS (acute kidney injury) (HCC)     2019   • Arrhythmia    • Diastolic dysfunction    • Dizziness    • Hyperlipidemia    • Near syncope    • Orthostatic hypotension    • Osteoporosis    • PVC (premature ventricular contraction)    • Sleep apnea     wears C-Pap @ bedtime   • Stroke (HCC)          SURGICAL HISTORY  Past Surgical History:   Procedure Laterality Date   • CARPAL TUNNEL RELEASE           FAMILY HISTORY  Family History   Problem Relation Age of Onset   • Colon cancer Neg Hx    • Colon polyps Neg Hx    • Liver cancer Neg Hx    • Stomach cancer Neg Hx          SOCIAL HISTORY  Social History     Occupational History   • Not on file   Tobacco Use   • Smoking status: Never   • Smokeless tobacco: Never   Vaping Use   • Vaping Use: Never used   Substance and Sexual Activity   • Alcohol use: Never   • Drug use: Never   • Sexual activity: Defer         CURRENT MEDICATIONS    Current Outpatient Medications:   •  famotidine (PEPCID) 40 MG tablet, Take 1 tablet by mouth 2 (Two) Times a Day., Disp: 180 tablet, Rfl: 3    ALLERGIES  Caffeine, Cephalexin, Cyclobenzaprine, Naproxen,  "Fosamax [alendronate], Beta adrenergic blockers, Diltiazem, Sugar-protein-starch, Aspirin, Clarithromycin, and Ranitidine    VITALS  Vitals:    03/08/23 0801   BP: 138/78   BP Location: Left arm   Patient Position: Sitting   Cuff Size: Adult   Weight: 79.3 kg (174 lb 12.8 oz)   Height: 160 cm (62.99\")       PHYSICAL EXAM  Debilities/Disabilities Identified: None  Emotional Behavior: Appropriate  Wt Readings from Last 3 Encounters:   03/08/23 79.3 kg (174 lb 12.8 oz)   10/16/21 79.4 kg (175 lb)   08/03/21 79.4 kg (175 lb)     Ht Readings from Last 1 Encounters:   03/08/23 160 cm (62.99\")     Body mass index is 30.97 kg/m².  Physical Exam  Constitutional:       General: She is not in acute distress.     Appearance: Normal appearance. She is not ill-appearing.   HENT:      Head: Normocephalic and atraumatic.      Mouth/Throat:      Mouth: Mucous membranes are moist.      Pharynx: No posterior oropharyngeal erythema.   Eyes:      General: No scleral icterus.  Cardiovascular:      Rate and Rhythm: Normal rate and regular rhythm.      Heart sounds: Normal heart sounds.   Pulmonary:      Effort: Pulmonary effort is normal.      Breath sounds: Normal breath sounds.   Abdominal:      General: Abdomen is flat. Bowel sounds are normal. There is no distension.      Palpations: Abdomen is soft. There is no mass.      Tenderness: There is abdominal tenderness in the epigastric area and left lower quadrant. There is no guarding or rebound. Negative signs include Salas's sign.      Hernia: No hernia is present.   Musculoskeletal:      Cervical back: Neck supple.   Skin:     General: Skin is warm.      Capillary Refill: Capillary refill takes less than 2 seconds.   Neurological:      General: No focal deficit present.      Mental Status: She is alert and oriented to person, place, and time.   Psychiatric:         Mood and Affect: Mood normal.         Behavior: Behavior normal.         Thought Content: Thought content normal.        "  Judgment: Judgment normal.         CLINICAL DATA REVIEWED   reviewed previous lab results and integrated with today's visit, reviewed notes from other physicians and/or last GI encounter, reviewed previous endoscopy results and available photos, reviewed surgical pathology results from previous biopsies    ASSESSMENT  Diagnoses and all orders for this visit:    Gastroesophageal reflux disease with esophagitis without hemorrhage  -     famotidine (PEPCID) 40 MG tablet; Take 1 tablet by mouth 2 (Two) Times a Day.    Abnormal CT of the abdomen  -     US Liver; Future    Encounter for screening for malignant neoplasm of colon          PLAN    Trial BID pepcid 2 months, if no improvement will order EGD to assess for other issues or need for PPI    Return in about 2 months (around 5/8/2023).    I have discussed the above plan with the patient.  They verbalize understanding and are in agreement with the plan.  They have been advised to contact the office for any questions, concerns, or changes related to their health.

## 2023-04-11 ENCOUNTER — OFFICE VISIT (OUTPATIENT)
Dept: OBSTETRICS AND GYNECOLOGY | Facility: CLINIC | Age: 73
End: 2023-04-11
Payer: MEDICARE

## 2023-04-11 VITALS
WEIGHT: 177.2 LBS | HEIGHT: 63 IN | BODY MASS INDEX: 31.4 KG/M2 | DIASTOLIC BLOOD PRESSURE: 92 MMHG | SYSTOLIC BLOOD PRESSURE: 138 MMHG

## 2023-04-11 DIAGNOSIS — Z01.419 WELL WOMAN EXAM WITH ROUTINE GYNECOLOGICAL EXAM: Primary | ICD-10-CM

## 2023-04-11 DIAGNOSIS — R10.2 PELVIC PAIN: ICD-10-CM

## 2023-04-11 DIAGNOSIS — Z12.31 ENCOUNTER FOR SCREENING MAMMOGRAM FOR MALIGNANT NEOPLASM OF BREAST: ICD-10-CM

## 2023-04-11 DIAGNOSIS — Z01.419 PAP SMEAR, LOW-RISK: ICD-10-CM

## 2023-04-11 DIAGNOSIS — Z11.51 ENCOUNTER FOR SCREENING FOR HUMAN PAPILLOMAVIRUS (HPV): ICD-10-CM

## 2023-04-11 LAB
BILIRUB BLD-MCNC: NEGATIVE MG/DL
CLARITY, POC: CLEAR
COLOR UR: YELLOW
GLUCOSE UR STRIP-MCNC: NEGATIVE MG/DL
KETONES UR QL: NEGATIVE
LEUKOCYTE EST, POC: NEGATIVE
NITRITE UR-MCNC: NEGATIVE MG/ML
PH UR: 5 [PH] (ref 5–8)
PROT UR STRIP-MCNC: NEGATIVE MG/DL
RBC # UR STRIP: NEGATIVE /UL
SP GR UR: 1 (ref 1–1.03)
UROBILINOGEN UR QL: NORMAL

## 2023-04-11 NOTE — PROGRESS NOTES
"GYN Annual Exam     Chief Complaint   Patient presents with   • Pelvic Pain       Lidia Allen is a 72 y.o. female who presents for annual well woman exam. Periods absent since age 45. She denies vaginal spotting or discharge. She does not do SBE monthly.  She is not sexually active.     She presents with c/o achy pain in her pelvis. The pain occurs randomly. She pin points an area on her (R) lower pelvis and states, \"something is abnormal.\"  She is rubbing her lower pelvis and reports something is not right. She had a CT Scan  showing mild asymmetric left periuterine, left para adnexal and left gonadal venous prominence, most likely related to pelvic congestion syndrome. She was ref here by her PCP to disc.     OB History        2    Para   2    Term   2            AB        Living   2       SAB        IAB        Ectopic        Molar        Multiple        Live Births              Obstetric Comments    X2              Mammogram:  pt reports normal   Dexa scan:  Osteoporosis   Colonoscopy:  Uncertain but reports UTD  Last Pap : \"Long time ago.\"   History of abnormal Pap smear: no  Family history of uterine, colon or ovarian cancer: no  Family history of breast cancer: no  History of abnormal mammogram: no        Past Medical History:   Diagnosis Date   • SABAS (acute kidney injury)     2019   • Arrhythmia    • Diastolic dysfunction    • Dizziness    • Hyperlipidemia    • Near syncope    • Orthostatic hypotension    • Osteoporosis    • PVC (premature ventricular contraction)    • Sleep apnea     wears C-Pap @ bedtime   • Stroke        Past Surgical History:   Procedure Laterality Date   • CARPAL TUNNEL RELEASE           Current Outpatient Medications:   •  famotidine (PEPCID) 40 MG tablet, Take 1 tablet by mouth 2 (Two) Times a Day., Disp: 180 tablet, Rfl: 3    Allergies   Allergen Reactions   • Caffeine Palpitations     SVT...    • Cephalexin Palpitations   • Cyclobenzaprine Unknown - " "Low Severity     Wakes up \"gasping for air\"   • Naproxen Palpitations and Other (See Comments)     Patient wakes up \"gasping for air\".  Kidney injury   • Fosamax [Alendronate] Rash   • Beta Adrenergic Blockers Other (See Comments)     Breathing problems     • Diltiazem Other (See Comments)     Pt reports more dysrhythmias with this medication, but she took 40mg this AM.     • Sugar-Protein-Starch Unknown - Low Severity   • Wheat Extract GI Intolerance   • Aspirin Rash   • Clarithromycin Palpitations   • Levofloxacin Other (See Comments) and Rash   • Ranitidine Other (See Comments)     Patient doesn't know if she has a reaction to this medication, she was taking this with the other medications that were causing her issues.       Social History     Tobacco Use   • Smoking status: Never   • Smokeless tobacco: Never   Vaping Use   • Vaping Use: Never used   Substance Use Topics   • Alcohol use: Never   • Drug use: Never       Family History   Problem Relation Age of Onset   • Alzheimer's disease Father    • Supraventricular tachycardia Father    • Diabetes Mother    • Arthritis Mother    • Colon cancer Neg Hx    • Colon polyps Neg Hx    • Liver cancer Neg Hx    • Stomach cancer Neg Hx    • Breast cancer Neg Hx    • Ovarian cancer Neg Hx    • Uterine cancer Neg Hx        Review of Systems   Constitutional: Negative.    Respiratory: Negative.    Cardiovascular: Negative.    Gastrointestinal: Negative.    Endocrine: Negative.    Genitourinary: Positive for frequency and pelvic pain. Negative for vaginal bleeding.   Musculoskeletal: Negative.    Skin: Negative.    Neurological: Negative.    Psychiatric/Behavioral: Negative.        /92   Ht 160 cm (62.99\")   Wt 80.4 kg (177 lb 3.2 oz)   BMI 31.40 kg/m²     Physical Exam  Vitals reviewed.   Constitutional:       Appearance: She is well-developed.   Neck:      Thyroid: No thyromegaly.   Cardiovascular:      Rate and Rhythm: Normal rate and regular rhythm.      Heart " sounds: Normal heart sounds.   Pulmonary:      Effort: Pulmonary effort is normal.      Breath sounds: Normal breath sounds.   Chest:   Breasts:     Right: No inverted nipple, mass, nipple discharge, skin change or tenderness.      Left: No inverted nipple, mass, nipple discharge, skin change or tenderness.   Abdominal:      General: Bowel sounds are normal.      Palpations: Abdomen is soft.   Genitourinary:     Exam position: Supine.      Labia:         Right: No rash, tenderness, lesion or injury.         Left: No rash, tenderness, lesion or injury.       Vagina: No signs of injury and foreign body. No vaginal discharge, erythema, tenderness or bleeding.      Cervix: No cervical motion tenderness, discharge or friability.      Uterus: Not deviated, not enlarged, not fixed and not tender.       Adnexa:         Right: No mass, tenderness or fullness.          Left: No mass, tenderness or fullness.        Rectum: Normal.   Musculoskeletal:         General: Normal range of motion.      Cervical back: Normal range of motion and neck supple.   Skin:     General: Skin is warm and dry.   Neurological:      General: No focal deficit present.      Mental Status: She is alert and oriented to person, place, and time.   Psychiatric:         Mood and Affect: Mood normal.         Behavior: Behavior normal.            Assessment     1) GYN annual well woman exam.   2) Postmenopausal   3) Osteoporosis   4) Frequent urination   5) Pelvic congestion syndrome      Plan     1) Breast Health - Clinical breast exam & mammogram yearly, Self breast awareness. Schedule screening mammogram.   2) Pap - Collected today   3) Osteoporosis- Dexa 2020 showed osteoporosis. Taking Calcium/Vit D. Has repeat Dexa next month per PCP.   4 Smoking status- non smoker   5) Colon health - screening colonoscopy recommended if not up to date  6) Body mass index is 31.4 kg/m².  7) Bone health - Weight bearing exercise, dietary calcium recommendations and  vitamin D  reviewed.   8) Frequent urination- Check urine cx. Most likely d/t her water intake. She is taking sodium and extra water to help with syncope.   9) Pelvic congestion syndrome- Noted on CT scan 1/23. Disc case with Dr. Ko, report nothing further to be done at this time. Info provided on pelvic congestion syndrome. If pelvic pain continues disc option of hysterectomy.     RTO PRN     Vonda Gilmore, APRN  4/11/2023  15:35 EDT

## 2023-04-16 LAB
CYTOLOGIST CVX/VAG CYTO: NORMAL
CYTOLOGY CVX/VAG DOC CYTO: NORMAL
CYTOLOGY CVX/VAG DOC THIN PREP: NORMAL
DX ICD CODE: NORMAL
HIV 1 & 2 AB SER-IMP: NORMAL
HPV I/H RISK 4 DNA CVX QL PROBE+SIG AMP: NEGATIVE
OTHER STN SPEC: NORMAL
STAT OF ADQ CVX/VAG CYTO-IMP: NORMAL

## 2023-04-24 PROBLEM — Z01.419 WELL WOMAN EXAM WITH ROUTINE GYNECOLOGICAL EXAM: Status: ACTIVE | Noted: 2023-04-24

## 2023-05-08 ENCOUNTER — TRANSCRIBE ORDERS (OUTPATIENT)
Dept: ADMINISTRATIVE | Facility: HOSPITAL | Age: 73
End: 2023-05-08
Payer: MEDICARE

## 2023-05-08 DIAGNOSIS — G47.33 OBSTRUCTIVE SLEEP APNEA (ADULT) (PEDIATRIC): ICD-10-CM

## 2023-05-08 DIAGNOSIS — Z95.818 OTHER SPECIFIED CARDIAC DEVICE IN SITU: ICD-10-CM

## 2023-05-08 DIAGNOSIS — I49.1 SUPRAVENTRICULAR PREMATURE BEATS: ICD-10-CM

## 2023-05-08 DIAGNOSIS — I69.30 SEQUELAE OF CEREBRAL INFARCTION: Primary | ICD-10-CM

## 2023-05-09 ENCOUNTER — OFFICE VISIT (OUTPATIENT)
Dept: GASTROENTEROLOGY | Facility: CLINIC | Age: 73
End: 2023-05-09
Payer: MEDICARE

## 2023-05-09 VITALS
DIASTOLIC BLOOD PRESSURE: 72 MMHG | WEIGHT: 177 LBS | BODY MASS INDEX: 31.36 KG/M2 | SYSTOLIC BLOOD PRESSURE: 122 MMHG | HEIGHT: 63 IN

## 2023-05-09 DIAGNOSIS — K21.00 GASTROESOPHAGEAL REFLUX DISEASE WITH ESOPHAGITIS WITHOUT HEMORRHAGE: Primary | ICD-10-CM

## 2023-05-09 NOTE — PROGRESS NOTES
PATIENT INFORMATION  Lidia Allen       - 1950    CHIEF COMPLAINT  Chief Complaint   Patient presents with   • Heartburn       HISTORY OF PRESENT ILLNESS  Here today for GERD follow-up    CT abd 2023 with small liver cysts, US scheduled in August to demonstrate stability.    2 months ago was complaining of epigastric pain and spasms, wanted to treat GERD, but not with PPI for osteoporosis concerns. Plan was trial BID PPI, reports severe muscles cramps and has since stopped. Spasms have improved with dietary changes. Reviewed strong recommendation of screening EGD especially in absence of treating reflux. Refusing right now, and patient will discuss with her neurologist and call back.    Really concerned with osteoporosis, states cannot tolerate calcium, will not treat osteoporosis.     Stroke 2017 with some dysphagia since, sometimes foods would get stuck after, but not severe.     Last colonoscopy <10 yrs ago reports normal exam. Cannot remember physician or year. Would prefer to stick with general surgeon to complete.       REVIEWED PERTINENT RESULTS/ LABS  No results found for: CASEREPORT, FINALDX  Lab Results   Component Value Date    HGB 14.5 2021    MCV 90.0 2021     2021    ALT 27 10/16/2021    AST 17 10/16/2021    INR 1.0 2021    TRIG 82 2020      No results found.    REVIEW OF SYSTEMS  Review of Systems   Constitutional: Positive for fatigue.   HENT: Positive for trouble swallowing.    Eyes: Negative.    Respiratory: Negative.    Cardiovascular: Negative.    Gastrointestinal: Negative for abdominal pain, constipation, diarrhea, nausea and vomiting.        GERD   Endocrine: Negative.    Genitourinary: Negative.    Musculoskeletal: Positive for arthralgias.   Skin: Negative.    Allergic/Immunologic: Positive for food allergies (dairyand wheat ).   Neurological: Positive for dizziness and headaches.   Hematological: Negative.    Psychiatric/Behavioral:  Positive for confusion. The patient is nervous/anxious.          ACTIVE PROBLEMS  Patient Active Problem List    Diagnosis    • Well woman exam with routine gynecological exam [Z01.419]    • Gastroesophageal reflux disease with esophagitis without hemorrhage [K21.00]    • Palpitations [R00.2]    • Orthostatic lightheadedness [R42]          PAST MEDICAL HISTORY  Past Medical History:   Diagnosis Date   • SABAS (acute kidney injury)     2019   • Arrhythmia    • Diastolic dysfunction    • Dizziness    • Hyperlipidemia    • Near syncope    • Orthostatic hypotension    • Osteoporosis    • PVC (premature ventricular contraction)    • Sleep apnea     wears C-Pap @ bedtime   • Stroke          SURGICAL HISTORY  Past Surgical History:   Procedure Laterality Date   • CARPAL TUNNEL RELEASE           FAMILY HISTORY  Family History   Problem Relation Age of Onset   • Alzheimer's disease Father    • Supraventricular tachycardia Father    • Diabetes Mother    • Arthritis Mother    • Colon cancer Neg Hx    • Colon polyps Neg Hx    • Liver cancer Neg Hx    • Stomach cancer Neg Hx    • Breast cancer Neg Hx    • Ovarian cancer Neg Hx    • Uterine cancer Neg Hx          SOCIAL HISTORY  Social History     Occupational History   • Not on file   Tobacco Use   • Smoking status: Never   • Smokeless tobacco: Never   Vaping Use   • Vaping Use: Never used   Substance and Sexual Activity   • Alcohol use: Never   • Drug use: Never   • Sexual activity: Defer         CURRENT MEDICATIONS    Current Outpatient Medications:   •  famotidine (PEPCID) 40 MG tablet, Take 1 tablet by mouth 2 (Two) Times a Day. (Patient not taking: Reported on 5/9/2023), Disp: 180 tablet, Rfl: 3    ALLERGIES  Caffeine, Cephalexin, Cyclobenzaprine, Naproxen, Fosamax [alendronate], Beta adrenergic blockers, Diltiazem, Sugar-protein-starch, Wheat extract, Aspirin, Clarithromycin, Levofloxacin, and Ranitidine    VITALS  Vitals:    05/09/23 1504   BP: 122/72   BP Location: Left  "arm   Patient Position: Sitting   Cuff Size: Adult   Weight: 80.3 kg (177 lb)   Height: 160 cm (62.99\")       PHYSICAL EXAM  Debilities/Disabilities Identified: None  Emotional Behavior: Appropriate  Wt Readings from Last 3 Encounters:   05/09/23 80.3 kg (177 lb)   04/11/23 80.4 kg (177 lb 3.2 oz)   03/08/23 79.3 kg (174 lb 12.8 oz)     Ht Readings from Last 1 Encounters:   05/09/23 160 cm (62.99\")     Body mass index is 31.36 kg/m².  Physical Exam  Constitutional:       General: She is not in acute distress.     Appearance: Normal appearance. She is not ill-appearing.   HENT:      Head: Normocephalic and atraumatic.      Mouth/Throat:      Mouth: Mucous membranes are moist.      Pharynx: No posterior oropharyngeal erythema.   Eyes:      General: No scleral icterus.  Cardiovascular:      Rate and Rhythm: Normal rate and regular rhythm.      Heart sounds: Normal heart sounds.   Pulmonary:      Effort: Pulmonary effort is normal.      Breath sounds: Normal breath sounds.   Abdominal:      General: Abdomen is flat. Bowel sounds are normal. There is no distension.      Palpations: Abdomen is soft. There is no mass.      Tenderness: There is no abdominal tenderness. There is no guarding or rebound. Negative signs include Salas's sign.      Hernia: No hernia is present.   Musculoskeletal:      Cervical back: Neck supple.   Skin:     General: Skin is warm.      Capillary Refill: Capillary refill takes less than 2 seconds.   Neurological:      General: No focal deficit present.      Mental Status: She is alert and oriented to person, place, and time.   Psychiatric:         Mood and Affect: Mood normal.         Behavior: Behavior normal.         Thought Content: Thought content normal.         Judgment: Judgment normal.         CLINICAL DATA REVIEWED   reviewed previous lab results and integrated with today's visit, reviewed notes from other physicians and/or last GI encounter, reviewed previous endoscopy results and " available photos, reviewed surgical pathology results from previous biopsies    ASSESSMENT  Diagnoses and all orders for this visit:    Gastroesophageal reflux disease with esophagitis without hemorrhage          PLAN    Strongly recommend EGD   Will call to follow-up if decides to screen and treat    Return if symptoms worsen or fail to improve.    I have discussed the above plan with the patient.  They verbalize understanding and are in agreement with the plan.  They have been advised to contact the office for any questions, concerns, or changes related to their health.

## 2023-06-13 ENCOUNTER — APPOINTMENT (OUTPATIENT)
Dept: MRI IMAGING | Facility: HOSPITAL | Age: 73
End: 2023-06-13
Payer: MEDICARE

## 2023-06-13 ENCOUNTER — HOSPITAL ENCOUNTER (OUTPATIENT)
Dept: ULTRASOUND IMAGING | Facility: HOSPITAL | Age: 73
Discharge: HOME | End: 2023-06-13
Admitting: PSYCHIATRY & NEUROLOGY
Payer: MEDICARE

## 2023-06-13 DIAGNOSIS — I69.30 SEQUELAE OF CEREBRAL INFARCTION: ICD-10-CM

## 2023-06-13 DIAGNOSIS — I49.1 SUPRAVENTRICULAR PREMATURE BEATS: ICD-10-CM

## 2023-06-13 DIAGNOSIS — Z95.818 OTHER SPECIFIED CARDIAC DEVICE IN SITU: ICD-10-CM

## 2023-06-13 DIAGNOSIS — G47.33 OBSTRUCTIVE SLEEP APNEA (ADULT) (PEDIATRIC): ICD-10-CM

## 2023-06-13 PROCEDURE — 93880 EXTRACRANIAL BILAT STUDY: CPT

## 2023-09-05 ENCOUNTER — HOSPITAL ENCOUNTER (OUTPATIENT)
Dept: ULTRASOUND IMAGING | Facility: HOSPITAL | Age: 73
Discharge: HOME | End: 2023-09-05
Payer: MEDICARE

## 2023-09-05 DIAGNOSIS — R93.5 ABNORMAL CT OF THE ABDOMEN: ICD-10-CM

## 2023-09-05 PROCEDURE — 76705 ECHO EXAM OF ABDOMEN: CPT

## 2023-12-06 ENCOUNTER — LAB (OUTPATIENT)
Dept: LAB | Facility: HOSPITAL | Age: 73
End: 2023-12-06
Payer: MEDICARE

## 2023-12-06 ENCOUNTER — OFFICE VISIT (OUTPATIENT)
Dept: CARDIOLOGY | Facility: CLINIC | Age: 73
End: 2023-12-06
Payer: MEDICARE

## 2023-12-06 VITALS
HEIGHT: 63 IN | SYSTOLIC BLOOD PRESSURE: 130 MMHG | DIASTOLIC BLOOD PRESSURE: 82 MMHG | HEART RATE: 72 BPM | WEIGHT: 173.1 LBS | BODY MASS INDEX: 30.67 KG/M2

## 2023-12-06 DIAGNOSIS — I49.1 PREMATURE ATRIAL COMPLEXES: ICD-10-CM

## 2023-12-06 DIAGNOSIS — R00.2 PALPITATIONS: ICD-10-CM

## 2023-12-06 DIAGNOSIS — R06.09 DYSPNEA ON EXERTION: Primary | ICD-10-CM

## 2023-12-06 DIAGNOSIS — R06.09 DYSPNEA ON EXERTION: ICD-10-CM

## 2023-12-06 LAB
ALBUMIN SERPL-MCNC: 4.6 G/DL (ref 3.5–5.2)
ALBUMIN/GLOB SERPL: 2 G/DL
ALP SERPL-CCNC: 62 U/L (ref 39–117)
ALT SERPL W P-5'-P-CCNC: 20 U/L (ref 1–33)
ANION GAP SERPL CALCULATED.3IONS-SCNC: 11 MMOL/L (ref 5–15)
AST SERPL-CCNC: 19 U/L (ref 1–32)
BILIRUB SERPL-MCNC: 0.2 MG/DL (ref 0–1.2)
BUN SERPL-MCNC: 17 MG/DL (ref 8–23)
BUN/CREAT SERPL: 28.3 (ref 7–25)
CALCIUM SPEC-SCNC: 9.3 MG/DL (ref 8.6–10.5)
CHLORIDE SERPL-SCNC: 102 MMOL/L (ref 98–107)
CO2 SERPL-SCNC: 24 MMOL/L (ref 22–29)
CREAT SERPL-MCNC: 0.6 MG/DL (ref 0.57–1)
EGFRCR SERPLBLD CKD-EPI 2021: 94.9 ML/MIN/1.73
GLOBULIN UR ELPH-MCNC: 2.3 GM/DL
GLUCOSE SERPL-MCNC: 98 MG/DL (ref 65–99)
NT-PROBNP SERPL-MCNC: 50 PG/ML (ref 0–900)
POTASSIUM SERPL-SCNC: 4.1 MMOL/L (ref 3.5–5.2)
PROT SERPL-MCNC: 6.9 G/DL (ref 6–8.5)
SODIUM SERPL-SCNC: 137 MMOL/L (ref 136–145)

## 2023-12-06 PROCEDURE — 36415 COLL VENOUS BLD VENIPUNCTURE: CPT

## 2023-12-06 PROCEDURE — 1160F RVW MEDS BY RX/DR IN RCRD: CPT | Performed by: STUDENT IN AN ORGANIZED HEALTH CARE EDUCATION/TRAINING PROGRAM

## 2023-12-06 PROCEDURE — 1159F MED LIST DOCD IN RCRD: CPT | Performed by: STUDENT IN AN ORGANIZED HEALTH CARE EDUCATION/TRAINING PROGRAM

## 2023-12-06 PROCEDURE — 93000 ELECTROCARDIOGRAM COMPLETE: CPT | Performed by: STUDENT IN AN ORGANIZED HEALTH CARE EDUCATION/TRAINING PROGRAM

## 2023-12-06 PROCEDURE — 80053 COMPREHEN METABOLIC PANEL: CPT

## 2023-12-06 PROCEDURE — 83880 ASSAY OF NATRIURETIC PEPTIDE: CPT

## 2023-12-06 PROCEDURE — 99204 OFFICE O/P NEW MOD 45 MIN: CPT | Performed by: STUDENT IN AN ORGANIZED HEALTH CARE EDUCATION/TRAINING PROGRAM

## 2023-12-06 RX ORDER — MAGNESIUM CHLORIDE 200 MG/ML
200 INJECTION INTRAVENOUS EVERY 24 HOURS
COMMUNITY

## 2023-12-06 RX ORDER — ERGOCALCIFEROL 1.25 MG/1
50000 CAPSULE ORAL
COMMUNITY

## 2023-12-06 RX ORDER — ASCORBIC ACID 250 MG
250 TABLET ORAL DAILY
COMMUNITY

## 2023-12-06 NOTE — PROGRESS NOTES
"      [facility]    Subjective:     Encounter Date:12/06/23      Patient ID: Lidia Allen is a 73 y.o. female.    Chief Complaint: No chief complaint on file.  Dyspnea, palpitations  History of Present Illness    Ms. Allen is a 73-year-old lady past medical history palpitations, GERD, who presents for further evaluation palpitations.  She previously followed with Dr. David at [place].  She is transferring her care to [facility].    She has a history of palpitations and a prior CVA.  Given these she had a loop recorder placed.  She previously was on atenolol but did not tolerate due to low heart rate and fatigue.  She was not interested in trying other beta-blockers.  Diltiazem as needed was discussed but she did not feel this was needed.    Most recent ILR interrogation on August 18 did not reveal any arrhythmias.  Historically, the ILR has noted some premature atrial contractions and nonsustained atrial tachycardia but otherwise no sustained arrhythmias    She presents today with worsening symptoms.  She has been having nonspecific nausea and bloating complaints, and also has some intermittent swelling concerns and hopes that her \"heart is not getting worse.\"  She reports that she was having malfunction of her CPAP that she thinks may be trigger the symptoms and is worried that her heart was damaged and she presents today for further evaluation.  Show eyes denies any chest pain her main complaint is dyspnea and the intermittent swelling episodes.  The palpitations seem to occur constantly and she just feels that \"something is wrong.\"  She request further evaluation.    I reviewed her previous cardiac testing from [place].:  She underwent an echocardiogram July 29, 2022 which revealed the following:  Normal LVEF, normal RVSP, mild mitral regurgitation,, otherwise unremarkable    Cardiac catheterization April 18, 2019:  Normal coronary arteries, no evidence of any atherosclerosis    The " "following portions of the patient's history were reviewed and updated as appropriate: allergies, current medications, past family history, past medical history, past social history, past surgical history and problem list.    Past Medical History:   Diagnosis Date    SABAS (acute kidney injury)     2019    Arrhythmia     Diastolic dysfunction     Dizziness     Hyperlipidemia     Near syncope     Orthostatic hypotension     Osteoporosis     PVC (premature ventricular contraction)     Sleep apnea     wears C-Pap @ bedtime    Stroke        Past Surgical History:   Procedure Laterality Date    CARPAL TUNNEL RELEASE             ECG 12 Lead    Date/Time: 12/6/2023 10:57 AM  Performed by: Candelario Parisi MD    Authorized by: Candelario Parisi MD  Comparison: compared with previous ECG from 8/3/2021  Similar to previous ECG  Rhythm: sinus rhythm  Rate: normal  Conduction: conduction normal  ST Segments: ST segments normal  T Waves: T waves normal  QRS axis: normal  Other: no other findings    Clinical impression: normal ECG             Objective:     Vitals:    12/06/23 1025   BP: 130/82   BP Location: Left arm   Pulse: 72   Weight: 78.5 kg (173 lb 1.6 oz)   Height: 160 cm (63\")         Constitutional:       Appearance: Healthy appearance. Not in distress.   Neck:      Vascular: JVD normal.   Pulmonary:      Effort: Pulmonary effort is normal.      Breath sounds: Normal breath sounds.   Cardiovascular:      PMI at left midclavicular line. Normal rate. Regular rhythm. Normal S2.       Murmurs: There is no murmur.   Pulses:     Intact distal pulses.   Edema:     Peripheral edema absent.   Skin:     General: Skin is warm and dry.   Neurological:      General: No focal deficit present.      Mental Status: Alert, oriented to person, place, and time and oriented to person, place and time.   Psychiatric:         Mood and Affect: Mood and affect normal.         Lab Review:       BUN   Date Value Ref Range Status   10/16/2021 16 8 - 23 " mg/dL Final   02/22/2021 15 7 - 20 mg/dL Final     Creatinine   Date Value Ref Range Status   02/08/2022 0.70 0.6 - 1.3 mg/dL Final     Potassium   Date Value Ref Range Status   10/16/2021 3.7 3.5 - 5.2 mmol/L Final   02/22/2021 3.7 3.5 - 5.1 mmol/L Final     ALT (SGPT)   Date Value Ref Range Status   10/16/2021 27 1 - 33 U/L Final   02/22/2021 25 0 - 35 U/L Final     AST (SGOT)   Date Value Ref Range Status   10/16/2021 17 1 - 32 U/L Final   02/22/2021 27 15 - 46 U/L Final         Performed        Assessment:          Diagnosis Plan   1. Dyspnea on exertion  ECG 12 Lead    Adult Transthoracic Echo Complete w/ Color, Spectral and Contrast if necessary per protocol    Comprehensive Metabolic Panel    BNP      2. Premature atrial complexes  Adult Transthoracic Echo Complete w/ Color, Spectral and Contrast if necessary per protocol    Comprehensive Metabolic Panel    BNP      3. Palpitations  Adult Transthoracic Echo Complete w/ Color, Spectral and Contrast if necessary per protocol    Comprehensive Metabolic Panel    BNP             Plan:         Palpitations: Consistent with PACs, but there may be a noncardiac contribution as well.  She wore a 14-day Zio patch in January 2023 given her ongoing complaints and the fact that the loop recorder was not picking up any significant episodes.  Did reveal short, nonsustained atrial tachycardia episodes lasting at most 14 seconds, but these were infrequent.  PAC burden less than 1%.  Patient was reassured.  Her ILR did not show any sustained arrhythmias and has not done so.  We will arrange for establishment with our device clinic, her ILR is probably near ER L, I do not think it needs to be replaced unless that shows any significant arrhythmias  Patient largely reassured, her palpitations do not require any treatment and may ultimately be noncardiac in origin  Dyspnea on exertion: Intermittent, atypical episodes.  She reports they worsened after she had a CPAP malfunction  "recently.  She worries her \"heart was damaged.\"  Sometimes is accompanied with leg weakness.  Work-up is ongoing by PCP.  We will ensure there is no cardiac contribution with echocardiogram  If her echo is normal, patient reassured her symptoms are noncardiac in origin.  We will also check a BNP and electrolyte panel today.  Her dyspnea symptoms are not related to ischemia.  Her coronary angiography in 2019 showed normal coronary arteries..  Do not feel additional ischemic testing is needed at this time.  She reportedly had a false positive stress test in the past but I do not see records of this in the [facility] system.  Coronary angiography results reviewed per above    Thank you for allowing me to participate in the care of Lidia Allen. Feel free to contact me directly with any further questions or concerns.    RTC 6 months for symptom check-in.  Will refer to device clinic to ensure there is no other new arrhythmias that would explain her symptoms, and will read the echocardiogram.  If her blood work, echo and interrogation did not show any new findings, patient reassured her symptoms are noncardiac in origin.    Candelario Parisi MD  [facility]  12/06/23  10:20 EST       Current Outpatient Medications:     ascorbic acid (VITAMIN C) 250 MG tablet, Take 1 tablet by mouth Daily., Disp: , Rfl:     CALCIUM PO, Take 720 mg by mouth Daily., Disp: , Rfl:     Magnesium Chloride 200 MG/ML solution, Inject 200 mg under the skin into the appropriate area as directed Daily., Disp: , Rfl:     vitamin D (ERGOCALCIFEROL) 1.25 MG (65187 UT) capsule capsule, Take 1 capsule by mouth Every 7 (Seven) Days., Disp: , Rfl:          Return in about 6 months (around 6/6/2024).      Part of this note may be an electronic transcription/translation of spoken language to printed text using the Dragon Dictation System.  "

## 2023-12-07 ENCOUNTER — TELEPHONE (OUTPATIENT)
Dept: CARDIOLOGY | Facility: CLINIC | Age: 73
End: 2023-12-07

## 2023-12-07 NOTE — TELEPHONE ENCOUNTER
----- Message from Candelario Parisi MD sent at 12/6/2023  9:39 PM EST -----  Can you please call patient let her know that her blood work looks excellent.  Her electrolytes are normal, there is no findings of any electrolyte abnormalities or liver issues.  Heart failure marker, called BNP, is completely normal.  If there is signs of heart failure that would be causing fluid retention, this would generally be elevated, and hers is completely normal, almost low in fact.  This is very reassuring overall.  Thank you for the help

## 2023-12-07 NOTE — TELEPHONE ENCOUNTER
Notified patient of results, patient verbalizes understanding.    [facility] T[facility]  12/07/23 08:35 EST

## 2023-12-19 ENCOUNTER — HOSPITAL ENCOUNTER (OUTPATIENT)
Dept: GENERAL RADIOLOGY | Facility: HOSPITAL | Age: 73
Discharge: HOME | End: 2023-12-19
Payer: MEDICARE

## 2023-12-19 ENCOUNTER — HOSPITAL ENCOUNTER (OUTPATIENT)
Dept: CARDIOLOGY | Facility: HOSPITAL | Age: 73
Discharge: HOME | End: 2023-12-19
Payer: MEDICARE

## 2023-12-19 VITALS
DIASTOLIC BLOOD PRESSURE: 78 MMHG | HEIGHT: 63 IN | HEART RATE: 83 BPM | SYSTOLIC BLOOD PRESSURE: 149 MMHG | WEIGHT: 173 LBS | BODY MASS INDEX: 30.65 KG/M2

## 2023-12-19 DIAGNOSIS — M79.675 PAIN IN TOE OF LEFT FOOT: ICD-10-CM

## 2023-12-19 DIAGNOSIS — I49.1 PREMATURE ATRIAL COMPLEXES: ICD-10-CM

## 2023-12-19 DIAGNOSIS — R00.2 PALPITATIONS: ICD-10-CM

## 2023-12-19 DIAGNOSIS — R06.09 DYSPNEA ON EXERTION: ICD-10-CM

## 2023-12-19 LAB
AORTIC ARCH: 2.2 CM
AORTIC DIMENSIONLESS INDEX: 0.8 (DI)
ASCENDING AORTA: 3.3 CM
BH CV ECHO MEAS - ACS: 1.83 CM
BH CV ECHO MEAS - AO MAX PG: 6.6 MMHG
BH CV ECHO MEAS - AO MEAN PG: 4.3 MMHG
BH CV ECHO MEAS - AO ROOT DIAM: 3.1 CM
BH CV ECHO MEAS - AO V2 MAX: 128.3 CM/SEC
BH CV ECHO MEAS - AO V2 VTI: 26.5 CM
BH CV ECHO MEAS - AVA(I,D): 2.35 CM2
BH CV ECHO MEAS - EDV(CUBED): 92.5 ML
BH CV ECHO MEAS - EDV(MOD-SP4): 39 ML
BH CV ECHO MEAS - EF(MOD-BP): 61 %
BH CV ECHO MEAS - EF(MOD-SP4): 61.5 %
BH CV ECHO MEAS - ESV(CUBED): 28.2 ML
BH CV ECHO MEAS - ESV(MOD-SP4): 15 ML
BH CV ECHO MEAS - FS: 32.7 %
BH CV ECHO MEAS - IVS/LVPW: 1.17 CM
BH CV ECHO MEAS - IVSD: 1.22 CM
BH CV ECHO MEAS - LA DIMENSION: 2.7 CM
BH CV ECHO MEAS - LAT PEAK E' VEL: 11.7 CM/SEC
BH CV ECHO MEAS - LV DIASTOLIC VOL/BSA (35-75): 21.4 CM2
BH CV ECHO MEAS - LV MASS(C)D: 183.3 GRAMS
BH CV ECHO MEAS - LV MAX PG: 4.4 MMHG
BH CV ECHO MEAS - LV MEAN PG: 2.4 MMHG
BH CV ECHO MEAS - LV SYSTOLIC VOL/BSA (12-30): 8.2 CM2
BH CV ECHO MEAS - LV V1 MAX: 105.3 CM/SEC
BH CV ECHO MEAS - LV V1 VTI: 21.1 CM
BH CV ECHO MEAS - LVIDD: 4.5 CM
BH CV ECHO MEAS - LVIDS: 3 CM
BH CV ECHO MEAS - LVOT AREA: 3 CM2
BH CV ECHO MEAS - LVOT DIAM: 1.94 CM
BH CV ECHO MEAS - LVPWD: 1.04 CM
BH CV ECHO MEAS - MED PEAK E' VEL: 11 CM/SEC
BH CV ECHO MEAS - MR MAX PG: 94.6 MMHG
BH CV ECHO MEAS - MR MAX VEL: 486.3 CM/SEC
BH CV ECHO MEAS - MV A DUR: 0.13 SEC
BH CV ECHO MEAS - MV A MAX VEL: 77.6 CM/SEC
BH CV ECHO MEAS - MV DEC SLOPE: 478.1 CM/SEC2
BH CV ECHO MEAS - MV DEC TIME: 0.17 SEC
BH CV ECHO MEAS - MV E MAX VEL: 68.1 CM/SEC
BH CV ECHO MEAS - MV E/A: 0.88
BH CV ECHO MEAS - MV MAX PG: 4.9 MMHG
BH CV ECHO MEAS - MV MEAN PG: 2.9 MMHG
BH CV ECHO MEAS - MV P1/2T: 75 MSEC
BH CV ECHO MEAS - MV V2 VTI: 23.6 CM
BH CV ECHO MEAS - MVA(P1/2T): 2.9 CM2
BH CV ECHO MEAS - MVA(VTI): 2.6 CM2
BH CV ECHO MEAS - PA ACC TIME: 0.16 SEC
BH CV ECHO MEAS - PA V2 MAX: 85 CM/SEC
BH CV ECHO MEAS - PULM A REVS DUR: 0.15 SEC
BH CV ECHO MEAS - PULM A REVS VEL: 39.4 CM/SEC
BH CV ECHO MEAS - PULM DIAS VEL: 39.4 CM/SEC
BH CV ECHO MEAS - PULM S/D: 1.93
BH CV ECHO MEAS - PULM SYS VEL: 75.9 CM/SEC
BH CV ECHO MEAS - QP/QS: 1.11
BH CV ECHO MEAS - RAP SYSTOLE: 3 MMHG
BH CV ECHO MEAS - RV MAX PG: 1.69 MMHG
BH CV ECHO MEAS - RV V1 MAX: 65 CM/SEC
BH CV ECHO MEAS - RV V1 VTI: 16.2 CM
BH CV ECHO MEAS - RVDD: 2.9 CM
BH CV ECHO MEAS - RVOT DIAM: 2.33 CM
BH CV ECHO MEAS - RVSP: 29.6 MMHG
BH CV ECHO MEAS - SI(MOD-SP4): 13.2 ML/M2
BH CV ECHO MEAS - SUP REN AO DIAM: 1.9 CM
BH CV ECHO MEAS - SV(LVOT): 62.3 ML
BH CV ECHO MEAS - SV(MOD-SP4): 24 ML
BH CV ECHO MEAS - SV(RVOT): 69.3 ML
BH CV ECHO MEAS - TAPSE (>1.6): 3.1 CM
BH CV ECHO MEAS - TR MAX PG: 26.6 MMHG
BH CV ECHO MEAS - TR MAX VEL: 257.9 CM/SEC
BH CV ECHO MEASUREMENTS AVERAGE E/E' RATIO: 6
BH CV XLRA - RV BASE: 2.6 CM
BH CV XLRA - RV LENGTH: 7 CM
BH CV XLRA - RV MID: 2.6 CM
BH CV XLRA - TDI S': 21.5 CM/SEC
LEFT ATRIUM VOLUME INDEX: 14.9 ML/M2
SINUS: 2.9 CM
STJ: 2.9 CM

## 2023-12-19 PROCEDURE — 73660 X-RAY EXAM OF TOE(S): CPT

## 2023-12-19 PROCEDURE — 93306 TTE W/DOPPLER COMPLETE: CPT

## 2023-12-20 ENCOUNTER — TELEPHONE (OUTPATIENT)
Dept: CARDIOLOGY | Facility: CLINIC | Age: 73
End: 2023-12-20
Payer: MEDICARE

## 2023-12-20 NOTE — TELEPHONE ENCOUNTER
Called and left VM. Will continue to try to reach patient. HUB transfer call to triage.     Elizabeth Cash RN  Triage [facility]

## 2023-12-20 NOTE — PROGRESS NOTES
Can we please call patient and let her know that her heart ultrasound was normal.  All of her valves are working fine, her heart function is normal, and there are no signs of any congestive heart failure.  There is a mild amount of stiffness to the heart muscle, likely related to age, but this is not causing any significant fluid retention as the ultrasound shows no signs of congestive heart failure.  Thank you for the help

## 2023-12-20 NOTE — TELEPHONE ENCOUNTER
----- Message from Candelario Parisi MD sent at 12/20/2023  9:43 AM EST -----  Can we please call patient and let her know that her heart ultrasound was normal.  All of her valves are working fine, her heart function is normal, and there are no signs of any congestive heart failure.  There is a mild amount of stiffness to the heart muscle, likely related to age, but this is not causing any significant fluid retention as the ultrasound shows no signs of congestive heart failure.  Thank you for the help

## 2023-12-21 NOTE — TELEPHONE ENCOUNTER
Called and left VM. Will continue to try to reach patient. HUB transfer call to triage.     Elizabeth Cash RN  Triage [facility]

## 2023-12-22 NOTE — TELEPHONE ENCOUNTER
Called and left VM. Will continue to try to reach patient. HUB transfer call to triage.     Elizabeth Cash RN  Triage [facility]

## 2024-01-01 ENCOUNTER — HOSPITAL ENCOUNTER (EMERGENCY)
Facility: HOSPITAL | Age: 74
Discharge: HOME | End: 2024-01-01
Attending: STUDENT IN AN ORGANIZED HEALTH CARE EDUCATION/TRAINING PROGRAM | Admitting: STUDENT IN AN ORGANIZED HEALTH CARE EDUCATION/TRAINING PROGRAM
Payer: COMMERCIAL

## 2024-01-01 VITALS
SYSTOLIC BLOOD PRESSURE: 120 MMHG | HEART RATE: 79 BPM | BODY MASS INDEX: 31.01 KG/M2 | WEIGHT: 175 LBS | DIASTOLIC BLOOD PRESSURE: 76 MMHG | HEIGHT: 63 IN | TEMPERATURE: 98.9 F | OXYGEN SATURATION: 95 % | RESPIRATION RATE: 20 BRPM

## 2024-01-01 DIAGNOSIS — B97.89 VIRAL RESPIRATORY ILLNESS: ICD-10-CM

## 2024-01-01 DIAGNOSIS — E87.1 HYPONATREMIA: Primary | ICD-10-CM

## 2024-01-01 DIAGNOSIS — J98.8 VIRAL RESPIRATORY ILLNESS: ICD-10-CM

## 2024-01-01 DIAGNOSIS — M79.10 MYALGIA: ICD-10-CM

## 2024-01-01 LAB
ANION GAP SERPL CALCULATED.3IONS-SCNC: 6.9 MMOL/L (ref 5–15)
BASOPHILS # BLD AUTO: 0.01 10*3/MM3 (ref 0–0.2)
BASOPHILS NFR BLD AUTO: 0.3 % (ref 0–1.5)
BUN SERPL-MCNC: 22 MG/DL (ref 8–23)
BUN/CREAT SERPL: 33.3 (ref 7–25)
CALCIUM SPEC-SCNC: 8.5 MG/DL (ref 8.6–10.5)
CHLORIDE SERPL-SCNC: 92 MMOL/L (ref 98–107)
CO2 SERPL-SCNC: 26.1 MMOL/L (ref 22–29)
CREAT SERPL-MCNC: 0.66 MG/DL (ref 0.57–1)
DEPRECATED RDW RBC AUTO: 43 FL (ref 37–54)
EGFRCR SERPLBLD CKD-EPI 2021: 92.8 ML/MIN/1.73
EOSINOPHIL # BLD AUTO: 0.01 10*3/MM3 (ref 0–0.4)
EOSINOPHIL NFR BLD AUTO: 0.3 % (ref 0.3–6.2)
ERYTHROCYTE [DISTWIDTH] IN BLOOD BY AUTOMATED COUNT: 12.4 % (ref 12.3–15.4)
GLUCOSE SERPL-MCNC: 103 MG/DL (ref 65–99)
HCT VFR BLD AUTO: 42.7 % (ref 34–46.6)
HGB BLD-MCNC: 14.3 G/DL (ref 12–15.9)
IMM GRANULOCYTES # BLD AUTO: 0.01 10*3/MM3 (ref 0–0.05)
IMM GRANULOCYTES NFR BLD AUTO: 0.3 % (ref 0–0.5)
LYMPHOCYTES # BLD AUTO: 1.38 10*3/MM3 (ref 0.7–3.1)
LYMPHOCYTES NFR BLD AUTO: 38.2 % (ref 19.6–45.3)
MAGNESIUM SERPL-MCNC: 1.8 MG/DL (ref 1.6–2.4)
MCH RBC QN AUTO: 30.8 PG (ref 26.6–33)
MCHC RBC AUTO-ENTMCNC: 33.5 G/DL (ref 31.5–35.7)
MCV RBC AUTO: 92 FL (ref 79–97)
MONOCYTES # BLD AUTO: 0.58 10*3/MM3 (ref 0.1–0.9)
MONOCYTES NFR BLD AUTO: 16.1 % (ref 5–12)
NEUTROPHILS NFR BLD AUTO: 1.62 10*3/MM3 (ref 1.7–7)
NEUTROPHILS NFR BLD AUTO: 44.8 % (ref 42.7–76)
PLATELET # BLD AUTO: 164 10*3/MM3 (ref 140–450)
PMV BLD AUTO: 10.1 FL (ref 6–12)
POTASSIUM SERPL-SCNC: 4.2 MMOL/L (ref 3.5–5.2)
RBC # BLD AUTO: 4.64 10*6/MM3 (ref 3.77–5.28)
SODIUM SERPL-SCNC: 125 MMOL/L (ref 136–145)
WBC NRBC COR # BLD AUTO: 3.61 10*3/MM3 (ref 3.4–10.8)

## 2024-01-01 PROCEDURE — 85025 COMPLETE CBC W/AUTO DIFF WBC: CPT | Performed by: STUDENT IN AN ORGANIZED HEALTH CARE EDUCATION/TRAINING PROGRAM

## 2024-01-01 PROCEDURE — 25810000003 SODIUM CHLORIDE 0.9 % SOLUTION: Performed by: STUDENT IN AN ORGANIZED HEALTH CARE EDUCATION/TRAINING PROGRAM

## 2024-01-01 PROCEDURE — 80048 BASIC METABOLIC PNL TOTAL CA: CPT | Performed by: STUDENT IN AN ORGANIZED HEALTH CARE EDUCATION/TRAINING PROGRAM

## 2024-01-01 PROCEDURE — 99283 EMERGENCY DEPT VISIT LOW MDM: CPT

## 2024-01-01 PROCEDURE — 83735 ASSAY OF MAGNESIUM: CPT | Performed by: STUDENT IN AN ORGANIZED HEALTH CARE EDUCATION/TRAINING PROGRAM

## 2024-01-01 PROCEDURE — 96360 HYDRATION IV INFUSION INIT: CPT

## 2024-01-01 RX ORDER — ONDANSETRON 4 MG/1
4 TABLET, ORALLY DISINTEGRATING ORAL EVERY 8 HOURS PRN
Qty: 9 TABLET | Refills: 0 | Status: SHIPPED | OUTPATIENT
Start: 2024-01-01 | End: 2024-01-04

## 2024-01-01 RX ADMIN — SODIUM CHLORIDE 1000 ML: 9 INJECTION, SOLUTION INTRAVENOUS at 13:15

## 2024-01-01 NOTE — ED NOTES
"Pt verbalizing that she is very unhappy with treatment in ED. Pt states that she doesn't want the Zofran RX as it doesn't do any good. I asked the patient if there was a medicine she knew of that would help. Pt stated \"that wasn't the problem\". Pt states the problem is, is that \"no one is listening to her that they just want to throw medicine at you and not fix the problem\". \"Her vertigo is the problem and its horrible\". \"They won't listen to me though, why won't they give someone as sick as me a antibiotic\". I offered twice to have Dr. Frias come back in the room the room and talk with her but she didn't want me to do that. Pt left the ED in stable condition  "

## 2024-01-01 NOTE — ED NOTES
Pt states she has had a neg covid/flu recently ands does not think that is the problem.  Refuses to have test, pt does allow iv stick for blood draw.

## 2024-01-01 NOTE — ED PROVIDER NOTES
"Subjective   History of Present Illness  Pt is a 73 y.o. female with PMH as listed who presents for   Chief Complaint   Patient presents with    Cough    Fever       Patient 73-year-old female with past medical city listed below presents for cough, fever, rhinorrhea and congestion for the past 3 weeks worse over the past few days.  States that she has been having bodyaches and muscle spasms over this time as well.  Has been seen by her PCP for this.  States that she had negative COVID and flu swabs but does not know where or when the swabs took place and there is no record both in [facility] system or in care everywhere.  Patient is specifically concerned that she has an abnormal magnesium level related to taking vitamin D recently.  She states that she is read that this could be the cause of her symptoms and wants to be evaluated for this.  No other new complaints.    Review of Systems    Past Medical History:   Diagnosis Date    SABAS (acute kidney injury)     2019    Arrhythmia     Diastolic dysfunction     Dizziness     Hyperlipidemia     Near syncope     Orthostatic hypotension     Osteoporosis     PVC (premature ventricular contraction)     Sleep apnea     wears C-Pap @ bedtime    Stroke        Allergies   Allergen Reactions    Caffeine Palpitations and Other (See Comments)     SVT...    Cephalexin Palpitations and Other (See Comments)    Cyclobenzaprine Unknown - Low Severity and Other (See Comments)     Wakes up \"gasping for air\"    Naproxen Other (See Comments) and Palpitations     Patient wakes up \"gasping for air\".  Kidney injury    Alendronate Rash    Beta Adrenergic Blockers Other (See Comments)     Breathing problems      Diltiazem Other (See Comments)     Pt reports more dysrhythmias with this medication, but she took 40mg this AM.      Ibuprofen Other (See Comments)    Ranitidine Hcl Other (See Comments)    Sugar-Protein-Starch Unknown - Low Severity    Wheat Extract GI Intolerance     Other reaction(s): " GI Intolerance, Stomach Upset    Aspirin Rash and Other (See Comments)    Clarithromycin Palpitations    Levofloxacin Other (See Comments) and Rash    Ranitidine Other (See Comments)     Patient doesn't know if she has a reaction to this medication, she was taking this with the other medications that were causing her issues.       Past Surgical History:   Procedure Laterality Date    CARPAL TUNNEL RELEASE         Family History   Problem Relation Age of Onset    Alzheimer's disease Father     Supraventricular tachycardia Father     Diabetes Mother     Arthritis Mother     Colon cancer Neg Hx     Colon polyps Neg Hx     Liver cancer Neg Hx     Stomach cancer Neg Hx     Breast cancer Neg Hx     Ovarian cancer Neg Hx     Uterine cancer Neg Hx        Social History     Socioeconomic History    Marital status:    Tobacco Use    Smoking status: Never    Smokeless tobacco: Never   Vaping Use    Vaping Use: Never used   Substance and Sexual Activity    Alcohol use: Never    Drug use: Never    Sexual activity: Defer           Objective   Physical Exam  Constitutional:       Appearance: Normal appearance.   HENT:      Head: Normocephalic and atraumatic.      Mouth/Throat:      Mouth: Mucous membranes are moist.      Pharynx: Oropharynx is clear.   Eyes:      Conjunctiva/sclera: Conjunctivae normal.   Cardiovascular:      Rate and Rhythm: Normal rate.   Pulmonary:      Effort: Pulmonary effort is normal.   Abdominal:      General: Abdomen is flat.   Musculoskeletal:      Cervical back: Neck supple.   Skin:     General: Skin is warm and dry.   Neurological:      Mental Status: She is alert.   Psychiatric:         Mood and Affect: Mood normal.         Procedures           ED Course  ED Course as of 01/01/24 1330   Mon Jan 01, 2024   1215 Patient presents with complaint of fever, congestion, rhinorrhea for 3 weeks worse over the past few days as well as muscle cramps.  Exam is unremarkable.  Will obtain CBC, BMP, magnesium  level as well as COVID/flu swab. [JF]   1328 Lab work significant for hyponatremia with sodium of 125, glucose 103.  Magnesium normal, CBC unremarkable.  Spoke with NP from patient's PCP office who agrees with plan for fluid bolus here in the ED and discharged with fluid restriction and encourage p.o. salt intake.  On discussion of this patient states that she has been limiting her salt intake and has been watching very closely.  Has also had less p.o. intake recently, likely causing patient's hyponatremia today.  Patient understands and agrees with plan of care.  Given ODT Zofran prescription should she experience any nausea.  All questions answered. [JF]      ED Course User Index  [JF] Josiah Frias MD                                             Medical Decision Making  My differential diagnosis for fever includes but is not limited:  To viral infections including COVID-19, bacterial infections, fungal infections, fever of unknown origin, auto regulatory dysfunction, hyperthermia, heat exhaustion, heat stroke, malignant neuroleptic syndrome and others.      Problems Addressed:  Hyponatremia: complicated acute illness or injury  Myalgia: complicated acute illness or injury  Viral respiratory illness: complicated acute illness or injury    Amount and/or Complexity of Data Reviewed  Labs: ordered.     Details: Sodium 125, rest of lab work unremarkable.  Will treat with fluid bolus in the ED and fluid restriction and increase p.o. sodium with PCP follow-up for recheck this week.    Discussion of management or test interpretation with external provider(s): Spoke with DOTTIE Chu for pts PCP. Discussed pts abnormal sodium and plan for fluid bolus, fluid restriction, and encouraged a high sodium diet.  He states that he will inform patient's PCP and ensure that she has follow-up this week.  Encouraged her to call tomorrow to facilitate appointment this week.  Will discuss this with  patient.    Risk  Prescription drug management.        Final diagnoses:   Hyponatremia   Myalgia   Viral respiratory illness       ED Disposition  ED Disposition       ED Disposition   Discharge    Condition   Stable    Comment   --               Suhail Judge MD  [facility] [place] [address] [phone]    Schedule an appointment as soon as possible for a visit in 1 day  For re-evaluation         Medication List        New Prescriptions      ondansetron ODT 4 MG disintegrating tablet  Commonly known as: ZOFRAN-ODT  Place 1 tablet on the tongue Every 8 (Eight) Hours As Needed for Nausea for up to 3 days.               Where to Get Your Medications        These medications were sent to [id number] - [place] [facility] - [address] - [phone]  - [phone] FX  [address] [address]      Phone: [phone]   ondansetron ODT 4 MG disintegrating tablet            Josiah Frias MD  01/01/24 4472

## 2024-01-23 ENCOUNTER — OFFICE VISIT (OUTPATIENT)
Dept: GASTROENTEROLOGY | Facility: CLINIC | Age: 74
End: 2024-01-23
Payer: COMMERCIAL

## 2024-01-23 ENCOUNTER — LAB (OUTPATIENT)
Dept: LAB | Facility: HOSPITAL | Age: 74
End: 2024-01-23
Payer: COMMERCIAL

## 2024-01-23 VITALS
SYSTOLIC BLOOD PRESSURE: 110 MMHG | DIASTOLIC BLOOD PRESSURE: 74 MMHG | BODY MASS INDEX: 31.04 KG/M2 | HEIGHT: 63 IN | WEIGHT: 175.2 LBS

## 2024-01-23 DIAGNOSIS — R11.0 NAUSEA: Primary | ICD-10-CM

## 2024-01-23 DIAGNOSIS — K21.00 GASTROESOPHAGEAL REFLUX DISEASE WITH ESOPHAGITIS WITHOUT HEMORRHAGE: ICD-10-CM

## 2024-01-23 DIAGNOSIS — R11.0 NAUSEA: ICD-10-CM

## 2024-01-23 DIAGNOSIS — R79.89 ELEVATED LFTS: ICD-10-CM

## 2024-01-23 LAB
ALBUMIN SERPL-MCNC: 4.5 G/DL (ref 3.5–5.2)
ALBUMIN/GLOB SERPL: 2 G/DL
ALP SERPL-CCNC: 63 U/L (ref 39–117)
ALT SERPL W P-5'-P-CCNC: 30 U/L (ref 1–33)
ANION GAP SERPL CALCULATED.3IONS-SCNC: 10 MMOL/L (ref 5–15)
AST SERPL-CCNC: 16 U/L (ref 1–32)
BILIRUB SERPL-MCNC: 0.2 MG/DL (ref 0–1.2)
BUN SERPL-MCNC: 21 MG/DL (ref 8–23)
BUN/CREAT SERPL: 35.6 (ref 7–25)
CALCIUM SPEC-SCNC: 9.3 MG/DL (ref 8.6–10.5)
CHLORIDE SERPL-SCNC: 104 MMOL/L (ref 98–107)
CO2 SERPL-SCNC: 24 MMOL/L (ref 22–29)
CREAT SERPL-MCNC: 0.59 MG/DL (ref 0.57–1)
EGFRCR SERPLBLD CKD-EPI 2021: 95.3 ML/MIN/1.73
GLOBULIN UR ELPH-MCNC: 2.3 GM/DL
GLUCOSE SERPL-MCNC: 97 MG/DL (ref 65–99)
POTASSIUM SERPL-SCNC: 4.4 MMOL/L (ref 3.5–5.2)
PROT SERPL-MCNC: 6.8 G/DL (ref 6–8.5)
SODIUM SERPL-SCNC: 138 MMOL/L (ref 136–145)

## 2024-01-23 PROCEDURE — 80053 COMPREHEN METABOLIC PANEL: CPT

## 2024-01-23 PROCEDURE — 36415 COLL VENOUS BLD VENIPUNCTURE: CPT

## 2024-01-23 PROCEDURE — 1159F MED LIST DOCD IN RCRD: CPT

## 2024-01-23 PROCEDURE — 99214 OFFICE O/P EST MOD 30 MIN: CPT

## 2024-01-23 PROCEDURE — 83013 H PYLORI (C-13) BREATH: CPT

## 2024-01-23 PROCEDURE — 1160F RVW MEDS BY RX/DR IN RCRD: CPT

## 2024-01-23 RX ORDER — RALOXIFENE HYDROCHLORIDE 60 MG/1
60 TABLET, FILM COATED ORAL DAILY
COMMUNITY
Start: 2024-01-03 | End: 2024-04-02

## 2024-01-23 NOTE — PROGRESS NOTES
PATIENT INFORMATION  Lidia Allen       - 1950    CHIEF COMPLAINT  Chief Complaint   Patient presents with    Nausea    Abdominal Pain    Bloated       HISTORY OF PRESENT ILLNESS    Here today for evaluation of nausea    Feeling really sick every day, often at night feels like needs to go to ER due to nausea and dizziness. Once went to ER and Na was 125. Went to see PCP and LFTs were elevated. Started on evista, took strong amoxicillin, took tylenol 2 pills in 1 day. Has gotten worse since then. Bloating for so long since then. Seems to be losing sodium.    Nausea is daily, stabbing pain and aching on right side, not every day. Bowels are moving several times a day, eating prunes for benefits of bones. Allergic to fosamax and does not do well on medications, so no longer treating osteoporosis. Reviewed zofran and does not take anything because how sensitive she is. Drinking less fluids because of her sodium. Fiber in diet is good.    Now having palpitations again and has seen cardiology. Reviewed low sodium and is concerned with liver dz. Reviewed sodium more of renal vs endocrine. Will call PCP to review.    Reviewed LFTs more likely related to acute illness vs antibiotics and will likely return to normal. Reviewed strong recommendations for EGD but patient anxious about ODIN and BP with anesthesia and will think about it and call us back. Patient not willing to take medications and also not willing to have endoscopy which impedes ability to properly diagnose and treat patient. Checking HP and patient aware would require 2 week course of treatment PPI and abx vs voquenza pack.    Per LOV: 2 months ago was complaining of epigastric pain and spasms, wanted to treat GERD, but not with PPI for osteoporosis concerns. Plan was trial BID pepcid, reports severe muscles cramps and has since stopped. Spasms have improved with dietary changes. Reviewed strong recommendation of screening EGD especially in  "absence of treating reflux. Refusing right now, and patient will discuss with her neurologist and call back. Sometimes feels constipation. Stroke 2017 with some dysphagia since, sometimes foods would get stuck after, but not severe. Really concerned with osteoporosis, states cannot tolerate calcium, will not treat osteoporosis.      Last colonoscopy 2019 was normal      Nausea  Associated symptoms include abdominal pain, chills, congestion, coughing, nausea and weakness.   Abdominal Pain  Associated symptoms include constipation, diarrhea and nausea.       REVIEWED PERTINENT RESULTS/ LABS  No results found for: \"CASEREPORT\", \"FINALDX\"  Lab Results   Component Value Date    HGB 14.3 01/01/2024    MCV 92.0 01/01/2024     01/01/2024    ALT 20 12/06/2023    AST 19 12/06/2023    INR 1.0 02/22/2021    TRIG 82 02/04/2020      No results found.    REVIEW OF SYSTEMS  Review of Systems   Constitutional:  Positive for chills.   HENT:  Positive for congestion.    Eyes: Negative.    Respiratory:  Positive for cough.    Cardiovascular: Negative.    Gastrointestinal:  Positive for abdominal distention, abdominal pain, constipation, diarrhea and nausea.   Endocrine: Negative.    Genitourinary: Negative.    Musculoskeletal: Negative.    Skin: Negative.    Allergic/Immunologic: Positive for food allergies.   Neurological:  Positive for dizziness and weakness.   Psychiatric/Behavioral: Negative.           ACTIVE PROBLEMS  Patient Active Problem List    Diagnosis     Well woman exam with routine gynecological exam [Z01.419]     Gastroesophageal reflux disease with esophagitis without hemorrhage [K21.00]     Palpitations [R00.2]     Orthostatic lightheadedness [R42]          PAST MEDICAL HISTORY  Past Medical History:   Diagnosis Date    SABAS (acute kidney injury)     2019    Arrhythmia     Diastolic dysfunction     Dizziness     GERD (gastroesophageal reflux disease) In the past.    Much improved    Hyperlipidemia     " Hypertension Over a year ago.    Elevated BP disappeared after diet from Dr. Jenkins, [facility]. Vegan/No Oil Diet.    Lactose intolerance Years    Too sweet    Near syncope     Orthostatic hypotension     Osteoporosis     PVC (premature ventricular contraction)     Sleep apnea     wears C-Pap @ bedtime    Stroke          SURGICAL HISTORY  Past Surgical History:   Procedure Laterality Date    CARPAL TUNNEL RELEASE           FAMILY HISTORY  Family History   Problem Relation Age of Onset    Alzheimer's disease Father     Supraventricular tachycardia Father     Diabetes Mother     Arthritis Mother     Colon cancer Neg Hx     Colon polyps Neg Hx     Liver cancer Neg Hx     Stomach cancer Neg Hx     Breast cancer Neg Hx     Ovarian cancer Neg Hx     Uterine cancer Neg Hx          SOCIAL HISTORY  Social History     Occupational History    Not on file   Tobacco Use    Smoking status: Never    Smokeless tobacco: Never   Vaping Use    Vaping Use: Never used   Substance and Sexual Activity    Alcohol use: Never    Drug use: Never    Sexual activity: Defer         CURRENT MEDICATIONS    Current Outpatient Medications:     ascorbic acid (VITAMIN C) 250 MG tablet, Take 1 tablet by mouth Daily., Disp: , Rfl:     b complex vitamins capsule, Take 1 capsule by mouth., Disp: , Rfl:     CALCIUM PO, Take 720 mg by mouth Daily., Disp: , Rfl:     cholecalciferol (Vitamin D, Cholecalciferol,) 25 MCG (1000 UT) tablet, Take 5 tablets by mouth Daily., Disp: , Rfl:     Magnesium Chloride 200 MG/ML solution, Inject 200 mg under the skin into the appropriate area as directed Daily., Disp: , Rfl:     raloxifene (EVISTA) 60 MG tablet, Take 1 tablet by mouth Daily., Disp: , Rfl:     ALLERGIES  Caffeine, Cephalexin, Cyclobenzaprine, Naproxen, Alendronate, Beta adrenergic blockers, Diltiazem, Ibuprofen, Ranitidine hcl, Sugar-protein-starch, Wheat extract, Aspirin, Clarithromycin, Levofloxacin, and Ranitidine    VITALS  Vitals:    01/23/24  "0943   BP: 110/74   BP Location: Left arm   Patient Position: Sitting   Cuff Size: Large Adult   Weight: 79.5 kg (175 lb 3.2 oz)   Height: 160 cm (62.99\")       PHYSICAL EXAM  Debilities/Disabilities Identified: None  Emotional Behavior: Appropriate  Wt Readings from Last 3 Encounters:   01/23/24 79.5 kg (175 lb 3.2 oz)   01/01/24 79.4 kg (175 lb)   12/19/23 78.5 kg (173 lb)     Ht Readings from Last 1 Encounters:   01/23/24 160 cm (62.99\")     Body mass index is 31.04 kg/m².  Physical Exam  Constitutional:       General: She is not in acute distress.     Appearance: Normal appearance. She is not ill-appearing.   HENT:      Head: Normocephalic and atraumatic.      Mouth/Throat:      Mouth: Mucous membranes are moist.      Pharynx: No posterior oropharyngeal erythema.   Eyes:      General: No scleral icterus.  Cardiovascular:      Rate and Rhythm: Normal rate and regular rhythm.      Heart sounds: Normal heart sounds.   Pulmonary:      Effort: Pulmonary effort is normal.      Breath sounds: Normal breath sounds.   Abdominal:      General: Abdomen is flat. Bowel sounds are normal. There is no distension.      Palpations: Abdomen is soft. There is no mass.      Tenderness: There is no abdominal tenderness. There is no guarding or rebound. Negative signs include Salas's sign.      Hernia: No hernia is present.   Musculoskeletal:      Cervical back: Neck supple.   Skin:     General: Skin is warm.      Capillary Refill: Capillary refill takes less than 2 seconds.   Neurological:      General: No focal deficit present.      Mental Status: She is alert and oriented to person, place, and time.   Psychiatric:         Mood and Affect: Mood normal.         Behavior: Behavior normal.         Thought Content: Thought content normal.         Judgment: Judgment normal.         CLINICAL DATA REVIEWED   reviewed previous lab results and integrated with today's visit, reviewed notes from other physicians and/or last GI encounter, " reviewed previous endoscopy results and available photos, reviewed surgical pathology results from previous biopsies    ASSESSMENT  Diagnoses and all orders for this visit:    Nausea  -     Cancel: H. Pylori Breath Test - Breath, Lung; Future  -     H. Pylori Breath Test - Breath, Lung; Future  -     Comprehensive Metabolic Panel    Elevated LFTs  -     H. Pylori Breath Test - Breath, Lung; Future    Gastroesophageal reflux disease with esophagitis without hemorrhage    Other orders  -     raloxifene (EVISTA) 60 MG tablet; Take 1 tablet by mouth Daily.          PLAN    HP breath test  LFTs today and then will repeat  Pt to call if decides to have EGD    No follow-ups on file.    I have discussed the above plan with the patient.  They verbalize understanding and are in agreement with the plan.  They have been advised to contact the office for any questions, concerns, or changes related to their health.

## 2024-01-24 ENCOUNTER — PATIENT MESSAGE (OUTPATIENT)
Dept: GASTROENTEROLOGY | Facility: CLINIC | Age: 74
End: 2024-01-24
Payer: COMMERCIAL

## 2024-01-24 LAB — UREA BREATH TEST QL: POSITIVE

## 2024-01-25 ENCOUNTER — CLINICAL SUPPORT NO REQUIREMENTS (OUTPATIENT)
Dept: CARDIOLOGY | Facility: CLINIC | Age: 74
End: 2024-01-25
Payer: COMMERCIAL

## 2024-01-25 DIAGNOSIS — I49.1 PREMATURE ATRIAL COMPLEXES: Primary | ICD-10-CM

## 2024-01-25 DIAGNOSIS — B96.81 HELICOBACTER PYLORI GASTRITIS: Primary | ICD-10-CM

## 2024-01-25 DIAGNOSIS — K29.70 HELICOBACTER PYLORI GASTRITIS: Primary | ICD-10-CM

## 2024-01-25 DIAGNOSIS — R00.2 PALPITATIONS: ICD-10-CM

## 2024-02-12 ENCOUNTER — TELEPHONE (OUTPATIENT)
Age: 74
End: 2024-02-12
Payer: COMMERCIAL

## 2024-02-17 ENCOUNTER — APPOINTMENT (OUTPATIENT)
Dept: GENERAL RADIOLOGY | Facility: HOSPITAL | Age: 74
End: 2024-02-17
Payer: COMMERCIAL

## 2024-02-17 ENCOUNTER — APPOINTMENT (OUTPATIENT)
Dept: MRI IMAGING | Facility: HOSPITAL | Age: 74
End: 2024-02-17
Payer: COMMERCIAL

## 2024-02-17 ENCOUNTER — HOSPITAL ENCOUNTER (EMERGENCY)
Facility: HOSPITAL | Age: 74
Discharge: HOME | End: 2024-02-17
Attending: STUDENT IN AN ORGANIZED HEALTH CARE EDUCATION/TRAINING PROGRAM
Payer: COMMERCIAL

## 2024-02-17 VITALS
DIASTOLIC BLOOD PRESSURE: 91 MMHG | HEIGHT: 63 IN | TEMPERATURE: 98.8 F | BODY MASS INDEX: 32.07 KG/M2 | WEIGHT: 181 LBS | HEART RATE: 89 BPM | OXYGEN SATURATION: 94 % | RESPIRATION RATE: 18 BRPM | SYSTOLIC BLOOD PRESSURE: 148 MMHG

## 2024-02-17 DIAGNOSIS — R29.898 BILATERAL LEG WEAKNESS: ICD-10-CM

## 2024-02-17 DIAGNOSIS — R42 DIZZINESS: Primary | ICD-10-CM

## 2024-02-17 DIAGNOSIS — R11.0 NAUSEA: ICD-10-CM

## 2024-02-17 DIAGNOSIS — R26.9 GAIT DISTURBANCE: ICD-10-CM

## 2024-02-17 LAB
ALBUMIN SERPL-MCNC: 4.3 G/DL (ref 3.5–5.2)
ALBUMIN/GLOB SERPL: 1.7 G/DL
ALP SERPL-CCNC: 63 U/L (ref 39–117)
ALT SERPL W P-5'-P-CCNC: 37 U/L (ref 1–33)
ANION GAP SERPL CALCULATED.3IONS-SCNC: 12.1 MMOL/L (ref 5–15)
AST SERPL-CCNC: 22 U/L (ref 1–32)
BASOPHILS # BLD AUTO: 0.03 10*3/MM3 (ref 0–0.2)
BASOPHILS NFR BLD AUTO: 0.4 % (ref 0–1.5)
BILIRUB SERPL-MCNC: 0.3 MG/DL (ref 0–1.2)
BILIRUB UR QL STRIP: NEGATIVE
BUN SERPL-MCNC: 21 MG/DL (ref 8–23)
BUN/CREAT SERPL: 33.9 (ref 7–25)
CALCIUM SPEC-SCNC: 9.1 MG/DL (ref 8.6–10.5)
CHLORIDE SERPL-SCNC: 104 MMOL/L (ref 98–107)
CLARITY UR: CLEAR
CO2 SERPL-SCNC: 22.9 MMOL/L (ref 22–29)
COLOR UR: YELLOW
CREAT SERPL-MCNC: 0.62 MG/DL (ref 0.57–1)
DEPRECATED RDW RBC AUTO: 41.4 FL (ref 37–54)
EGFRCR SERPLBLD CKD-EPI 2021: 94.2 ML/MIN/1.73
EOSINOPHIL # BLD AUTO: 0.16 10*3/MM3 (ref 0–0.4)
EOSINOPHIL NFR BLD AUTO: 2.1 % (ref 0.3–6.2)
ERYTHROCYTE [DISTWIDTH] IN BLOOD BY AUTOMATED COUNT: 12.8 % (ref 12.3–15.4)
GLOBULIN UR ELPH-MCNC: 2.6 GM/DL
GLUCOSE SERPL-MCNC: 137 MG/DL (ref 65–99)
GLUCOSE UR STRIP-MCNC: NEGATIVE MG/DL
HCT VFR BLD AUTO: 45.2 % (ref 34–46.6)
HGB BLD-MCNC: 14.6 G/DL (ref 12–15.9)
HGB UR QL STRIP.AUTO: NEGATIVE
HOLD SPECIMEN: NORMAL
HOLD SPECIMEN: NORMAL
IMM GRANULOCYTES # BLD AUTO: 0.02 10*3/MM3 (ref 0–0.05)
IMM GRANULOCYTES NFR BLD AUTO: 0.3 % (ref 0–0.5)
KETONES UR QL STRIP: NEGATIVE
LEUKOCYTE ESTERASE UR QL STRIP.AUTO: NEGATIVE
LYMPHOCYTES # BLD AUTO: 2.01 10*3/MM3 (ref 0.7–3.1)
LYMPHOCYTES NFR BLD AUTO: 26.1 % (ref 19.6–45.3)
MAGNESIUM SERPL-MCNC: 2.1 MG/DL (ref 1.6–2.4)
MCH RBC QN AUTO: 29.3 PG (ref 26.6–33)
MCHC RBC AUTO-ENTMCNC: 32.3 G/DL (ref 31.5–35.7)
MCV RBC AUTO: 90.6 FL (ref 79–97)
MONOCYTES # BLD AUTO: 0.53 10*3/MM3 (ref 0.1–0.9)
MONOCYTES NFR BLD AUTO: 6.9 % (ref 5–12)
NEUTROPHILS NFR BLD AUTO: 4.94 10*3/MM3 (ref 1.7–7)
NEUTROPHILS NFR BLD AUTO: 64.2 % (ref 42.7–76)
NITRITE UR QL STRIP: NEGATIVE
NRBC BLD AUTO-RTO: 0 /100 WBC (ref 0–0.2)
PH UR STRIP.AUTO: 7.5 [PH] (ref 5–8)
PLATELET # BLD AUTO: 270 10*3/MM3 (ref 140–450)
PMV BLD AUTO: 9.5 FL (ref 6–12)
POTASSIUM SERPL-SCNC: 4.1 MMOL/L (ref 3.5–5.2)
PROT SERPL-MCNC: 6.9 G/DL (ref 6–8.5)
PROT UR QL STRIP: NEGATIVE
QT INTERVAL: 375 MS
QTC INTERVAL: 425 MS
RBC # BLD AUTO: 4.99 10*6/MM3 (ref 3.77–5.28)
SODIUM SERPL-SCNC: 139 MMOL/L (ref 136–145)
SP GR UR STRIP: 1.01 (ref 1–1.03)
TROPONIN T SERPL HS-MCNC: 10 NG/L
UROBILINOGEN UR QL STRIP: NORMAL
WBC NRBC COR # BLD AUTO: 7.69 10*3/MM3 (ref 3.4–10.8)
WHOLE BLOOD HOLD COAG: NORMAL
WHOLE BLOOD HOLD SPECIMEN: NORMAL

## 2024-02-17 PROCEDURE — 93010 ELECTROCARDIOGRAM REPORT: CPT | Performed by: INTERNAL MEDICINE

## 2024-02-17 PROCEDURE — 71045 X-RAY EXAM CHEST 1 VIEW: CPT

## 2024-02-17 PROCEDURE — 99284 EMERGENCY DEPT VISIT MOD MDM: CPT

## 2024-02-17 PROCEDURE — 84484 ASSAY OF TROPONIN QUANT: CPT

## 2024-02-17 PROCEDURE — 83735 ASSAY OF MAGNESIUM: CPT

## 2024-02-17 PROCEDURE — 70551 MRI BRAIN STEM W/O DYE: CPT

## 2024-02-17 PROCEDURE — 85025 COMPLETE CBC W/AUTO DIFF WBC: CPT

## 2024-02-17 PROCEDURE — 80053 COMPREHEN METABOLIC PANEL: CPT

## 2024-02-17 PROCEDURE — 81003 URINALYSIS AUTO W/O SCOPE: CPT

## 2024-02-17 PROCEDURE — 93005 ELECTROCARDIOGRAM TRACING: CPT

## 2024-02-17 RX ORDER — SODIUM CHLORIDE 0.9 % (FLUSH) 0.9 %
10 SYRINGE (ML) INJECTION AS NEEDED
Status: DISCONTINUED | OUTPATIENT
Start: 2024-02-17 | End: 2024-02-17 | Stop reason: HOSPADM

## 2024-02-17 RX ORDER — MECLIZINE HYDROCHLORIDE 25 MG/1
25 TABLET ORAL 3 TIMES DAILY PRN
Qty: 30 TABLET | Refills: 0 | Status: SHIPPED | OUTPATIENT
Start: 2024-02-17 | End: 2024-02-17 | Stop reason: SDUPTHER

## 2024-02-17 RX ORDER — MECLIZINE HYDROCHLORIDE 25 MG/1
25 TABLET ORAL 3 TIMES DAILY PRN
Qty: 30 TABLET | Refills: 0 | Status: SHIPPED | OUTPATIENT
Start: 2024-02-17

## 2024-02-17 NOTE — ED PROVIDER NOTES
EMERGENCY DEPARTMENT ENCOUNTER    Room Number:  22/22  PCP: Suhail Judge MD  History obtained from: Patient      HPI:  Chief Complaint: Dizziness  A complete HPI/ROS/PMH/PSH/SH/FH are unobtainable due to: N/A  Context: Lidia Allen is a 73 y.o. female who presents to the ED c/o dizziness.  On and off for the last several weeks, worse over the last 3 to 4 days.  Associated nausea.  Also with difficulty walking, leg weakness.  Originally thought her sodium was low however at outside hospital her sodium was normal.  Patient has a history of a cerebellar stroke.  No other recent illness, fever, chills.            PAST MEDICAL HISTORY  Active Ambulatory Problems     Diagnosis Date Noted    Palpitations 01/16/2020    Orthostatic lightheadedness 01/16/2020    Gastroesophageal reflux disease with esophagitis without hemorrhage 03/08/2023    Well woman exam with routine gynecological exam 04/24/2023     Resolved Ambulatory Problems     Diagnosis Date Noted    No Resolved Ambulatory Problems     Past Medical History:   Diagnosis Date    SABAS (acute kidney injury)     Arrhythmia     Diastolic dysfunction     Dizziness     GERD (gastroesophageal reflux disease) In the past.    Hyperlipidemia     Hypertension Over a year ago.    Lactose intolerance Years    Near syncope     Orthostatic hypotension     Osteoporosis     PVC (premature ventricular contraction)     Sleep apnea     Stroke          PAST SURGICAL HISTORY  Past Surgical History:   Procedure Laterality Date    CARPAL TUNNEL RELEASE           FAMILY HISTORY  Family History   Problem Relation Age of Onset    Alzheimer's disease Father     Supraventricular tachycardia Father     Diabetes Mother     Arthritis Mother     Colon cancer Neg Hx     Colon polyps Neg Hx     Liver cancer Neg Hx     Stomach cancer Neg Hx     Breast cancer Neg Hx     Ovarian cancer Neg Hx     Uterine cancer Neg Hx          SOCIAL HISTORY  Social History     Socioeconomic History    Marital  status:    Tobacco Use    Smoking status: Never    Smokeless tobacco: Never   Vaping Use    Vaping Use: Never used   Substance and Sexual Activity    Alcohol use: Never    Drug use: Never    Sexual activity: Defer         ALLERGIES  Caffeine, Cephalexin, Cyclobenzaprine, Naproxen, Alendronate, Beta adrenergic blockers, Diltiazem, Ibuprofen, Ranitidine hcl, Sugar-protein-starch, Wheat extract, Aspirin, Clarithromycin, Levofloxacin, and Ranitidine        REVIEW OF SYSTEMS    As per HPI      PHYSICAL EXAM  ED Triage Vitals   Temp Heart Rate Resp BP SpO2   02/17/24 1217 02/17/24 1217 02/17/24 1217 02/17/24 1217 02/17/24 1217   98.8 °F (37.1 °C) 81 18 138/86 98 %      Temp src Heart Rate Source Patient Position BP Location FiO2 (%)   -- -- 02/17/24 1340 -- --     Lying         Physical Exam  Constitutional:       General: She is not in acute distress.  HENT:      Head: Normocephalic and atraumatic.   Cardiovascular:      Rate and Rhythm: Normal rate and regular rhythm.   Pulmonary:      Effort: Pulmonary effort is normal. No respiratory distress.   Abdominal:      General: There is no distension.      Palpations: Abdomen is soft.      Tenderness: There is no abdominal tenderness.   Musculoskeletal:         General: No swelling or deformity.   Skin:     General: Skin is warm and dry.   Neurological:      General: No focal deficit present.      Mental Status: She is alert. Mental status is at baseline.      Comments: No nystagmus, gait non-ataxic           Vital signs and nursing notes reviewed.          LAB RESULTS  Recent Results (from the past 24 hour(s))   Comprehensive Metabolic Panel    Collection Time: 02/17/24 12:46 PM    Specimen: Blood   Result Value Ref Range    Glucose 137 (H) 65 - 99 mg/dL    BUN 21 8 - 23 mg/dL    Creatinine 0.62 0.57 - 1.00 mg/dL    Sodium 139 136 - 145 mmol/L    Potassium 4.1 3.5 - 5.2 mmol/L    Chloride 104 98 - 107 mmol/L    CO2 22.9 22.0 - 29.0 mmol/L    Calcium 9.1 8.6 - 10.5  mg/dL    Total Protein 6.9 6.0 - 8.5 g/dL    Albumin 4.3 3.5 - 5.2 g/dL    ALT (SGPT) 37 (H) 1 - 33 U/L    AST (SGOT) 22 1 - 32 U/L    Alkaline Phosphatase 63 39 - 117 U/L    Total Bilirubin 0.3 0.0 - 1.2 mg/dL    Globulin 2.6 gm/dL    A/G Ratio 1.7 g/dL    BUN/Creatinine Ratio 33.9 (H) 7.0 - 25.0    Anion Gap 12.1 5.0 - 15.0 mmol/L    eGFR 94.2 >60.0 mL/min/1.73   Single High Sensitivity Troponin T    Collection Time: 02/17/24 12:46 PM    Specimen: Blood   Result Value Ref Range    HS Troponin T 10 <14 ng/L   Magnesium    Collection Time: 02/17/24 12:46 PM    Specimen: Blood   Result Value Ref Range    Magnesium 2.1 1.6 - 2.4 mg/dL   Green Top (Gel)    Collection Time: 02/17/24 12:46 PM   Result Value Ref Range    Extra Tube Hold for add-ons.    Lavender Top    Collection Time: 02/17/24 12:46 PM   Result Value Ref Range    Extra Tube hold for add-on    Gold Top - SST    Collection Time: 02/17/24 12:46 PM   Result Value Ref Range    Extra Tube Hold for add-ons.    Light Blue Top    Collection Time: 02/17/24 12:46 PM   Result Value Ref Range    Extra Tube Hold for add-ons.    CBC Auto Differential    Collection Time: 02/17/24 12:46 PM    Specimen: Blood   Result Value Ref Range    WBC 7.69 3.40 - 10.80 10*3/mm3    RBC 4.99 3.77 - 5.28 10*6/mm3    Hemoglobin 14.6 12.0 - 15.9 g/dL    Hematocrit 45.2 34.0 - 46.6 %    MCV 90.6 79.0 - 97.0 fL    MCH 29.3 26.6 - 33.0 pg    MCHC 32.3 31.5 - 35.7 g/dL    RDW 12.8 12.3 - 15.4 %    RDW-SD 41.4 37.0 - 54.0 fl    MPV 9.5 6.0 - 12.0 fL    Platelets 270 140 - 450 10*3/mm3    Neutrophil % 64.2 42.7 - 76.0 %    Lymphocyte % 26.1 19.6 - 45.3 %    Monocyte % 6.9 5.0 - 12.0 %    Eosinophil % 2.1 0.3 - 6.2 %    Basophil % 0.4 0.0 - 1.5 %    Immature Grans % 0.3 0.0 - 0.5 %    Neutrophils, Absolute 4.94 1.70 - 7.00 10*3/mm3    Lymphocytes, Absolute 2.01 0.70 - 3.10 10*3/mm3    Monocytes, Absolute 0.53 0.10 - 0.90 10*3/mm3    Eosinophils, Absolute 0.16 0.00 - 0.40 10*3/mm3    Basophils,  Absolute 0.03 0.00 - 0.20 10*3/mm3    Immature Grans, Absolute 0.02 0.00 - 0.05 10*3/mm3    nRBC 0.0 0.0 - 0.2 /100 WBC   ECG 12 Lead ED Triage Standing Order; Weak / Dizzy / AMS    Collection Time: 02/17/24  1:18 PM   Result Value Ref Range    QT Interval 375 ms    QTC Interval 425 ms   Urinalysis With Microscopic If Indicated (No Culture) - Urine, Clean Catch    Collection Time: 02/17/24  3:06 PM    Specimen: Urine, Clean Catch   Result Value Ref Range    Color, UA Yellow Yellow, Straw    Appearance, UA Clear Clear    pH, UA 7.5 5.0 - 8.0    Specific Gravity, UA 1.012 1.005 - 1.030    Glucose, UA Negative Negative    Ketones, UA Negative Negative    Bilirubin, UA Negative Negative    Blood, UA Negative Negative    Protein, UA Negative Negative    Leuk Esterase, UA Negative Negative    Nitrite, UA Negative Negative    Urobilinogen, UA 0.2 E.U./dL 0.2 - 1.0 E.U./dL       Ordered the above labs and reviewed the results.        RADIOLOGY  MRI Brain Without Contrast    Result Date: 2/17/2024  EMERGENCY MRI OF THE BRAIN WITHOUT CONTRAST ON 02/17/2024  CLINICAL HISTORY: Dizziness, gait disturbance and history of prior stroke.  TECHNIQUE: Axial T1, FLAIR, fat-suppressed T2, axial diffusion and gradient echo T2 and sagittal T1-weighted images were obtained of the entire head.  This is correlated to a prior MRI of the brain from [facility] on 06/19/2023.  FINDINGS: There is minimal T2 high signal in the periventricular white matter consistent with very minimal small vessel disease. There is an area of encephalomalacia in the posterior inferior medial left cerebellum that measures up to 4 x 2.2 x 2 cm compatible with an old left PICA territory infarct. This is unchanged since outside MRI on 06/29/2023. The remainder of the brain parenchyma is normal in signal intensity. Specifically no diffusion weighted abnormality is identified with no acute infarct seen. The ventricles are normal in size. I see no focal  mass effect and no midline shift and no extra-axial fluid collections are identified. There is small amount of fluid in the posterior left maxillary sinus. Mild anterior right sphenoid sinus mucosal thickening. The remainder of the paranasal sinuses and the mastoid air cells and the middle ear cavities are clear. Good flow voids are demonstrated within the cerebral vessels and in the dural venous sinuses. The calvarium and the skull base demonstrate normal marrow signal intensity. The orbits are normal in appearance.      1. No acute intracranial abnormality is identified with no change when compared to prior MRI of the brain on 06/29/2023. 2. There is a 4 x 2.2 x 2 cm old posterior inferior medial left cerebellar infarct in the left PICA territory and there is very minimal small vessel disease in cerebral white matter. There is small amount of fluid in the posterior left maxillary sinus and minimal anterior right sphenoid sinus mucosal thickening. The remainder of the MRI of the brain is normal. Specifically no acute infarct is identified.        XR Chest 1 View    Result Date: 2/17/2024  Portable chest radiograph  HISTORY: Weakness, dizziness, altered mental status  TECHNIQUE: Single AP portable radiograph of the chest  COMPARISON: None      FINDINGS AND IMPRESSION: Presumed loop recorder overlies the left hemithorax. No pulmonary consolidation, pleural effusion or pneumothorax is seen. Cardiac silhouette is within normal its for size.  This report was finalized on 2/17/2024 1:45 PM by Dr. Angel To M.D on Workstation: [id number]       Ordered the above noted radiological studies. Reviewed by me in PACS.                MEDICATIONS GIVEN IN ER  Medications   sodium chloride 0.9 % flush 10 mL (has no administration in time range)               MEDICAL DECISION MAKING, PROGRESS, and CONSULTS    MDM: Patient presented emergency department with nonspecific dizziness, otherwise well-appearing, vitals otherwise  stable.  Labs and imaging reassuring in the ER.  No evidence of acute infarct or other central process to explain her symptoms.  Discussed plan for admission with patient and family however patient would like to go home instead.  Will follow-up as an outpatient.  Given return precautions and discharged with meclizine as needed.  Given return precautions and discharged home.    All labs have been independently reviewed by me.  All radiology studies have been reviewed by me and I have also reviewed the radiology report.   EKG's independently viewed and interpreted by me.  Discussion below represents my analysis of pertinent findings related to patient's condition, differential diagnosis, treatment plan and final disposition.      Additional sources:  - Discussed/ obtained information from independent historians:      - External (non-ED) record review:     - Chronic or social conditions impacting care: Cerebellar stroke    - Shared decision making: Discussed plan for discharge, recommendation to return for any new or worsening symptoms.  Also informed of possibility of admission for additional testing and workup however patient declines.      Orders placed during this visit:  Orders Placed This Encounter   Procedures    XR Chest 1 View    MRI Brain Without Contrast    [place] Draw    Comprehensive Metabolic Panel    Single High Sensitivity Troponin T    Magnesium    Urinalysis With Microscopic If Indicated (No Culture) - Urine, Clean Catch    CBC Auto Differential    NPO Diet NPO Type: Strict NPO    Undress & Gown    Continuous Pulse Oximetry    Vital Signs    Orthostatic Blood Pressure    Oxygen Therapy- Nasal Cannula; Titrate 1-6 LPM Per SpO2; 90 - 95%    POC Glucose Once    ECG 12 Lead ED Triage Standing Order; Weak / Dizzy / AMS    Insert Peripheral IV    Fall Precautions    CBC & Differential    Green Top (Gel)    Lavender Top    Gold Top - SST    Light Blue Top         Additional orders considered but not  ordered:  Considered angiograms however no sign of acute vascular cause at this time.        Differential diagnosis includes but is not limited to:    Stroke, electrolyte abnormality, orthostasis, peripheral vertigo      Independent interpretation of labs, radiology studies, and discussions with consultants:  ED Course as of 02/17/24 1650   Sat Feb 17, 2024   1320 EKG interpreted myself:  1318, sinus rhythm rate of 77, no acute ST segment changes or T wave inversions. [FS]      ED Course User Index  [FS] David Tidwell MD           DIAGNOSIS  Final diagnoses:   Dizziness   Nausea   Gait disturbance   Bilateral leg weakness         DISPOSITION  Discharged home        Latest Documented Vital Signs:  As of 16:50 EST  BP- 135/83 HR- 89 Temp- 98.8 °F (37.1 °C) O2 sat- 94%              --    Please note that portions of this were completed with a voice recognition program.       Note Disclaimer: At [facility], we believe that sharing information builds trust and better relationships. You are receiving this note because you are receiving care at [facility] or recently visited. It is possible you will see health information before a provider has talked with you about it. This kind of information can be easy to misunderstand. To help you fully understand what it means for your health, we urge you to discuss this note with your provider.             David Tidwell MD  02/17/24 9877

## 2024-02-17 NOTE — ED NOTES
EMS states patient is being transferred here for an MRI for a possible stroke.    Patient c/o dizziness that has been steadily worsening over the last few weeks, recent diagnosis of SIADH. Patient ambulated from bathroom (where EMS dropped off patient) to ER room without assistance.

## 2024-02-17 NOTE — ED NOTES
Pt to er via ems for c/o dizziness, tremors in both legs, and HA intermittently for years. Pt started having increased weakness and unable to stand. Pt sent from [facility] ER for MRI.

## 2024-03-07 PROBLEM — B96.81 HELICOBACTER PYLORI GASTRITIS: Status: ACTIVE | Noted: 2024-03-07

## 2024-03-07 PROBLEM — R11.2 NAUSEA AND VOMITING: Status: ACTIVE | Noted: 2024-03-07

## 2024-03-07 PROBLEM — R10.13 DYSPEPSIA: Status: ACTIVE | Noted: 2024-03-07

## 2024-03-07 PROBLEM — K29.70 HELICOBACTER PYLORI GASTRITIS: Status: ACTIVE | Noted: 2024-03-07

## 2024-03-08 ENCOUNTER — OFFICE VISIT (OUTPATIENT)
Dept: GASTROENTEROLOGY | Facility: CLINIC | Age: 74
End: 2024-03-08
Payer: COMMERCIAL

## 2024-03-08 VITALS
WEIGHT: 182.5 LBS | DIASTOLIC BLOOD PRESSURE: 80 MMHG | BODY MASS INDEX: 32.34 KG/M2 | HEIGHT: 63 IN | SYSTOLIC BLOOD PRESSURE: 120 MMHG

## 2024-03-08 DIAGNOSIS — K29.70 HELICOBACTER PYLORI GASTRITIS: Primary | ICD-10-CM

## 2024-03-08 DIAGNOSIS — B96.81 HELICOBACTER PYLORI GASTRITIS: Primary | ICD-10-CM

## 2024-03-08 DIAGNOSIS — R14.0 ABDOMINAL BLOATING: ICD-10-CM

## 2024-03-08 DIAGNOSIS — R10.13 DYSPEPSIA: ICD-10-CM

## 2024-03-08 DIAGNOSIS — K21.9 GASTROESOPHAGEAL REFLUX DISEASE, UNSPECIFIED WHETHER ESOPHAGITIS PRESENT: ICD-10-CM

## 2024-03-08 PROBLEM — R11.2 NAUSEA AND VOMITING: Status: RESOLVED | Noted: 2024-03-07 | Resolved: 2024-03-08

## 2024-03-08 RX ORDER — ALUMINUM HYDROXIDE AND MAGNESIUM CARBONATE 254; 237.5 MG/5ML; MG/5ML
20 LIQUID ORAL 4 TIMES DAILY PRN
Qty: 355 ML | Refills: 11 | Status: SHIPPED | OUTPATIENT
Start: 2024-03-08 | End: 2024-03-08

## 2024-03-08 RX ORDER — RALOXIFENE HYDROCHLORIDE 60 MG/1
60 TABLET, FILM COATED ORAL DAILY
COMMUNITY

## 2024-03-08 RX ORDER — ALUMINUM HYDROXIDE AND MAGNESIUM CARBONATE 254; 237.5 MG/5ML; MG/5ML
20 LIQUID ORAL 4 TIMES DAILY PRN
Qty: 355 ML | Refills: 11 | Status: SHIPPED | OUTPATIENT
Start: 2024-03-08

## 2024-03-08 RX ORDER — SODIUM FLUORIDE 6 MG/ML
PASTE, DENTIFRICE DENTAL
COMMUNITY
Start: 2024-02-20

## 2024-03-08 NOTE — PROGRESS NOTES
Lidia Allen is a 73 y.o. female with PMH of GERD, Osteoporosis who presents with   Chief Complaint   Patient presents with    H Pylori    Heartburn    Nausea    Elevated Hepatic Enzymes       Subjective     # H Pylori Gastritis   # Dyspepsia   # GERD   - Previously reported epigastric pain associated with N/V ongoing for several months which has significantly improved after being treated with Voquezna + Amoxicillin.   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   - Endorses mild persistent abdominal bloating. Believe her new diet may be contributing since she eats a lot of beans.   - Also endorses mild reflux primarily at night but reports lying down within 3 hours of eating.            Past Medical History:   Diagnosis Date    SABAS (acute kidney injury)     2019    Arrhythmia     Diastolic dysfunction     Dizziness     GERD (gastroesophageal reflux disease) In the past.    Much improved    Hyperlipidemia     Hypertension Over a year ago.    Elevated BP disappeared after diet from Dr. Jenkins, [facility]. Vegan/No Oil Diet.    Lactose intolerance Years    Too sweet    Near syncope     Orthostatic hypotension     Osteoporosis     PVC (premature ventricular contraction)     Sleep apnea     wears C-Pap @ bedtime    Stroke        Social History     Socioeconomic History    Marital status:    Tobacco Use    Smoking status: Never    Smokeless tobacco: Never   Vaping Use    Vaping status: Never Used   Substance and Sexual Activity    Alcohol use: Never    Drug use: Never    Sexual activity: Defer         Current Outpatient Medications:     Alum Hydroxide-Mag Carbonate (Gaviscon Extra Strength) 508-475 MG/10ML suspension, Take 20 mL by mouth 4 (Four) Times a Day As Needed (heartburn)., Disp: 355 mL, Rfl: 11    ascorbic acid (VITAMIN C) 250 MG tablet, Take 1 tablet by mouth Daily., Disp: , Rfl:     b complex vitamins capsule, Take 1 capsule by mouth., Disp: , Rfl:      CALCIUM PO, Take 720 mg by mouth Daily., Disp: , Rfl:     cholecalciferol (Vitamin D, Cholecalciferol,) 25 MCG (1000 UT) tablet, Take 5 tablets by mouth Daily., Disp: , Rfl:     NON FORMULARY, Calcium, magnesium algae based supplement, Disp: , Rfl:     Sodium Fluoride 5000 PPM 1.1 % paste, BRUSH ON AT BEDTIME DO NOT EAT OR DRINK FOR AT LEAST 30 MINUTES, Disp: , Rfl:     raloxifene (EVISTA) 60 MG tablet, Take 1 tablet by mouth Daily. Not taking yet pending labs (Patient not taking: Reported on 3/8/2024), Disp: , Rfl:     Objective   Vitals:    03/08/24 1030   BP: 120/80         03/08/24  1030   Weight: 82.8 kg (182 lb 8 oz)     Body mass index is 32.33 kg/m².      Physical Exam  Vitals reviewed.   Constitutional:       Appearance: Normal appearance.   HENT:      Head: Normocephalic and atraumatic.   Eyes:      Extraocular Movements: Extraocular movements intact.      Conjunctiva/sclera: Conjunctivae normal.   Cardiovascular:      Rate and Rhythm: Normal rate and regular rhythm.      Heart sounds: Normal heart sounds.   Pulmonary:      Effort: Pulmonary effort is normal.      Breath sounds: Normal breath sounds.   Abdominal:      General: Abdomen is flat. Bowel sounds are normal. There is no distension.      Palpations: Abdomen is soft.      Tenderness: There is no abdominal tenderness.   Neurological:      Mental Status: She is alert.         WBC   Date Value Ref Range Status   02/17/2024 7.69 3.40 - 10.80 10*3/mm3 Final   11/07/2023 7.23 4.5 - 11.0 10*3/uL Final     RBC   Date Value Ref Range Status   02/17/2024 4.99 3.77 - 5.28 10*6/mm3 Final   11/07/2023 4.71 4.0 - 5.2 10*6/uL Final     Hemoglobin   Date Value Ref Range Status   02/17/2024 14.6 12.0 - 15.9 g/dL Final   11/07/2023 14.6 12.0 - 16.0 g/dL Final     Hematocrit   Date Value Ref Range Status   02/17/2024 45.2 34.0 - 46.6 % Final   11/07/2023 45.0 36.0 - 46.0 % Final     MCV   Date Value Ref Range Status   02/17/2024 90.6 79.0 - 97.0 fL Final   11/07/2023  95.5 80.0 - 100.0 fL Final     MCH   Date Value Ref Range Status   02/17/2024 29.3 26.6 - 33.0 pg Final   11/07/2023 31.0 26.0 - 34.0 pg Final     MCHC   Date Value Ref Range Status   02/17/2024 32.3 31.5 - 35.7 g/dL Final   11/07/2023 32.4 31.0 - 37.0 g/dL Final     RDW   Date Value Ref Range Status   02/17/2024 12.8 12.3 - 15.4 % Final   11/07/2023 12.7 12.0 - 16.8 % Final     RDW-SD   Date Value Ref Range Status   02/17/2024 41.4 37.0 - 54.0 fl Final     MPV   Date Value Ref Range Status   02/17/2024 9.5 6.0 - 12.0 fL Final   11/07/2023 10.5 8.4 - 12.4 fL Final     Platelets   Date Value Ref Range Status   02/17/2024 270 140 - 450 10*3/mm3 Final   11/07/2023 262 140 - 440 10*3/uL Final     Neutrophil Rel %   Date Value Ref Range Status   11/07/2023 55.5 45 - 80 % Final     Neutrophil %   Date Value Ref Range Status   02/17/2024 64.2 42.7 - 76.0 % Final     Lymphocyte Rel %   Date Value Ref Range Status   11/07/2023 28.5 15 - 50 % Final     Lymphocyte %   Date Value Ref Range Status   02/17/2024 26.1 19.6 - 45.3 % Final     Monocyte Rel %   Date Value Ref Range Status   11/07/2023 9.7 0 - 15 % Final     Monocyte %   Date Value Ref Range Status   02/17/2024 6.9 5.0 - 12.0 % Final     Eosinophil %   Date Value Ref Range Status   02/17/2024 2.1 0.3 - 6.2 % Final   11/07/2023 5.1 0 - 7 % Final     Basophil Rel %   Date Value Ref Range Status   11/07/2023 0.6 0 - 2 % Final     Basophil %   Date Value Ref Range Status   02/17/2024 0.4 0.0 - 1.5 % Final     Immature Grans %   Date Value Ref Range Status   02/17/2024 0.3 0.0 - 0.5 % Final   11/07/2023 0.6 0.0 - 1.0 % Final     Neutrophils Absolute   Date Value Ref Range Status   11/07/2023 4.02 2.0 - 8.8 10*3/uL Final     Neutrophils, Absolute   Date Value Ref Range Status   02/17/2024 4.94 1.70 - 7.00 10*3/mm3 Final     Lymphocytes Absolute   Date Value Ref Range Status   11/07/2023 2.06 0.7 - 5.5 10*3/uL Final     Lymphocytes, Absolute   Date Value Ref Range Status    02/17/2024 2.01 0.70 - 3.10 10*3/mm3 Final     Monocytes Absolute   Date Value Ref Range Status   11/07/2023 0.70 0.0 - 1.7 10*3/uL Final     Monocytes, Absolute   Date Value Ref Range Status   02/17/2024 0.53 0.10 - 0.90 10*3/mm3 Final     Eosinophils Absolute   Date Value Ref Range Status   11/07/2023 0.37 0.0 - 0.8 10*3/uL Final     Eosinophils, Absolute   Date Value Ref Range Status   02/17/2024 0.16 0.00 - 0.40 10*3/mm3 Final     Basophils Absolute   Date Value Ref Range Status   11/07/2023 0.04 0.0 - 0.2 10*3/uL Final     Basophils, Absolute   Date Value Ref Range Status   02/17/2024 0.03 0.00 - 0.20 10*3/mm3 Final     Immature Grans, Absolute   Date Value Ref Range Status   02/17/2024 0.02 0.00 - 0.05 10*3/mm3 Final   11/07/2023 0.04 0.00 - 0.10 10*3/uL Final     nRBC   Date Value Ref Range Status   02/17/2024 0.0 0.0 - 0.2 /100 WBC Final       Lab Results   Component Value Date    GLUCOSE 137 (H) 02/17/2024    BUN 21 02/17/2024    CREATININE 0.62 02/17/2024    EGFRIFNONA 91 10/16/2021    EGFRIFAFRI >60 02/08/2022    BCR 33.9 (H) 02/17/2024    CO2 22.9 02/17/2024    CALCIUM 9.1 02/17/2024    ALBUMIN 4.3 02/17/2024    LABIL2 1.5 02/22/2021    AST 22 02/17/2024    ALT 37 (H) 02/17/2024         Imaging Results (Last 7 Days)       ** No results found for the last 168 hours. **              Assessment & Plan   Diagnoses and all orders for this visit:    1. Helicobacter pylori gastritis (Primary)  Assessment & Plan:  - Treated with Voquezna + Amoxicillin   - Obtain H Pylori stool Ag to confirm eradication     Orders:  -     H. Pylori Antigen, Stool - Stool, Per Rectum    2. Dyspepsia  Assessment & Plan:  - Etiology likely due to H Pylori gastritis   - Symptoms significantly improved following treatment for H Pylori       3. Gastroesophageal reflux disease, unspecified whether esophagitis present  Assessment & Plan:  - Start PRN Gaviscon extra strength   - Counseled to avoiding lying down within 3 hours of eating      Orders:  -     Discontinue: Alum Hydroxide-Mag Carbonate (Gaviscon Extra Strength) 508-475 MG/10ML suspension; Take 20 mL by mouth 4 (Four) Times a Day As Needed (heartburn).  Dispense: 355 mL; Refill: 11  -     Alum Hydroxide-Mag Carbonate (Gaviscon Extra Strength) 508-475 MG/10ML suspension; Take 20 mL by mouth 4 (Four) Times a Day As Needed (heartburn).  Dispense: 355 mL; Refill: 11    4. Abdominal bloating  Assessment & Plan:  - Obtain lactulose breath test to evaluate for underlying SIBO  - Declined CACHORRO Cho as she reports her symptoms are mild but persistent     Orders:  -     Breath Hydrogen Test; Future      RTC in 3 months     I have discussed the above plan with the patient.  They verbalize understanding and are in agreement with the plan.  They have been advised to contact the office for any questions, concerns, or changes related to their health.

## 2024-03-08 NOTE — ASSESSMENT & PLAN NOTE
- Start PRN Gaviscon extra strength   - Counseled to avoiding lying down within 3 hours of eating

## 2024-03-08 NOTE — ASSESSMENT & PLAN NOTE
- Obtain lactulose breath test to evaluate for underlying SIBO  - Declined CACHORRO Cho as she reports her symptoms are mild but persistent

## 2024-03-08 NOTE — ASSESSMENT & PLAN NOTE
- Etiology likely due to H Pylori gastritis   - Symptoms significantly improved following treatment for H Pylori

## 2024-03-11 ENCOUNTER — LAB (OUTPATIENT)
Dept: LAB | Facility: HOSPITAL | Age: 74
End: 2024-03-11
Payer: COMMERCIAL

## 2024-03-11 PROCEDURE — 87338 HPYLORI STOOL AG IA: CPT | Performed by: STUDENT IN AN ORGANIZED HEALTH CARE EDUCATION/TRAINING PROGRAM

## 2024-03-12 LAB — H PYLORI AG STL QL IA: NEGATIVE

## 2024-03-13 ENCOUNTER — TRANSCRIBE ORDERS (OUTPATIENT)
Dept: ULTRASOUND IMAGING | Facility: HOSPITAL | Age: 74
End: 2024-03-13
Payer: COMMERCIAL

## 2024-03-13 DIAGNOSIS — M54.2 CERVICALGIA: ICD-10-CM

## 2024-03-13 DIAGNOSIS — E87.1 HYPO-OSMOLALITY AND HYPONATREMIA: Primary | ICD-10-CM

## 2024-05-01 ENCOUNTER — TELEPHONE (OUTPATIENT)
Dept: INTERNAL MEDICINE | Facility: CLINIC | Age: 74
End: 2024-05-01

## 2024-05-01 NOTE — TELEPHONE ENCOUNTER
Caller: Lidia Allen    Relationship: Self    Best call back number: [phone]     What is the best time to reach you: ANY    Who are you requesting to speak with (clinical staff, provider,  specific staff member): ANY      What was the call regarding: PATIENT WOULD LIKE TO KNOW IF HER MEDICAL RECORDS HAVE BEEN RECEIVED FROM HER PREVIOUS PROVIDER     PATIENT STATES THEY WAS FAXED ON 4/29/24    Is it okay if the provider responds through [facility]: YES

## 2024-05-02 NOTE — TELEPHONE ENCOUNTER
Medical records were received from [facility]. Tried to call patient to inform her that we had gotten them but was unable to reach and left a voicemail with the details.

## 2024-05-06 ENCOUNTER — OFFICE VISIT (OUTPATIENT)
Dept: INTERNAL MEDICINE | Facility: CLINIC | Age: 74
End: 2024-05-06
Payer: COMMERCIAL

## 2024-05-06 VITALS
HEIGHT: 63 IN | SYSTOLIC BLOOD PRESSURE: 130 MMHG | OXYGEN SATURATION: 96 % | BODY MASS INDEX: 31.89 KG/M2 | DIASTOLIC BLOOD PRESSURE: 80 MMHG | HEART RATE: 73 BPM | TEMPERATURE: 98.4 F | WEIGHT: 180 LBS

## 2024-05-06 DIAGNOSIS — R42 ORTHOSTATIC LIGHTHEADEDNESS: Primary | ICD-10-CM

## 2024-05-06 DIAGNOSIS — E04.1 THYROID NODULE: ICD-10-CM

## 2024-05-06 DIAGNOSIS — E87.1 HYPONATREMIA: ICD-10-CM

## 2024-05-06 DIAGNOSIS — E55.9 VITAMIN D DEFICIENCY: ICD-10-CM

## 2024-05-06 DIAGNOSIS — Z13.220 SCREENING FOR HYPERLIPIDEMIA: ICD-10-CM

## 2024-05-06 DIAGNOSIS — Z12.31 ENCOUNTER FOR SCREENING MAMMOGRAM FOR MALIGNANT NEOPLASM OF BREAST: ICD-10-CM

## 2024-05-06 DIAGNOSIS — K21.9 GASTROESOPHAGEAL REFLUX DISEASE, UNSPECIFIED WHETHER ESOPHAGITIS PRESENT: ICD-10-CM

## 2024-05-06 PROCEDURE — 99204 OFFICE O/P NEW MOD 45 MIN: CPT | Performed by: INTERNAL MEDICINE

## 2024-05-06 PROCEDURE — 1160F RVW MEDS BY RX/DR IN RCRD: CPT | Performed by: INTERNAL MEDICINE

## 2024-05-06 PROCEDURE — 1159F MED LIST DOCD IN RCRD: CPT | Performed by: INTERNAL MEDICINE

## 2024-05-06 RX ORDER — ALBUTEROL SULFATE 90 UG/1
2 AEROSOL, METERED RESPIRATORY (INHALATION) EVERY 4 HOURS PRN
COMMUNITY
Start: 2024-03-15

## 2024-05-06 RX ORDER — CALCIUM CARBONATE 300MG(750)
1 TABLET,CHEWABLE ORAL DAILY
COMMUNITY

## 2024-05-07 LAB
25(OH)D3+25(OH)D2 SERPL-MCNC: 44 NG/ML (ref 30–100)
ALBUMIN SERPL-MCNC: 4.4 G/DL (ref 3.5–5.2)
ALBUMIN/GLOB SERPL: 1.8 G/DL
ALP SERPL-CCNC: 61 U/L (ref 39–117)
ALT SERPL-CCNC: 19 U/L (ref 1–33)
AST SERPL-CCNC: 18 U/L (ref 1–32)
BASOPHILS # BLD AUTO: 0.03 10*3/MM3 (ref 0–0.2)
BASOPHILS NFR BLD AUTO: 0.4 % (ref 0–1.5)
BILIRUB SERPL-MCNC: 0.4 MG/DL (ref 0–1.2)
BUN SERPL-MCNC: 25 MG/DL (ref 8–23)
BUN/CREAT SERPL: 40.3 (ref 7–25)
CALCIUM SERPL-MCNC: 9.3 MG/DL (ref 8.6–10.5)
CHLORIDE SERPL-SCNC: 104 MMOL/L (ref 98–107)
CHOLEST SERPL-MCNC: 148 MG/DL (ref 0–200)
CO2 SERPL-SCNC: 25.6 MMOL/L (ref 22–29)
CREAT SERPL-MCNC: 0.62 MG/DL (ref 0.57–1)
EGFRCR SERPLBLD CKD-EPI 2021: 94.2 ML/MIN/1.73
EOSINOPHIL # BLD AUTO: 0.08 10*3/MM3 (ref 0–0.4)
EOSINOPHIL NFR BLD AUTO: 1.1 % (ref 0.3–6.2)
ERYTHROCYTE [DISTWIDTH] IN BLOOD BY AUTOMATED COUNT: 12.4 % (ref 12.3–15.4)
GLOBULIN SER CALC-MCNC: 2.4 GM/DL
GLUCOSE SERPL-MCNC: 96 MG/DL (ref 65–99)
HBA1C MFR BLD: 5.6 % (ref 4.8–5.6)
HCT VFR BLD AUTO: 44.5 % (ref 34–46.6)
HDLC SERPL-MCNC: 45 MG/DL (ref 40–60)
HGB BLD-MCNC: 14.9 G/DL (ref 12–15.9)
IMM GRANULOCYTES # BLD AUTO: 0.03 10*3/MM3 (ref 0–0.05)
IMM GRANULOCYTES NFR BLD AUTO: 0.4 % (ref 0–0.5)
LDLC SERPL CALC-MCNC: 83 MG/DL (ref 0–100)
LDLC/HDLC SERPL: 1.8 {RATIO}
LYMPHOCYTES # BLD AUTO: 1.95 10*3/MM3 (ref 0.7–3.1)
LYMPHOCYTES NFR BLD AUTO: 26.5 % (ref 19.6–45.3)
MCH RBC QN AUTO: 31.4 PG (ref 26.6–33)
MCHC RBC AUTO-ENTMCNC: 33.5 G/DL (ref 31.5–35.7)
MCV RBC AUTO: 93.9 FL (ref 79–97)
MONOCYTES # BLD AUTO: 0.58 10*3/MM3 (ref 0.1–0.9)
MONOCYTES NFR BLD AUTO: 7.9 % (ref 5–12)
NEUTROPHILS # BLD AUTO: 4.69 10*3/MM3 (ref 1.7–7)
NEUTROPHILS NFR BLD AUTO: 63.7 % (ref 42.7–76)
NRBC BLD AUTO-RTO: 0 /100 WBC (ref 0–0.2)
PLATELET # BLD AUTO: 223 10*3/MM3 (ref 140–450)
POTASSIUM SERPL-SCNC: 4.3 MMOL/L (ref 3.5–5.2)
PROT SERPL-MCNC: 6.8 G/DL (ref 6–8.5)
RBC # BLD AUTO: 4.74 10*6/MM3 (ref 3.77–5.28)
SODIUM SERPL-SCNC: 142 MMOL/L (ref 136–145)
T4 FREE SERPL-MCNC: 1.08 NG/DL (ref 0.93–1.7)
TRIGL SERPL-MCNC: 109 MG/DL (ref 0–150)
TSH SERPL DL<=0.005 MIU/L-ACNC: 1.86 UIU/ML (ref 0.27–4.2)
VIT B12 SERPL-MCNC: 772 PG/ML (ref 211–946)
VLDLC SERPL CALC-MCNC: 20 MG/DL (ref 5–40)
WBC # BLD AUTO: 7.36 10*3/MM3 (ref 3.4–10.8)

## 2024-05-10 ENCOUNTER — PATIENT ROUNDING (BHMG ONLY) (OUTPATIENT)
Dept: INTERNAL MEDICINE | Facility: CLINIC | Age: 74
End: 2024-05-10
Payer: COMMERCIAL

## 2024-05-14 ENCOUNTER — TELEPHONE (OUTPATIENT)
Dept: INTERNAL MEDICINE | Facility: CLINIC | Age: 74
End: 2024-05-14

## 2024-05-14 NOTE — TELEPHONE ENCOUNTER
Caller: Lidia Allen    Relationship: Self    Best call back number: [phone]     What orders are you requesting (i.e. lab or imaging): ULTRASOUND THYROID    Where will you receive your lab/imaging services: [facility]    Additional notes: PATIENT STATES THAT THE HOSPITAL IS NOT IN THE [facility] SYSTEM, AND THEY NEED HER ORDER TO BE FAXED IN ORDER TO SCHEDULE      FAX: [phone] CYNTHIA WANG

## 2024-05-16 NOTE — TELEPHONE ENCOUNTER
Tried to call and speak with patient but was unable to reach and left a voicemail informing her that I had gotten the order faxed to [facility].

## 2024-06-05 ENCOUNTER — OFFICE VISIT (OUTPATIENT)
Dept: CARDIOLOGY | Facility: CLINIC | Age: 74
End: 2024-06-05
Payer: COMMERCIAL

## 2024-06-05 VITALS
BODY MASS INDEX: 32.48 KG/M2 | HEIGHT: 63 IN | SYSTOLIC BLOOD PRESSURE: 138 MMHG | HEART RATE: 67 BPM | DIASTOLIC BLOOD PRESSURE: 70 MMHG | WEIGHT: 183.3 LBS

## 2024-06-05 DIAGNOSIS — R00.2 PALPITATIONS: Primary | ICD-10-CM

## 2024-06-05 DIAGNOSIS — E22.2 SIADH (SYNDROME OF INAPPROPRIATE ADH PRODUCTION): ICD-10-CM

## 2024-06-05 PROCEDURE — 99214 OFFICE O/P EST MOD 30 MIN: CPT | Performed by: STUDENT IN AN ORGANIZED HEALTH CARE EDUCATION/TRAINING PROGRAM

## 2024-06-05 PROCEDURE — 1159F MED LIST DOCD IN RCRD: CPT | Performed by: STUDENT IN AN ORGANIZED HEALTH CARE EDUCATION/TRAINING PROGRAM

## 2024-06-05 PROCEDURE — 1160F RVW MEDS BY RX/DR IN RCRD: CPT | Performed by: STUDENT IN AN ORGANIZED HEALTH CARE EDUCATION/TRAINING PROGRAM

## 2024-06-05 PROCEDURE — 93000 ELECTROCARDIOGRAM COMPLETE: CPT | Performed by: STUDENT IN AN ORGANIZED HEALTH CARE EDUCATION/TRAINING PROGRAM

## 2024-06-05 RX ORDER — CALCIUM/D3/MAG/K2/MIN/HERB 326 333-32 MG
1 TABLET ORAL
COMMUNITY

## 2024-06-05 NOTE — PATIENT INSTRUCTIONS
Will obtain 1 more heart monitor to make sure that the heavy breathing spells you are having are not due to any heart rhythm problems.    If it looks good, the most we can provide reassurance that your cardiovascular system is working normally not causing any symptoms that you are having.    If the loop recorder bothers you, I would encourage you to reach out to your [place] cardiology team that placed it.  They can remove it for you.  If they are not able to do this, we can try to make arrangements for that but it is always better to have the implanting team remove the device.  Again, we are happy to do it if there are any issues that you run into.    We will schedule follow-up depending what the monitor shows but if it looks good its mostly reassurance we can provide you.  On your exam today, your cardiovascular system appears normal.

## 2024-06-05 NOTE — PROGRESS NOTES
[facility]    Subjective:     Encounter Date:06/05/24      Patient ID: Lidia Allen is a 73 y.o. female.    Chief Complaint: No chief complaint on file.  Dyspnea, palpitations  History of Present Illness    Ms. Allen is a 73-year-old lady past medical history palpitations, GERD, who presents for further evaluation palpitations.  She previously followed with Dr. David at [place].  She is transferring her care to [facility].    She has a history of palpitations and a prior CVA.  Given these she had a loop recorder placed.  She previously was on atenolol but did not tolerate due to low heart rate and fatigue.  She was not interested in trying other beta-blockers.  Diltiazem as needed was discussed but she did not feel this was needed.    Most recent ILR interrogation on August 18 2023 did not reveal any arrhythmias.  The ILR was tried to be transferred to our office but was at [facility].  No further interrogations were able to be done.    She follows with nephrology for SIADH.  He had an echocardiogram performed which revealed normal cardiac pressures.    She continues to have some intermittent episodes of tachypnea which occur randomly at rest.  She reports she hyperventilates.  There are some associated palpitations that occur with it.  She has no chest pain.    I reviewed her previous cardiac testing from [place].:  She underwent an echocardiogram July 29, 2022 which revealed the following:  Normal LVEF, normal RVSP, mild mitral regurgitation,, otherwise unremarkable    Cardiac catheterization April 18, 2019:  Normal coronary arteries, no evidence of any atherosclerosis    The following portions of the patient's history were reviewed and updated as appropriate: allergies, current medications, past family history, past medical history, past social history, past surgical history and problem list.    Past Medical History:   Diagnosis Date    SABAS (acute kidney injury)     2019    Arrhythmia  "    Diastolic dysfunction     Dizziness     GERD (gastroesophageal reflux disease) In the past.    Much improved    Hyperlipidemia     Hypertension Over a year ago.    Elevated BP disappeared after diet from Dr. Jenkins, [facility]. Vegan/No Oil Diet.    Lactose intolerance Years    Too sweet    Near syncope     Orthostatic hypotension     Osteoporosis     PVC (premature ventricular contraction)     Sleep apnea     wears C-Pap @ bedtime    Stroke        Past Surgical History:   Procedure Laterality Date    CARPAL TUNNEL RELEASE             ECG 12 Lead    Date/Time: 6/5/2024 12:37 PM  Performed by: Candelario Parisi MD    Authorized by: Candelario Parisi MD  Comparison: compared with previous ECG from 2/17/2024  Similar to previous ECG  Rhythm: sinus rhythm  Rate: normal  Conduction: conduction normal  ST Segments: ST segments normal  T Waves: T waves normal  QRS axis: normal  Other: no other findings    Clinical impression: normal ECG             Objective:     Vitals:    06/05/24 1055   BP: 138/70   BP Location: Left arm   Pulse: 67   Weight: 83.1 kg (183 lb 4.8 oz)   Height: 160 cm (63\")         Constitutional:       Appearance: Healthy appearance. Not in distress.   Neck:      Vascular: JVD normal.   Pulmonary:      Effort: Pulmonary effort is normal.      Breath sounds: Normal breath sounds.   Cardiovascular:      PMI at left midclavicular line. Normal rate. Regular rhythm. Normal S2.       Murmurs: There is no murmur.      Comments: No signs of overload on exam  Pulses:     Intact distal pulses.   Edema:     Peripheral edema absent.   Skin:     General: Skin is warm and dry.   Neurological:      General: No focal deficit present.      Mental Status: Alert, oriented to person, place, and time and oriented to person, place and time.   Psychiatric:         Mood and Affect: Mood and affect normal.         Lab Review:     Lipid Panel          5/6/2024    11:58   Lipid Panel   Total Cholesterol 148    Triglycerides " 109    HDL Cholesterol 45    VLDL Cholesterol 20    LDL Cholesterol  83    LDL/HDL Ratio 1.80      BUN   Date Value Ref Range Status   05/06/2024 25 (H) 8 - 23 mg/dL Final   02/17/2024 21 8 - 23 mg/dL Final   02/22/2021 15 7 - 20 mg/dL Final     Creatinine   Date Value Ref Range Status   05/06/2024 0.62 0.57 - 1.00 mg/dL Final   02/17/2024 0.62 0.57 - 1.00 mg/dL Final   02/08/2022 0.70 0.6 - 1.3 mg/dL Final     Potassium   Date Value Ref Range Status   05/06/2024 4.3 3.5 - 5.2 mmol/L Final   02/17/2024 4.1 3.5 - 5.2 mmol/L Final   02/22/2021 3.7 3.5 - 5.1 mmol/L Final     ALT (SGPT)   Date Value Ref Range Status   05/06/2024 19 1 - 33 U/L Final   02/17/2024 37 (H) 1 - 33 U/L Final   02/22/2021 25 0 - 35 U/L Final     AST (SGOT)   Date Value Ref Range Status   05/06/2024 18 1 - 32 U/L Final   02/17/2024 22 1 - 32 U/L Final   02/22/2021 27 15 - 46 U/L Final         Performed        Assessment:          Diagnosis Plan   1. Palpitations  Holter Monitor - 72 Hour Up To 15 Days               Plan:         Palpitations: Previously been attributed to PACs.   She wore a 14-day Zio patch in January 2023 given her ongoing complaints and the fact that the loop recorder was not picking up any significant episodes.  Did reveal short, nonsustained atrial tachycardia episodes lasting at most 14 seconds, but these were infrequent.  PAC burden less than 1%.  Patient was reassured.    She continues to have intermittent spells on her ILR is at [facility].  Not able to give us information.    We discussed removing the loop, but is not particular bothering her but if it does I encouraged her to reach back out to our [facility] team that implanted it.  If they are not able to remove it I suppose we can.  But right now she would prefer to leave it in.    I am going to repeat a 2-week Zio patch given her ongoing symptoms of dyspnea and the patient concerns.  If it is unremarkable, then I think I can reassure her and let her know that I do not see any  arrhythmia correlates will be causing her symptoms and no particular cardiac therapies when needed to treat her symptoms.    Dyspnea on exertion: Intermittent, atypical episodes.  She reports they worsened after she had a CPAP malfunction recently.     Echocardiogram was repeated, normal, normal BNP, there is report of impaired relaxation diastolic function but on my evaluation appears her diastolic function is normal.  Regardless, there is no signs of CHF on the echo.  There is no signs of CHF on exam and her BNP was normal.  I do not think her heart is causing this problem.  Reviewed her cardiac catheterization from 2019 which revealed normal coronary arteries.  I do not feel ischemic testing is needed at this time as her symptoms do not sound consistent with angina.      RTC as needed after Zio patch.  If is unremarkable, I will reassure patient that I do not believe that there is a cardiovascular contribution to her symptoms she is having.  If she wants her ILR removed we are happy to do so but encouraged her to discuss this with her [place] team that implanted it.     Candelario Parisi MD  [facility]  06/05/24  10:20 EST       Current Outpatient Medications:     albuterol sulfate  (90 Base) MCG/ACT inhaler, Inhale 2 puffs Every 4 (Four) Hours As Needed., Disp: , Rfl:     ascorbic acid (VITAMIN C) 250 MG tablet, Take 1 tablet by mouth Daily., Disp: , Rfl:     b complex vitamins capsule, Take 1 capsule by mouth., Disp: , Rfl:     CALCIUM PO, Take 720 mg by mouth Daily., Disp: , Rfl:     cholecalciferol (Vitamin D, Cholecalciferol,) 25 MCG (1000 UT) tablet, Take 5 tablets by mouth Daily., Disp: , Rfl:     Magnesium 400 MG tablet, Take 1 tablet by mouth Daily., Disp: , Rfl:     Multiple Vitamins-Minerals (Algae Based Calcium) tablet, Take 1 tablet by mouth., Disp: , Rfl:     NON FORMULARY, Calcium, magnesium algae based supplement, Disp: , Rfl:     raloxifene (EVISTA) 60 MG tablet, Take 1 tablet  by mouth Daily. Not taking yet pending labs, Disp: , Rfl:     Sodium Fluoride 5000 PPM 1.1 % paste, BRUSH ON AT BEDTIME DO NOT EAT OR DRINK FOR AT LEAST 30 MINUTES, Disp: , Rfl:          Return if symptoms worsen or fail to improve.      Part of this note may be an electronic transcription/translation of spoken language to printed text using the Dragon Dictation System.

## 2024-06-12 ENCOUNTER — TELEPHONE (OUTPATIENT)
Dept: INTERNAL MEDICINE | Facility: CLINIC | Age: 74
End: 2024-06-12
Payer: COMMERCIAL

## 2024-06-12 NOTE — TELEPHONE ENCOUNTER
She thinks she may have a blood clot in her leg.  The leg is warm and she is in some pain.  I told her she needs to go to the ER today to get checked out.  She said she would go and she understands.

## 2024-06-14 ENCOUNTER — TELEPHONE (OUTPATIENT)
Dept: INTERNAL MEDICINE | Facility: CLINIC | Age: 74
End: 2024-06-14
Payer: COMMERCIAL

## 2024-06-14 ENCOUNTER — OFFICE VISIT (OUTPATIENT)
Dept: GASTROENTEROLOGY | Facility: CLINIC | Age: 74
End: 2024-06-14
Payer: COMMERCIAL

## 2024-06-14 ENCOUNTER — HOSPITAL ENCOUNTER (OUTPATIENT)
Dept: CARDIOLOGY | Facility: HOSPITAL | Age: 74
Discharge: HOME | End: 2024-06-14
Payer: COMMERCIAL

## 2024-06-14 ENCOUNTER — TELEPHONE (OUTPATIENT)
Dept: GASTROENTEROLOGY | Facility: CLINIC | Age: 74
End: 2024-06-14

## 2024-06-14 VITALS
HEIGHT: 63 IN | DIASTOLIC BLOOD PRESSURE: 82 MMHG | SYSTOLIC BLOOD PRESSURE: 130 MMHG | WEIGHT: 183 LBS | BODY MASS INDEX: 32.43 KG/M2

## 2024-06-14 DIAGNOSIS — R10.13 DYSPEPSIA: ICD-10-CM

## 2024-06-14 DIAGNOSIS — K21.9 GASTROESOPHAGEAL REFLUX DISEASE, UNSPECIFIED WHETHER ESOPHAGITIS PRESENT: ICD-10-CM

## 2024-06-14 DIAGNOSIS — R00.2 PALPITATIONS: ICD-10-CM

## 2024-06-14 DIAGNOSIS — K58.0 IRRITABLE BOWEL SYNDROME WITH DIARRHEA: ICD-10-CM

## 2024-06-14 DIAGNOSIS — R14.0 ABDOMINAL BLOATING: Primary | ICD-10-CM

## 2024-06-14 PROCEDURE — 93246 EXT ECG>7D<15D RECORDING: CPT

## 2024-06-14 NOTE — ASSESSMENT & PLAN NOTE
- Suspect etiology is due to IBS-D. Start Rifaximin 550 mg TID   - Declined PRN Mylicon previously

## 2024-06-14 NOTE — TELEPHONE ENCOUNTER
Caller: Lidia Allen    Relationship to patient: Self    Best call back number: [phone]    Chief complaint: FOLLOW UP ON POSSIBLE BLOOD CLOT     Type of visit: HOSPITAL FOLLOW UP     Requested date: SEEN AT [facility] EMERGENCY ROOM ON  06/12/24    If rescheduling, when is the original appointment: N/A     Additional notes: PATIENT CALLED AND STATED THAT SHE NEEDS A HOSPITAL FOLLOW UP, HUB UNABLE TO WARM TRANSFER

## 2024-06-14 NOTE — PROGRESS NOTES
Lidia Allen is a 73 y.o. female with PMH of H Pylori Gastritis, GERD, Osteoporosis who presents with   Chief Complaint   Patient presents with    H Pylori    Dyspepsia    Heartburn    Bloated       Subjective     # H Pylori Gastritis   # Dyspepsia   # GERD   # Abdominal Bloating  - Previously treated with Voquezna + Amoxicillin for H Pylori gastritis. Had negative H Pylori stool Ag which confirmed eradication. Dyspepsia resolved following treatment for H Pylori.   - Continues to endorses mild persistent abdominal bloating. Reports intermittent loose stool once per week. Believes her diet may be contributing since she eats a lot of beans.   - Reports improvement in reflux through no longer eating within 3 hours of lying down for bed. She reports did not want to try Gaviscon since it contained aluminum.    - Unable to do Lactulose breath test due to the diet that was required .  Declined starting PRN Mylicon at last appointment.             Past Medical History:   Diagnosis Date    SABAS (acute kidney injury)     2019    Arrhythmia     Diastolic dysfunction     Dizziness     GERD (gastroesophageal reflux disease) In the past.    Much improved    Hyperlipidemia     Hypertension Over a year ago.    Elevated BP disappeared after diet from Dr. Jenkins, [facility]. Vegan/No Oil Diet.    Lactose intolerance Years    Too sweet    Near syncope     Orthostatic hypotension     Osteoporosis     PVC (premature ventricular contraction)     Sleep apnea     wears C-Pap @ bedtime    Stroke        Social History     Socioeconomic History    Marital status:    Tobacco Use    Smoking status: Never    Smokeless tobacco: Never   Vaping Use    Vaping status: Never Used   Substance and Sexual Activity    Alcohol use: Never    Drug use: Never    Sexual activity: Defer         Current Outpatient Medications:     albuterol sulfate  (90 Base) MCG/ACT inhaler, Inhale 2 puffs Every 4 (Four) Hours As Needed., Disp: , Rfl:      ascorbic acid (VITAMIN C) 250 MG tablet, Take 1 tablet by mouth Daily., Disp: , Rfl:     b complex vitamins capsule, Take 1 capsule by mouth., Disp: , Rfl:     CALCIUM PO, Take 720 mg by mouth Daily., Disp: , Rfl:     cholecalciferol (Vitamin D, Cholecalciferol,) 25 MCG (1000 UT) tablet, Take 5 tablets by mouth Daily., Disp: , Rfl:     Magnesium 400 MG tablet, Take 1 tablet by mouth Daily., Disp: , Rfl:     Multiple Vitamins-Minerals (Algae Based Calcium) tablet, Take 1 tablet by mouth., Disp: , Rfl:     NON FORMULARY, Calcium, magnesium algae based supplement, Disp: , Rfl:     raloxifene (EVISTA) 60 MG tablet, Take 1 tablet by mouth Daily. Not taking yet pending labs, Disp: , Rfl:     riFAXIMin (XIFAXAN) 550 MG tablet, Take 1 tablet by mouth Every 8 (Eight) Hours for 14 days., Disp: 42 tablet, Rfl: 0    Sodium Fluoride 5000 PPM 1.1 % paste, BRUSH ON AT BEDTIME DO NOT EAT OR DRINK FOR AT LEAST 30 MINUTES, Disp: , Rfl:     Objective   Vitals:    06/14/24 0952   BP: 130/82         06/14/24 0952   Weight: 83 kg (183 lb)     Body mass index is 32.42 kg/m².      Physical Exam  Vitals reviewed.   Constitutional:       Appearance: Normal appearance.   HENT:      Head: Normocephalic and atraumatic.   Eyes:      Extraocular Movements: Extraocular movements intact.      Conjunctiva/sclera: Conjunctivae normal.   Cardiovascular:      Rate and Rhythm: Normal rate and regular rhythm.      Heart sounds: Normal heart sounds.   Pulmonary:      Effort: Pulmonary effort is normal.      Breath sounds: Normal breath sounds.   Abdominal:      General: Abdomen is flat. Bowel sounds are normal. There is no distension.      Palpations: Abdomen is soft.      Tenderness: There is no abdominal tenderness.   Neurological:      Mental Status: She is alert.   Psychiatric:         Mood and Affect: Mood normal.         Behavior: Behavior normal.         WBC   Date Value Ref Range Status   06/12/2024 8.64 4.5 - 11.0 10*3/uL Final     RBC    Date Value Ref Range Status   06/12/2024 4.74 4.0 - 5.2 10*6/uL Final     Hemoglobin   Date Value Ref Range Status   06/12/2024 14.6 12.0 - 16.0 g/dL Final     Hematocrit   Date Value Ref Range Status   06/12/2024 43.9 36.0 - 46.0 % Final     MCV   Date Value Ref Range Status   06/12/2024 92.6 80.0 - 100.0 fL Final     MCH   Date Value Ref Range Status   06/12/2024 30.8 26.0 - 34.0 pg Final     MCHC   Date Value Ref Range Status   06/12/2024 33.3 31.0 - 37.0 g/dL Final     RDW   Date Value Ref Range Status   06/12/2024 12.6 12.0 - 16.8 % Final     RDW-SD   Date Value Ref Range Status   02/17/2024 41.4 37.0 - 54.0 fl Final     MPV   Date Value Ref Range Status   06/12/2024 10.0 8.4 - 12.4 fL Final     Platelets   Date Value Ref Range Status   06/12/2024 219 140 - 440 10*3/uL Final     Neutrophil Rel %   Date Value Ref Range Status   06/12/2024 60.6 45 - 80 % Final     Lymphocyte Rel %   Date Value Ref Range Status   06/12/2024 28.4 15 - 50 % Final     Monocyte Rel %   Date Value Ref Range Status   06/12/2024 5.7 0 - 15 % Final     Eosinophil %   Date Value Ref Range Status   06/12/2024 4.1 0 - 7 % Final     Basophil Rel %   Date Value Ref Range Status   06/12/2024 0.7 0 - 2 % Final     Immature Grans %   Date Value Ref Range Status   06/12/2024 0.5 0.0 - 1.0 % Final     Neutrophils Absolute   Date Value Ref Range Status   06/12/2024 5.25 2.0 - 8.8 10*3/uL Final     Lymphocytes Absolute   Date Value Ref Range Status   06/12/2024 2.45 0.7 - 5.5 10*3/uL Final     Monocytes Absolute   Date Value Ref Range Status   06/12/2024 0.49 0.0 - 1.7 10*3/uL Final     Eosinophils Absolute   Date Value Ref Range Status   06/12/2024 0.35 0.0 - 0.8 10*3/uL Final     Basophils Absolute   Date Value Ref Range Status   06/12/2024 0.06 0.0 - 0.2 10*3/uL Final     Immature Grans, Absolute   Date Value Ref Range Status   06/12/2024 0.04 0.00 - 0.10 10*3/uL Final     nRBC   Date Value Ref Range Status   06/12/2024 0 0 /100(WBC) Final    02/17/2024 0.0 0.0 - 0.2 /100 WBC Final       Lab Results   Component Value Date    GLUCOSE 96 05/06/2024    BUN 25 (H) 05/06/2024    CREATININE 0.62 05/06/2024    EGFRIFNONA 91 10/16/2021    EGFRIFAFRI >60 02/08/2022    BCR 40.3 (H) 05/06/2024    CO2 25.6 05/06/2024    CALCIUM 9.3 05/06/2024    PROTENTOTREF 6.8 05/06/2024    ALBUMIN 4.4 05/06/2024    LABIL2 1.8 05/06/2024    AST 18 05/06/2024    ALT 19 05/06/2024         Imaging Results (Last 7 Days)       ** No results found for the last 168 hours. **              Assessment & Plan   Diagnoses and all orders for this visit:    1. Abdominal bloating (Primary)  Assessment & Plan:  - Suspect etiology is due to IBS-D. Start Rifaximin 550 mg TID   - Declined PRN Mylicon previously       2. Irritable bowel syndrome with diarrhea  Assessment & Plan:  - Start 2 week course of Rifaximin 550 mg TID     Orders:  -     Discontinue: riFAXIMin (XIFAXAN) 550 MG tablet; Take 1 tablet by mouth Every 8 (Eight) Hours for 14 days.  Dispense: 42 tablet; Refill: 0  -     riFAXIMin (XIFAXAN) 550 MG tablet; Take 1 tablet by mouth Every 8 (Eight) Hours for 14 days.  Dispense: 42 tablet; Refill: 0    3. Dyspepsia  Assessment & Plan:  - Controlled following treatment of H Pylori gastritis       4. Gastroesophageal reflux disease, unspecified whether esophagitis present  Assessment & Plan:  - Declined Gaviscon extra strength given aluminum it contains    - Improved with GERD precautions         RTC in 3 months     I have discussed the above plan with the patient.  They verbalize understanding and are in agreement with the plan.  They have been advised to contact the office for any questions, concerns, or changes related to their health.

## 2024-06-14 NOTE — TELEPHONE ENCOUNTER
Ongoing SW/CM Assessment/Plan of Care Note     See SW/CM flowsheets for goals and other objective data.    Patient/Family discharge goal (s):  Goal #1: Psychosocial needs assessed  Goal #2: Home Care arranged or issues addressed       PT Recommendation:     Recommendation for Discharge: PT IL: Patient requires  intermittent assistance to perform mobility and/or ADLs safely    OT Recommendation:     Recommendations for Discharge: OT IL: Patient requires  intermittent assistance to perform mobility and/or ADLs safely, Patient is appropriate for Occupational Therapy 1-3 times per week    SLP Recommendation:       Disposition:  Planned Discharge Destination: Home/apartment with Services/Support    Progress note:   ANN had MD complete page 2/2 on [facility] wound vac form.   Updated referral sent to Anish & DAVIDA via all-scripts.   CM spoke w/PT therapy/wound: plan change vac today.  anticipant discharge home by weekend??.   CM spoke w/Anish: DAVIDA can deliver to hospital Friday Jan 6th.    Pt has PCP appointment scheduled 1 week from now (1/11).   Needs RX for glucometer or from diabetic educator(on consult team)    Per Anish/DAVIDA, referral for Wound vac accepted by [facility] and can deliver to hospital room on Friday 1/6/2023         PA has been approved from 6/14/2024-8/9/2024. PA approval has been scanned into media.

## 2024-06-14 NOTE — ASSESSMENT & PLAN NOTE
- Declined Gaviscon extra strength given aluminum it contains    - Improved with GERD precautions

## 2024-06-24 ENCOUNTER — OFFICE VISIT (OUTPATIENT)
Dept: INTERNAL MEDICINE | Facility: CLINIC | Age: 74
End: 2024-06-24
Payer: COMMERCIAL

## 2024-06-24 VITALS
DIASTOLIC BLOOD PRESSURE: 78 MMHG | HEIGHT: 63 IN | BODY MASS INDEX: 32.71 KG/M2 | SYSTOLIC BLOOD PRESSURE: 124 MMHG | WEIGHT: 184.6 LBS | HEART RATE: 75 BPM | OXYGEN SATURATION: 96 % | TEMPERATURE: 98.6 F

## 2024-06-24 DIAGNOSIS — R42 DIZZINESS: Primary | ICD-10-CM

## 2024-06-24 DIAGNOSIS — E86.0 DEHYDRATION: ICD-10-CM

## 2024-06-24 PROCEDURE — 99214 OFFICE O/P EST MOD 30 MIN: CPT | Performed by: INTERNAL MEDICINE

## 2024-06-24 PROCEDURE — 1159F MED LIST DOCD IN RCRD: CPT | Performed by: INTERNAL MEDICINE

## 2024-06-24 PROCEDURE — 1160F RVW MEDS BY RX/DR IN RCRD: CPT | Performed by: INTERNAL MEDICINE

## 2024-06-24 NOTE — PROGRESS NOTES
Lidia Allen is a 73 y.o. female who presents with a chief complaint of   Chief Complaint   Patient presents with    Hospital Follow Up Visit     Visit from 6/12       \[facility] [place] [facility]\""     ED asked her to go to vascular surgeon for her varicose vein in her right lower leg.     Dizziness daily that is bothering her and making it difficult to complete daily tasks.       The following portions of the patient's history were reviewed and updated as appropriate: allergies, current medications, past family history, past medical history, past social history, past surgical history and problem list.      Current Outpatient Medications:     albuterol sulfate  (90 Base) MCG/ACT inhaler, Inhale 2 puffs Every 4 (Four) Hours As Needed., Disp: , Rfl:     ascorbic acid (VITAMIN C) 250 MG tablet, Take 1 tablet by mouth Daily., Disp: , Rfl:     b complex vitamins capsule, Take 1 capsule by mouth., Disp: , Rfl:     CALCIUM PO, Take 720 mg by mouth Daily., Disp: , Rfl:     cholecalciferol (Vitamin D, Cholecalciferol,) 25 MCG (1000 UT) tablet, Take 5 tablets by mouth Daily., Disp: , Rfl:     Magnesium 400 MG tablet, Take 1 tablet by mouth Daily., Disp: , Rfl:     NON FORMULARY, Calcium, magnesium algae based supplement, Disp: , Rfl:     raloxifene (EVISTA) 60 MG tablet, Take 1 tablet by mouth Daily. Not taking yet pending labs, Disp: , Rfl:     Sodium Fluoride 5000 PPM 1.1 % paste, BRUSH ON AT BEDTIME DO NOT EAT OR DRINK FOR AT LEAST 30 MINUTES, Disp: , Rfl:     Multiple Vitamins-Minerals (Algae Based Calcium) tablet, Take 1 tablet by mouth. (Patient not taking: Reported on 6/24/2024), Disp: , Rfl:     riFAXIMin (XIFAXAN) 550 MG tablet, Take 1 tablet by mouth Every 8 (Eight) Hours for 14 days. (Patient not taking: Reported on 6/24/2024), Disp: 42 tablet, Rfl: 0            Physical Exam  /78 (BP Location: Left arm, Patient Position: Sitting, Cuff Size: Large Adult)   Pulse 75   Temp 98.6 °F (37 °C) (Infrared)   Ht 160 cm  "(63\")   Wt 83.7 kg (184 lb 9.6 oz)   SpO2 96%   BMI 32.70 kg/m²     Physical Exam  Vitals reviewed.   Constitutional:       General: She is not in acute distress.     Appearance: Normal appearance.   HENT:      Head: Normocephalic and atraumatic.      Nose: Nose normal.      Mouth/Throat:      Mouth: Mucous membranes are moist.   Eyes:      Conjunctiva/sclera: Conjunctivae normal.   Pulmonary:      Effort: Pulmonary effort is normal.   Skin:     General: Skin is warm and dry.   Neurological:      General: No focal deficit present.      Mental Status: She is alert.   Psychiatric:         Mood and Affect: Mood normal.           Results for orders placed or performed in visit on 05/06/24   Comprehensive Metabolic Panel    Specimen: Blood   Result Value Ref Range    Glucose 96 65 - 99 mg/dL    BUN 25 (H) 8 - 23 mg/dL    Creatinine 0.62 0.57 - 1.00 mg/dL    EGFR Result 94.2 >60.0 mL/min/1.73    BUN/Creatinine Ratio 40.3 (H) 7.0 - 25.0    Sodium 142 136 - 145 mmol/L    Potassium 4.3 3.5 - 5.2 mmol/L    Chloride 104 98 - 107 mmol/L    Total CO2 25.6 22.0 - 29.0 mmol/L    Calcium 9.3 8.6 - 10.5 mg/dL    Total Protein 6.8 6.0 - 8.5 g/dL    Albumin 4.4 3.5 - 5.2 g/dL    Globulin 2.4 gm/dL    A/G Ratio 1.8 g/dL    Total Bilirubin 0.4 0.0 - 1.2 mg/dL    Alkaline Phosphatase 61 39 - 117 U/L    AST (SGOT) 18 1 - 32 U/L    ALT (SGPT) 19 1 - 33 U/L   Lipid Panel With LDL / HDL Ratio    Specimen: Blood   Result Value Ref Range    Total Cholesterol 148 0 - 200 mg/dL    Triglycerides 109 0 - 150 mg/dL    HDL Cholesterol 45 40 - 60 mg/dL    VLDL Cholesterol Rupert 20 5 - 40 mg/dL    LDL Chol Calc (NIH) 83 0 - 100 mg/dL    LDL/HDL RATIO 1.80    Hemoglobin A1c    Specimen: Blood   Result Value Ref Range    Hemoglobin A1C 5.60 4.80 - 5.60 %   T4, Free    Specimen: Blood   Result Value Ref Range    Free T4 1.08 0.93 - 1.70 ng/dL   TSH    Specimen: Blood   Result Value Ref Range    TSH 1.860 0.270 - 4.200 uIU/mL   Vitamin B12    Specimen: " Blood   Result Value Ref Range    Vitamin B-12 772 211 - 946 pg/mL   Vitamin D,25-Hydroxy    Specimen: Blood   Result Value Ref Range    25 Hydroxy, Vitamin D 44.0 30.0 - 100.0 ng/ml   CBC & Differential    Specimen: Blood   Result Value Ref Range    WBC 7.36 3.40 - 10.80 10*3/mm3    RBC 4.74 3.77 - 5.28 10*6/mm3    Hemoglobin 14.9 12.0 - 15.9 g/dL    Hematocrit 44.5 34.0 - 46.6 %    MCV 93.9 79.0 - 97.0 fL    MCH 31.4 26.6 - 33.0 pg    MCHC 33.5 31.5 - 35.7 g/dL    RDW 12.4 12.3 - 15.4 %    Platelets 223 140 - 450 10*3/mm3    Neutrophil Rel % 63.7 42.7 - 76.0 %    Lymphocyte Rel % 26.5 19.6 - 45.3 %    Monocyte Rel % 7.9 5.0 - 12.0 %    Eosinophil Rel % 1.1 0.3 - 6.2 %    Basophil Rel % 0.4 0.0 - 1.5 %    Neutrophils Absolute 4.69 1.70 - 7.00 10*3/mm3    Lymphocytes Absolute 1.95 0.70 - 3.10 10*3/mm3    Monocytes Absolute 0.58 0.10 - 0.90 10*3/mm3    Eosinophils Absolute 0.08 0.00 - 0.40 10*3/mm3    Basophils Absolute 0.03 0.00 - 0.20 10*3/mm3    Immature Granulocyte Rel % 0.4 0.0 - 0.5 %    Immature Grans Absolute 0.03 0.00 - 0.05 10*3/mm3    nRBC 0.0 0.0 - 0.2 /100 WBC           Diagnoses and all orders for this visit:    1. Dizziness (Primary)    2. Dehydration      Advised Epley and slow increase of fluids.  Reviewed ED note from [facility] and imaging results as well.

## 2024-07-05 ENCOUNTER — TELEPHONE (OUTPATIENT)
Dept: CARDIOLOGY | Facility: CLINIC | Age: 74
End: 2024-07-05
Payer: COMMERCIAL

## 2024-07-05 NOTE — TELEPHONE ENCOUNTER
Called and left VM. Will continue to try to reach patient. HUB transfer call to triage.     Elizabeth Cash RN  Triage [facility] [place]

## 2024-07-05 NOTE — TELEPHONE ENCOUNTER
"----- Message from Candelario Parisi sent at 7/5/2024  9:10 AM EDT -----  Good please call patient and let her know that her heart monitor that she wore was consistent with previous Holter's.  There were short episodes of symptom we called \"nonsustained\" SVT.  It does not appear that these correlated with any symptoms she was having.  There is another episode of likely \"atrial tachycardia\" that had been noted on her previous loop recorder as well, this was also noted on this monitor.  It apparently lasted 27 seconds, but did not correlate with any symptoms.    In all, I do not see any changes to this monitor when compared to previous and since it does not appear that these are correlated with any symptoms I would not recommend any changes to her therapy.  I do think that it might be worthwhile for her to come back in 1 year for a symptom recheck.  Thank you for the help.  "

## 2024-07-08 NOTE — TELEPHONE ENCOUNTER
Notified patient of results/recommendations. Patient verbalized understanding. Fu scheduled.     Elizabeth Cash RN  Triage [facility] [place]

## 2024-07-15 ENCOUNTER — OFFICE VISIT (OUTPATIENT)
Dept: INTERNAL MEDICINE | Facility: CLINIC | Age: 74
End: 2024-07-15
Payer: COMMERCIAL

## 2024-07-15 VITALS
HEIGHT: 63 IN | SYSTOLIC BLOOD PRESSURE: 122 MMHG | WEIGHT: 182.6 LBS | BODY MASS INDEX: 32.36 KG/M2 | HEART RATE: 74 BPM | DIASTOLIC BLOOD PRESSURE: 78 MMHG | TEMPERATURE: 97.7 F | OXYGEN SATURATION: 95 %

## 2024-07-15 DIAGNOSIS — Z00.00 MEDICARE ANNUAL WELLNESS VISIT, SUBSEQUENT: Primary | ICD-10-CM

## 2024-07-15 PROCEDURE — G0439 PPPS, SUBSEQ VISIT: HCPCS | Performed by: INTERNAL MEDICINE

## 2024-07-15 PROCEDURE — 1159F MED LIST DOCD IN RCRD: CPT | Performed by: INTERNAL MEDICINE

## 2024-07-15 PROCEDURE — 1160F RVW MEDS BY RX/DR IN RCRD: CPT | Performed by: INTERNAL MEDICINE

## 2024-07-15 PROCEDURE — 1170F FXNL STATUS ASSESSED: CPT | Performed by: INTERNAL MEDICINE

## 2024-07-15 NOTE — PROGRESS NOTES
The ABCs of the Annual Wellness Visit  Subsequent Medicare Wellness Visit    Chief Complaint   Patient presents with    Medicare Wellness-subsequent      Subjective    History of Present Illness:  Lidia Allen is a 73 y.o. female who presents for a Subsequent Medicare Wellness Visit.    The following portions of the patient's history were reviewed and   updated as appropriate: allergies, current medications, past family history, past medical history, past social history, past surgical history, and problem list.    Compared to one year ago, the patient feels her physical   health is better.    Compared to one year ago, the patient feels her mental   health is worse.  Feels short term memory is worse, but long term memory is okay.     Recent Hospitalizations:  She was not admitted to the hospital during the last year.       Current Medical Providers:  Patient Care Team:  Keshia Perry MD as PCP - General (Internal Medicine)  Rocky Tapia MD as Consulting Physician (Gastroenterology)  Candelario Parisi MD as Consulting Physician (Cardiology)    Outpatient Medications Prior to Visit   Medication Sig Dispense Refill    albuterol sulfate  (90 Base) MCG/ACT inhaler Inhale 2 puffs Every 4 (Four) Hours As Needed.      ascorbic acid (VITAMIN C) 250 MG tablet Take 1 tablet by mouth Daily.      b complex vitamins capsule Take 1 capsule by mouth.      CALCIUM PO Take 720 mg by mouth Daily.      cholecalciferol (Vitamin D, Cholecalciferol,) 25 MCG (1000 UT) tablet Take 5 tablets by mouth Daily.      Magnesium 400 MG tablet Take 1 tablet by mouth Daily.      NON FORMULARY Calcium, magnesium algae based supplement      raloxifene (EVISTA) 60 MG tablet Take 1 tablet by mouth Daily. Not taking yet pending labs      Sodium Fluoride 5000 PPM 1.1 % paste BRUSH ON AT BEDTIME DO NOT EAT OR DRINK FOR AT LEAST 30 MINUTES       No facility-administered medications prior to visit.       No opioid medication identified on  "active medication list. I have reviewed chart for other potential  high risk medication/s and harmful drug interactions in the elderly.        Aspirin is not on active medication list.   ASA indicated due to history of emboli stroke, but patient concerned about side effects.  Declines ASA and statin today .    Patient Active Problem List   Diagnosis    Palpitations    Orthostatic lightheadedness    Gastroesophageal reflux disease with esophagitis without hemorrhage    Well woman exam with routine gynecological exam    Helicobacter pylori gastritis    Dyspepsia    Gastroesophageal reflux disease    Abdominal bloating    Irritable bowel syndrome with diarrhea     Advance Care Planning  Advance Directive is on file.  ACP discussion was held with the patient during this visit. Patient has an advance directive in EMR which is still valid.           Objective    Vitals:    07/15/24 1040   BP: 122/78   BP Location: Left arm   Patient Position: Sitting   Cuff Size: Adult   Pulse: 74   Temp: 97.7 °F (36.5 °C)   TempSrc: Infrared   SpO2: 95%   Weight: 82.8 kg (182 lb 9.6 oz)   Height: 160 cm (63\")     Estimated body mass index is 32.35 kg/m² as calculated from the following:    Height as of this encounter: 160 cm (63\").    Weight as of this encounter: 82.8 kg (182 lb 9.6 oz).           Does the patient have evidence of cognitive impairment? Yes.  Patient feels her short term memory has decreased.     Physical Exam  Vitals reviewed.   Constitutional:       General: She is not in acute distress.     Appearance: Normal appearance.   HENT:      Head: Normocephalic and atraumatic.      Nose: Nose normal.      Mouth/Throat:      Mouth: Mucous membranes are moist.   Eyes:      Conjunctiva/sclera: Conjunctivae normal.   Cardiovascular:      Rate and Rhythm: Normal rate and regular rhythm.   Pulmonary:      Effort: Pulmonary effort is normal.      Breath sounds: Normal breath sounds.   Abdominal:      Comments: Diffuse mild tenderness " similar to prior exam   Skin:     General: Skin is warm and dry.   Neurological:      General: No focal deficit present.      Mental Status: She is alert.   Psychiatric:         Mood and Affect: Mood normal.       Lab Results   Component Value Date    CHLPL 148 2024    TRIG 109 2024    HDL 45 2024    LDL 83 2024    VLDL 20 2024    HGBA1C 5.60 2024            HEALTH RISK ASSESSMENT    Smoking Status:  Social History     Tobacco Use   Smoking Status Never    Passive exposure: Never   Smokeless Tobacco Never     Alcohol Consumption:  Social History     Substance and Sexual Activity   Alcohol Use Never     Fall Risk Screen:    ZAC Fall Risk Assessment was completed, and patient is at LOW risk for falls.Assessment completed on:7/15/2024    Depression Screenin/15/2024    10:48 AM   PHQ-2/PHQ-9 Depression Screening   Little Interest or Pleasure in Doing Things 0-->not at all   Feeling Down, Depressed or Hopeless 0-->not at all   PHQ-9: Brief Depression Severity Measure Score 0       Health Habits and Functional and Cognitive Screenin/15/2024     8:18 AM   Functional & Cognitive Status   Do you have difficulty preparing food and eating? No   Do you have difficulty bathing yourself, getting dressed or grooming yourself? No   Do you have difficulty using the toilet? No   Do you have difficulty moving around from place to place? No   Do you have trouble with steps or getting out of a bed or a chair? No   Current Diet Well Balanced Diet   Dental Exam Up to date   Eye Exam Up to date   Exercise (times per week) 7 times per week   Current Exercises Include Walking   Do you need help using the phone?  No   Are you deaf or do you have serious difficulty hearing?  No   Do you need help to go to places out of walking distance? No   Do you need help shopping? No   Do you need help preparing meals?  No   Do you need help with housework?  No   Do you need help with laundry? No    Do you need help taking your medications? No   Do you need help managing money? No   Do you ever drive or ride in a car without wearing a seat belt? No   Have you felt unusual stress, anger or loneliness in the last month? No   Who do you live with? Child   If you need help, do you have trouble finding someone available to you? No   Have you been bothered in the last four weeks by sexual problems? No   Do you have difficulty concentrating, remembering or making decisions? No       Age-appropriate Screening Schedule:  Refer to the list below for future screening recommendations based on patient's age, sex and/or medical conditions. Orders for these recommended tests are listed in the plan section. The patient has been provided with a written plan.    Health Maintenance   Topic Date Due    COLORECTAL CANCER SCREENING  Never done    Pneumococcal Vaccine 65+ (1 of 2 - PCV) Never done    TDAP/TD VACCINES (1 - Tdap) Never done    ZOSTER VACCINE (1 of 2) Never done    HEPATITIS C SCREENING  Never done    COVID-19 Vaccine (5 - 2023-24 season) 09/01/2023    INFLUENZA VACCINE  08/01/2024    LIPID PANEL  05/06/2025    BMI FOLLOWUP  05/06/2025    DXA SCAN  06/26/2025    ANNUAL WELLNESS VISIT  07/15/2025    MAMMOGRAM  05/29/2026              Assessment & Plan   CMS Preventative Services Quick Reference  Risk Factors Identified During Encounter  Discussed regular activity, continuing good dietary changes that have reduced her cholesterol.  Discussed pros of ASA and statin to prevent stroke.  She still prefers to not be on these at this time.  I attached informational sheets on both drugs.     She has had a colonoscopy within the last 10 years.  She cannot remember the doctor's name or the practice.     She will think about TDAP and Prevnar.     The above risks/problems have been discussed with the patient.  Follow up actions/plans if indicated are seen below in the Assessment/Plan Section.  Pertinent information has been shared  with the patient in the After Visit Summary.    Diagnoses and all orders for this visit:    1. Medicare annual wellness visit, subsequent (Primary)        Follow Up:   Return in about 6 months (around 1/15/2025) for f/u hyponatremia, vaccines.     An After Visit Summary and PPPS were made available to the patient.

## 2024-08-27 ENCOUNTER — OFFICE VISIT (OUTPATIENT)
Dept: CARDIOLOGY | Facility: CLINIC | Age: 74
End: 2024-08-27
Payer: COMMERCIAL

## 2024-08-27 VITALS
OXYGEN SATURATION: 97 % | BODY MASS INDEX: 31.95 KG/M2 | HEART RATE: 76 BPM | DIASTOLIC BLOOD PRESSURE: 64 MMHG | WEIGHT: 180.3 LBS | HEIGHT: 63 IN | SYSTOLIC BLOOD PRESSURE: 116 MMHG

## 2024-08-27 DIAGNOSIS — R00.2 PALPITATIONS: Primary | ICD-10-CM

## 2024-08-27 DIAGNOSIS — K21.9 GASTROESOPHAGEAL REFLUX DISEASE, UNSPECIFIED WHETHER ESOPHAGITIS PRESENT: ICD-10-CM

## 2024-08-27 DIAGNOSIS — I47.19 ATRIAL TACHYCARDIA: ICD-10-CM

## 2024-08-27 DIAGNOSIS — R42 ORTHOSTATIC LIGHTHEADEDNESS: ICD-10-CM

## 2024-08-27 RX ORDER — CHLORHEXIDINE GLUCONATE ORAL RINSE 1.2 MG/ML
15 SOLUTION DENTAL 2 TIMES DAILY
COMMUNITY
Start: 2024-08-26

## 2024-08-27 NOTE — PROGRESS NOTES
[facility]    Subjective:     Encounter Date:08/27/24      Patient ID: Lidia Allen is a 74 y.o. female.    Chief Complaint:   Chief Complaint   Patient presents with    Follow-up     Wants to discuss loop recorder removal   Dyspnea, palpitations  History of Present Illness    Ms. Allen is a 74-year-old lady past medical history palpitations, GERD, who presents for further evaluation palpitations.  She previously followed with Dr. David at [place].  She is transferring her care to [facility].    She has a history of palpitations and a prior CVA.  Given these she had a loop recorder placed.  She previously was on atenolol but did not tolerate due to low heart rate and fatigue.  She was not interested in trying other beta-blockers.  Diltiazem as needed was discussed but she did not feel this was needed.    Most recent ILR interrogation on August 18 2023 did not reveal any arrhythmias.  The ILR was tried to be transferred to our office but was at [facility].  No further interrogations were able to be done.    She follows with nephrology for SIADH.  He had an echocardiogram performed which revealed normal cardiac pressures.    She continues to have some intermittent episodes of tachypnea which occur randomly at rest.  She reports she hyperventilates.  There are some associated palpitations that occur with it.  She has no chest pain.  She wore a Holter monitor during one of the spells recently and revealed no arrhythmia correlates.  She did have a run of atrial tachycardia that was asymptomatic.    I reviewed her previous cardiac testing from [place].:  She underwent an echocardiogram July 29, 2022 which revealed the following:  Normal LVEF, normal RVSP, mild mitral regurgitation,, otherwise unremarkable    Cardiac catheterization April 18, 2019:  Normal coronary arteries, no evidence of any atherosclerosis    The following portions of the patient's history were reviewed and updated as  "appropriate: allergies, current medications, past family history, past medical history, past social history, past surgical history and problem list.    Past Medical History:   Diagnosis Date    SABAS (acute kidney injury)     2019    Allergic     Arrhythmia     Asthma     Have to look the date up    Deep vein thrombosis 2019    Cerebellar stroke 2019    Diastolic dysfunction     Dizziness     GERD (gastroesophageal reflux disease) In the past.    Much improved    Headache 2024    vertebral?    Hyperlipidemia     Hypertension Over a year ago.    Elevated BP disappeared after diet from Dr. Jenkins, [facility]. Vegan/No Oil Diet.    Lactose intolerance Years    Too sweet    Low back pain 2023    osteoporosis    Near syncope     Obesity 2023    Various    Orthostatic hypotension     Osteopenia     Don’t know date    Osteoporosis     PVC (premature ventricular contraction)     Scoliosis     Have to look up    Sleep apnea     wears C-Pap @ bedtime    Stroke     Tremor     legs. Don’t know why    Visual impairment 2024    Getting worse       Past Surgical History:   Procedure Laterality Date    CARDIAC CATHETERIZATION      See report    CARPAL TUNNEL RELEASE      DENTAL PROCEDURE      crown lenthening         Procedures       Objective:     Vitals:    08/27/24 1105   BP: 116/64   BP Location: Left arm   Patient Position: Sitting   Cuff Size: Adult   Pulse: 76   SpO2: 97%   Weight: 81.8 kg (180 lb 4.8 oz)   Height: 160 cm (62.99\")         Constitutional:       Appearance: Healthy appearance. Not in distress.   Neck:      Vascular: JVD normal.   Pulmonary:      Effort: Pulmonary effort is normal.      Breath sounds: Normal breath sounds.   Cardiovascular:      PMI at left midclavicular line. Normal rate. Regular rhythm. Normal S2.       Murmurs: There is no murmur.      Comments: No signs of overload on exam  Pulses:     Intact distal pulses.   Edema:     Peripheral edema absent.   Skin:     General: Skin is warm and " dry.   Neurological:      General: No focal deficit present.      Mental Status: Alert, oriented to person, place, and time and oriented to person, place and time.   Psychiatric:         Mood and Affect: Mood and affect normal.         Lab Review:     Lipid Panel          5/6/2024    11:58   Lipid Panel   Total Cholesterol 148    Triglycerides 109    HDL Cholesterol 45    VLDL Cholesterol 20    LDL Cholesterol  83    LDL/HDL Ratio 1.80      BUN   Date Value Ref Range Status   05/06/2024 25 (H) 8 - 23 mg/dL Final   02/17/2024 21 8 - 23 mg/dL Final   02/22/2021 15 7 - 20 mg/dL Final     Creatinine   Date Value Ref Range Status   05/06/2024 0.62 0.57 - 1.00 mg/dL Final   02/17/2024 0.62 0.57 - 1.00 mg/dL Final   02/08/2022 0.70 0.6 - 1.3 mg/dL Final     Potassium   Date Value Ref Range Status   05/06/2024 4.3 3.5 - 5.2 mmol/L Final   02/17/2024 4.1 3.5 - 5.2 mmol/L Final   02/22/2021 3.7 3.5 - 5.1 mmol/L Final     ALT (SGPT)   Date Value Ref Range Status   05/06/2024 19 1 - 33 U/L Final   02/17/2024 37 (H) 1 - 33 U/L Final   02/22/2021 25 0 - 35 U/L Final     AST (SGOT)   Date Value Ref Range Status   05/06/2024 18 1 - 32 U/L Final   02/17/2024 22 1 - 32 U/L Final   02/22/2021 27 15 - 46 U/L Final         Performed        Assessment:          Diagnosis Plan   1. Palpitations  Case Request Cath Lab: Loop recorder removal      2. Orthostatic lightheadedness        3. Gastroesophageal reflux disease, unspecified whether esophagitis present        4. Atrial tachycardia  Case Request Cath Lab: Loop recorder removal                 Plan:         Palpitations: Previously been attributed to PACs.   She wore a 14-day Zio patch in January 2023 given her ongoing complaints and the fact that the loop recorder was not picking up any significant episodes.  Did reveal short, nonsustained atrial tachycardia episodes lasting at most 14 seconds, but these were infrequent.  PAC burden less than 1%.  Patient was reassured.  She underwent  a repeat patch Holter given her ongoing dyspnea complaints, which revealed Short nonsustained SVT episodes likely atrial tachycardia, 27 seconds in duration but did not correlate with symptoms.  Her loop recorder is at [facility].  It was placed at [place] [facility] but are now out of network for her.  She has some intermittent pain at the incision site.  We will proceed with loop recorder explant  We discussed other ways to monitor her heart rhythm ambulatory including an Apple Watch with an ECG feature and [employer] Kardia[employer] she will look into this.  So far, no A-fib has been determined on her loop recorder's.  Dyspnea on exertion: Intermittent, atypical episodes.  She reports they worsened after she had a CPAP malfunction recently.     Echocardiogram was repeated, normal, normal BNP, there is report of impaired relaxation diastolic function but on my evaluation appears her diastolic function is normal.  Regardless, there is no signs of CHF on the echo.  There is no signs of CHF on exam and her BNP was normal.  I do not think her heart is causing this problem.  Reviewed her cardiac catheterization from 2019 which revealed normal coronary arteries.  I do not feel ischemic testing is needed at this time as her symptoms do not sound consistent with angina.      RTC 1 year for symptom check-in.  Loop recorder explant in the interim    Candelario Parisi MD  [facility]  08/27/24  10:20 EST       Current Outpatient Medications:     albuterol sulfate  (90 Base) MCG/ACT inhaler, Inhale 2 puffs Every 4 (Four) Hours As Needed., Disp: , Rfl:     ascorbic acid (VITAMIN C) 250 MG tablet, Take 1 tablet by mouth Daily., Disp: , Rfl:     b complex vitamins capsule, Take 1 capsule by mouth., Disp: , Rfl:     CALCIUM PO, Take 720 mg by mouth Daily., Disp: , Rfl:     chlorhexidine (PERIDEX) 0.12 % solution, Apply 15 mL to the mouth or throat 2 (Two) Times a Day., Disp: , Rfl:     cholecalciferol (Vitamin D, Cholecalciferol,) 25  MCG (1000 UT) tablet, Take 5 tablets by mouth Daily., Disp: , Rfl:     Magnesium 400 MG tablet, Take 1 tablet by mouth Daily., Disp: , Rfl:     NON FORMULARY, Calcium, magnesium algae based supplement, Disp: , Rfl:     raloxifene (EVISTA) 60 MG tablet, Take 1 tablet by mouth Daily. Not taking yet pending labs, Disp: , Rfl:     Sodium Fluoride 5000 PPM 1.1 % paste, BRUSH ON AT BEDTIME DO NOT EAT OR DRINK FOR AT LEAST 30 MINUTES, Disp: , Rfl:          No follow-ups on file.      Part of this note may be an electronic transcription/translation of spoken language to printed text using the Dragon Dictation System.

## 2024-08-27 NOTE — H&P (VIEW-ONLY)
[facility]    Subjective:     Encounter Date:08/27/24      Patient ID: Lidia Allen is a 74 y.o. female.    Chief Complaint:   Chief Complaint   Patient presents with    Follow-up     Wants to discuss loop recorder removal   Dyspnea, palpitations  History of Present Illness    Ms. Allen is a 74-year-old lady past medical history palpitations, GERD, who presents for further evaluation palpitations.  She previously followed with Dr. David at [place].  She is transferring her care to [facility].    She has a history of palpitations and a prior CVA.  Given these she had a loop recorder placed.  She previously was on atenolol but did not tolerate due to low heart rate and fatigue.  She was not interested in trying other beta-blockers.  Diltiazem as needed was discussed but she did not feel this was needed.    Most recent ILR interrogation on August 18 2023 did not reveal any arrhythmias.  The ILR was tried to be transferred to our office but was at [facility].  No further interrogations were able to be done.    She follows with nephrology for SIADH.  He had an echocardiogram performed which revealed normal cardiac pressures.    She continues to have some intermittent episodes of tachypnea which occur randomly at rest.  She reports she hyperventilates.  There are some associated palpitations that occur with it.  She has no chest pain.  She wore a Holter monitor during one of the spells recently and revealed no arrhythmia correlates.  She did have a run of atrial tachycardia that was asymptomatic.    I reviewed her previous cardiac testing from [place].:  She underwent an echocardiogram July 29, 2022 which revealed the following:  Normal LVEF, normal RVSP, mild mitral regurgitation,, otherwise unremarkable    Cardiac catheterization April 18, 2019:  Normal coronary arteries, no evidence of any atherosclerosis    The following portions of the patient's history were reviewed and updated as  "appropriate: allergies, current medications, past family history, past medical history, past social history, past surgical history and problem list.    Past Medical History:   Diagnosis Date    SABAS (acute kidney injury)     2019    Allergic     Arrhythmia     Asthma     Have to look the date up    Deep vein thrombosis 2019    Cerebellar stroke 2019    Diastolic dysfunction     Dizziness     GERD (gastroesophageal reflux disease) In the past.    Much improved    Headache 2024    vertebral?    Hyperlipidemia     Hypertension Over a year ago.    Elevated BP disappeared after diet from Dr. Jenkins, [place]. Vegan/No Oil Diet.    Lactose intolerance Years    Too sweet    Low back pain 2023    osteoporosis    Near syncope     Obesity 2023    Various    Orthostatic hypotension     Osteopenia     Don’t know date    Osteoporosis     PVC (premature ventricular contraction)     Scoliosis     Have to look up    Sleep apnea     wears C-Pap @ bedtime    Stroke     Tremor     legs. Don’t know why    Visual impairment 2024    Getting worse       Past Surgical History:   Procedure Laterality Date    CARDIAC CATHETERIZATION      See report    CARPAL TUNNEL RELEASE      DENTAL PROCEDURE      crown lenthening         Procedures       Objective:     Vitals:    08/27/24 1105   BP: 116/64   BP Location: Left arm   Patient Position: Sitting   Cuff Size: Adult   Pulse: 76   SpO2: 97%   Weight: 81.8 kg (180 lb 4.8 oz)   Height: 160 cm (62.99\")         Constitutional:       Appearance: Healthy appearance. Not in distress.   Neck:      Vascular: JVD normal.   Pulmonary:      Effort: Pulmonary effort is normal.      Breath sounds: Normal breath sounds.   Cardiovascular:      PMI at left midclavicular line. Normal rate. Regular rhythm. Normal S2.       Murmurs: There is no murmur.      Comments: No signs of overload on exam  Pulses:     Intact distal pulses.   Edema:     Peripheral edema absent.   Skin:     General: Skin is warm and " dry.   Neurological:      General: No focal deficit present.      Mental Status: Alert, oriented to person, place, and time and oriented to person, place and time.   Psychiatric:         Mood and Affect: Mood and affect normal.         Lab Review:     Lipid Panel          5/6/2024    11:58   Lipid Panel   Total Cholesterol 148    Triglycerides 109    HDL Cholesterol 45    VLDL Cholesterol 20    LDL Cholesterol  83    LDL/HDL Ratio 1.80      BUN   Date Value Ref Range Status   05/06/2024 25 (H) 8 - 23 mg/dL Final   02/17/2024 21 8 - 23 mg/dL Final   02/22/2021 15 7 - 20 mg/dL Final     Creatinine   Date Value Ref Range Status   05/06/2024 0.62 0.57 - 1.00 mg/dL Final   02/17/2024 0.62 0.57 - 1.00 mg/dL Final   02/08/2022 0.70 0.6 - 1.3 mg/dL Final     Potassium   Date Value Ref Range Status   05/06/2024 4.3 3.5 - 5.2 mmol/L Final   02/17/2024 4.1 3.5 - 5.2 mmol/L Final   02/22/2021 3.7 3.5 - 5.1 mmol/L Final     ALT (SGPT)   Date Value Ref Range Status   05/06/2024 19 1 - 33 U/L Final   02/17/2024 37 (H) 1 - 33 U/L Final   02/22/2021 25 0 - 35 U/L Final     AST (SGOT)   Date Value Ref Range Status   05/06/2024 18 1 - 32 U/L Final   02/17/2024 22 1 - 32 U/L Final   02/22/2021 27 15 - 46 U/L Final         Performed        Assessment:          Diagnosis Plan   1. Palpitations  Case Request Cath Lab: Loop recorder removal      2. Orthostatic lightheadedness        3. Gastroesophageal reflux disease, unspecified whether esophagitis present        4. Atrial tachycardia  Case Request Cath Lab: Loop recorder removal                 Plan:         Palpitations: Previously been attributed to PACs.   She wore a 14-day Zio patch in January 2023 given her ongoing complaints and the fact that the loop recorder was not picking up any significant episodes.  Did reveal short, nonsustained atrial tachycardia episodes lasting at most 14 seconds, but these were infrequent.  PAC burden less than 1%.  Patient was reassured.  She underwent  a repeat patch Holter given her ongoing dyspnea complaints, which revealed Short nonsustained SVT episodes likely atrial tachycardia, 27 seconds in duration but did not correlate with symptoms.  Her loop recorder is at [facility].  It was placed at [place] but are now out of network for her.  She has some intermittent pain at the incision site.  We will proceed with loop recorder explant  We discussed other ways to monitor her heart rhythm ambulatory including an Apple Watch with an ECG feature and [employer]ia[employer] she will look into this.  So far, no A-fib has been determined on her loop recorder's.  Dyspnea on exertion: Intermittent, atypical episodes.  She reports they worsened after she had a CPAP malfunction recently.     Echocardiogram was repeated, normal, normal BNP, there is report of impaired relaxation diastolic function but on my evaluation appears her diastolic function is normal.  Regardless, there is no signs of CHF on the echo.  There is no signs of CHF on exam and her BNP was normal.  I do not think her heart is causing this problem.  Reviewed her cardiac catheterization from 2019 which revealed normal coronary arteries.  I do not feel ischemic testing is needed at this time as her symptoms do not sound consistent with angina.      RTC 1 year for symptom check-in.  Loop recorder explant in the interim    Candelario Parisi MD  [facility]  08/27/24  10:20 EST       Current Outpatient Medications:     albuterol sulfate  (90 Base) MCG/ACT inhaler, Inhale 2 puffs Every 4 (Four) Hours As Needed., Disp: , Rfl:     ascorbic acid (VITAMIN C) 250 MG tablet, Take 1 tablet by mouth Daily., Disp: , Rfl:     b complex vitamins capsule, Take 1 capsule by mouth., Disp: , Rfl:     CALCIUM PO, Take 720 mg by mouth Daily., Disp: , Rfl:     chlorhexidine (PERIDEX) 0.12 % solution, Apply 15 mL to the mouth or throat 2 (Two) Times a Day., Disp: , Rfl:     cholecalciferol (Vitamin D, Cholecalciferol,) 25  MCG (1000 UT) tablet, Take 5 tablets by mouth Daily., Disp: , Rfl:     Magnesium 400 MG tablet, Take 1 tablet by mouth Daily., Disp: , Rfl:     NON FORMULARY, Calcium, magnesium algae based supplement, Disp: , Rfl:     raloxifene (EVISTA) 60 MG tablet, Take 1 tablet by mouth Daily. Not taking yet pending labs, Disp: , Rfl:     Sodium Fluoride 5000 PPM 1.1 % paste, BRUSH ON AT BEDTIME DO NOT EAT OR DRINK FOR AT LEAST 30 MINUTES, Disp: , Rfl:          No follow-ups on file.      Part of this note may be an electronic transcription/translation of spoken language to printed text using the Dragon Dictation System.

## 2024-08-27 NOTE — PATIENT INSTRUCTIONS
"We will move forward with removing the loop recorder to relieve any discomfort associated with it.    I would look into obtaining either an [employer] mobile device, which is a way that you can create an EKG at home, if you continue to have palpitations.  The alternative to this would be an \"ECG capable\" apple iWatch which can also create EKGs if you have any new spells.  Thankfully, your monitors have not detected anything significant or life-threatening, but these can be other tools that can be used besides loop recorder's to monitor and make sure that these are not any significantly abnormal heart rhythms like atrial fibrillation.  "

## 2024-09-06 ENCOUNTER — HOSPITAL ENCOUNTER (OUTPATIENT)
Facility: HOSPITAL | Age: 74
Setting detail: HOSPITAL OUTPATIENT SURGERY
Discharge: HOME | End: 2024-09-06
Attending: INTERNAL MEDICINE | Admitting: INTERNAL MEDICINE
Payer: COMMERCIAL

## 2024-09-06 VITALS
WEIGHT: 182 LBS | SYSTOLIC BLOOD PRESSURE: 116 MMHG | OXYGEN SATURATION: 98 % | DIASTOLIC BLOOD PRESSURE: 78 MMHG | HEART RATE: 73 BPM | TEMPERATURE: 97.2 F | RESPIRATION RATE: 20 BRPM | HEIGHT: 63 IN | BODY MASS INDEX: 32.25 KG/M2

## 2024-09-06 DIAGNOSIS — R00.2 PALPITATIONS: ICD-10-CM

## 2024-09-06 DIAGNOSIS — I47.19 ATRIAL TACHYCARDIA: ICD-10-CM

## 2024-09-06 PROCEDURE — 33286 RMVL SUBQ CAR RHYTHM MNTR: CPT | Performed by: INTERNAL MEDICINE

## 2024-09-06 RX ORDER — SODIUM CHLORIDE 9 MG/ML
75 INJECTION, SOLUTION INTRAVENOUS CONTINUOUS
Status: CANCELLED | OUTPATIENT
Start: 2024-09-06

## 2024-09-06 RX ORDER — NITROGLYCERIN 0.4 MG/1
0.4 TABLET SUBLINGUAL
Status: CANCELLED | OUTPATIENT
Start: 2024-09-06

## 2024-09-06 RX ORDER — SODIUM CHLORIDE 0.9 % (FLUSH) 0.9 %
10 SYRINGE (ML) INJECTION AS NEEDED
Status: CANCELLED | OUTPATIENT
Start: 2024-09-06

## 2024-09-10 ENCOUNTER — TELEPHONE (OUTPATIENT)
Dept: INTERNAL MEDICINE | Facility: CLINIC | Age: 74
End: 2024-09-10
Payer: COMMERCIAL

## 2024-09-10 NOTE — TELEPHONE ENCOUNTER
Charo is having her HR department fax over the [facility] paper work. When we receive we will call her to collect $25 paper work fee.

## 2024-09-10 NOTE — TELEPHONE ENCOUNTER
Caller: URSULA SALAZARICA    Relationship: Emergency Contact    Best call back number: [phone] [facility]PROVED [facility]    What was the call regarding: LALO, THROUGH THE PATIENT'S PREVIOUS PCP, HAD FMLA PAPERWORK FILLED OUT TO CARE FOR THE PATIENT.    SHE ASKS DR. SILVESTRE'S PROTOCOL, AS IT IS TIME FOR HER TO RENEW HER FMLA PAPERWORK.    PLEASE GIVE HER A CALL TO LET HER KNOW WHAT SHE NEEDS TO DO TO GET THIS FMLA PAPERWORK COMPLETED.

## 2024-09-13 ENCOUNTER — OFFICE VISIT (OUTPATIENT)
Dept: INTERNAL MEDICINE | Facility: CLINIC | Age: 74
End: 2024-09-13
Payer: COMMERCIAL

## 2024-09-13 VITALS
SYSTOLIC BLOOD PRESSURE: 130 MMHG | WEIGHT: 182.4 LBS | OXYGEN SATURATION: 97 % | BODY MASS INDEX: 32.32 KG/M2 | DIASTOLIC BLOOD PRESSURE: 88 MMHG | TEMPERATURE: 98 F | HEART RATE: 85 BPM | HEIGHT: 63 IN

## 2024-09-13 DIAGNOSIS — R68.83 CHILLS: ICD-10-CM

## 2024-09-13 DIAGNOSIS — R05.1 ACUTE COUGH: Primary | ICD-10-CM

## 2024-09-13 LAB
EXPIRATION DATE: NORMAL
EXPIRATION DATE: NORMAL
FLUAV AG NPH QL: NEGATIVE
FLUBV AG NPH QL: NEGATIVE
INTERNAL CONTROL: NORMAL
INTERNAL CONTROL: NORMAL
Lab: NORMAL
Lab: NORMAL
SARS-COV-2 AG UPPER RESP QL IA.RAPID: NOT DETECTED

## 2024-09-13 PROCEDURE — 87426 SARSCOV CORONAVIRUS AG IA: CPT | Performed by: INTERNAL MEDICINE

## 2024-09-13 PROCEDURE — 99213 OFFICE O/P EST LOW 20 MIN: CPT | Performed by: INTERNAL MEDICINE

## 2024-09-13 PROCEDURE — 1159F MED LIST DOCD IN RCRD: CPT | Performed by: INTERNAL MEDICINE

## 2024-09-13 PROCEDURE — 1160F RVW MEDS BY RX/DR IN RCRD: CPT | Performed by: INTERNAL MEDICINE

## 2024-09-13 PROCEDURE — 87804 INFLUENZA ASSAY W/OPTIC: CPT | Performed by: INTERNAL MEDICINE

## 2024-09-16 ENCOUNTER — TELEPHONE (OUTPATIENT)
Dept: CARDIOLOGY | Facility: CLINIC | Age: 74
End: 2024-09-16
Payer: COMMERCIAL

## 2024-09-16 ENCOUNTER — OFFICE VISIT (OUTPATIENT)
Dept: CARDIOLOGY | Facility: CLINIC | Age: 74
End: 2024-09-16
Payer: COMMERCIAL

## 2024-09-16 ENCOUNTER — TELEPHONE (OUTPATIENT)
Dept: INTERNAL MEDICINE | Facility: CLINIC | Age: 74
End: 2024-09-16

## 2024-09-16 VITALS
SYSTOLIC BLOOD PRESSURE: 134 MMHG | HEART RATE: 79 BPM | BODY MASS INDEX: 31.77 KG/M2 | OXYGEN SATURATION: 96 % | HEIGHT: 63 IN | DIASTOLIC BLOOD PRESSURE: 84 MMHG | WEIGHT: 179.3 LBS

## 2024-09-16 DIAGNOSIS — K21.9 GASTROESOPHAGEAL REFLUX DISEASE, UNSPECIFIED WHETHER ESOPHAGITIS PRESENT: ICD-10-CM

## 2024-09-16 DIAGNOSIS — Z98.890 HISTORY OF LOOP RECORDER: ICD-10-CM

## 2024-09-16 DIAGNOSIS — I47.19 ATRIAL TACHYCARDIA: ICD-10-CM

## 2024-09-16 DIAGNOSIS — R00.2 PALPITATIONS: Primary | ICD-10-CM

## 2024-09-16 DIAGNOSIS — R42 ORTHOSTATIC LIGHTHEADEDNESS: ICD-10-CM

## 2024-09-16 PROCEDURE — 1159F MED LIST DOCD IN RCRD: CPT | Performed by: PHYSICIAN ASSISTANT

## 2024-09-16 PROCEDURE — 1160F RVW MEDS BY RX/DR IN RCRD: CPT | Performed by: PHYSICIAN ASSISTANT

## 2024-09-16 PROCEDURE — 99214 OFFICE O/P EST MOD 30 MIN: CPT | Performed by: PHYSICIAN ASSISTANT

## 2024-09-16 RX ORDER — AMOXICILLIN 875 MG
875 TABLET ORAL 2 TIMES DAILY
Qty: 14 TABLET | Refills: 0 | Status: SHIPPED | OUTPATIENT
Start: 2024-09-16

## 2024-09-16 NOTE — TELEPHONE ENCOUNTER
Caller: Lidia Allen    Relationship: Self    Best call back number: [phone]     What was the call regarding: PATIENT IS NO LONGER NEEDING TO SCHEDULE AN APPT FOR TODAY. PATIENT WOULD LIKE TO UPDATE THAT SHE WILL BE GOING TO SEEK MEDICAL SOMEWHERE ELSE. PATIENT STATES THAT WHEN ASSISTANTS GET CREDENTIALS THAT SHE WILL BE ABLE TO SEEK ASSISTANCE FROM THEM PER INSURANCE.

## 2024-09-16 NOTE — TELEPHONE ENCOUNTER
Caller: Lidia Allen    Relationship to patient: Self    Best call back number: [phone]    Patient is needing:   PATIENT HAD SURGERY AND HER INCISION IS NOW RED. SHE WAS ADVISED TO BE SEEN IF THAT WAS THE CASE.     PLEASE CALL TO SCHEDULE OR ADVISE.

## 2024-09-25 NOTE — TELEPHONE ENCOUNTER
Hub staff attempted to follow warm transfer process and was unsuccessful     Caller: Lidia Allen    Relationship to patient: Self    Best call back number: [phone]      Patient is needing: SHE WOULD LIKE TO SEE IF THERE IS A CO PAY FOR HER VISIT WITH BREANNA LEMUS AND TO MAKE SURE SHE IS COVERED UNDER [employer].

## 2024-09-26 ENCOUNTER — TELEPHONE (OUTPATIENT)
Dept: INTERNAL MEDICINE | Facility: CLINIC | Age: 74
End: 2024-09-26
Payer: COMMERCIAL

## 2024-10-01 ENCOUNTER — OFFICE VISIT (OUTPATIENT)
Dept: INTERNAL MEDICINE | Facility: CLINIC | Age: 74
End: 2024-10-01
Payer: COMMERCIAL

## 2024-10-01 VITALS
DIASTOLIC BLOOD PRESSURE: 80 MMHG | BODY MASS INDEX: 26.75 KG/M2 | TEMPERATURE: 98.6 F | OXYGEN SATURATION: 97 % | SYSTOLIC BLOOD PRESSURE: 140 MMHG | HEART RATE: 78 BPM | WEIGHT: 151 LBS

## 2024-10-01 DIAGNOSIS — R74.01 TRANSAMINITIS: ICD-10-CM

## 2024-10-01 DIAGNOSIS — Z23 NEED FOR VACCINATION: ICD-10-CM

## 2024-10-01 DIAGNOSIS — L29.9 PRURITUS: Primary | ICD-10-CM

## 2024-10-01 DIAGNOSIS — L98.9 SKIN LESION: ICD-10-CM

## 2024-10-01 DIAGNOSIS — L90.5 SCAR: ICD-10-CM

## 2024-10-01 PROCEDURE — 1159F MED LIST DOCD IN RCRD: CPT | Performed by: INTERNAL MEDICINE

## 2024-10-01 PROCEDURE — 99214 OFFICE O/P EST MOD 30 MIN: CPT | Performed by: INTERNAL MEDICINE

## 2024-10-01 PROCEDURE — G0008 ADMIN INFLUENZA VIRUS VAC: HCPCS | Performed by: INTERNAL MEDICINE

## 2024-10-01 PROCEDURE — 90662 IIV NO PRSV INCREASED AG IM: CPT | Performed by: INTERNAL MEDICINE

## 2024-10-01 PROCEDURE — 1160F RVW MEDS BY RX/DR IN RCRD: CPT | Performed by: INTERNAL MEDICINE

## 2024-10-01 NOTE — PROGRESS NOTES
Lidia Allen is a 74 y.o. female who presents with a chief complaint of   Chief Complaint   Patient presents with    Liver issues     Has been having some liver problems has been sick on and off a lot and has been to the ED for liver failure as well.    Nose Problem     Has a spot on her nose that has been getting bigger    Incisional Pain     Has been red and worried about infection.         HPI     Wants me to check scar.  See above concerns.     The following portions of the patient's history were reviewed and updated as appropriate: allergies, current medications, past family history, past medical history, past social history, past surgical history and problem list.      Current Outpatient Medications:     albuterol sulfate  (90 Base) MCG/ACT inhaler, Inhale 2 puffs Every 4 (Four) Hours As Needed., Disp: , Rfl:     ascorbic acid (VITAMIN C) 250 MG tablet, Take 1 tablet by mouth Daily., Disp: , Rfl:     b complex vitamins capsule, Take 1 capsule by mouth., Disp: , Rfl:     CALCIUM PO, Take 720 mg by mouth Daily., Disp: , Rfl:     cholecalciferol (Vitamin D, Cholecalciferol,) 25 MCG (1000 UT) tablet, Take 5 tablets by mouth Daily., Disp: , Rfl:     Magnesium 400 MG tablet, Take 1 tablet by mouth Daily., Disp: , Rfl:     raloxifene (EVISTA) 60 MG tablet, Take 1 tablet by mouth Daily. Not taking yet pending labs, Disp: , Rfl:     Sodium Fluoride 5000 PPM 1.1 % paste, BRUSH ON AT BEDTIME DO NOT EAT OR DRINK FOR AT LEAST 30 MINUTES, Disp: , Rfl:     amoxicillin (AMOXIL) 875 MG tablet, Take 1 tablet by mouth 2 (Two) Times a Day. (Patient not taking: Reported on 10/1/2024), Disp: 14 tablet, Rfl: 0    chlorhexidine (PERIDEX) 0.12 % solution, Apply 15 mL to the mouth or throat 2 (Two) Times a Day. (Patient not taking: Reported on 9/16/2024), Disp: , Rfl:     NON FORMULARY, Calcium, magnesium algae based supplement (Patient not taking: Reported on 10/1/2024), Disp: , Rfl:             Physical Exam  BP  140/80 (BP Location: Left arm, Patient Position: Sitting, Cuff Size: Adult)   Pulse 78   Temp 98.6 °F (37 °C) (Infrared)   Wt 68.5 kg (151 lb)   SpO2 97%   BMI 26.75 kg/m²     Physical Exam  Vitals reviewed.   Constitutional:       General: She is not in acute distress.     Appearance: Normal appearance.   HENT:      Head: Normocephalic and atraumatic.      Nose: Nose normal.      Mouth/Throat:      Mouth: Mucous membranes are moist.   Eyes:      Conjunctiva/sclera: Conjunctivae normal.   Pulmonary:      Effort: Pulmonary effort is normal.   Skin:     General: Skin is warm and dry.   Neurological:      General: No focal deficit present.      Mental Status: She is alert.   Psychiatric:         Mood and Affect: Mood normal.           Results for orders placed or performed in visit on 09/13/24   POCT SARS-CoV-2 Antigen RONAL    Specimen: Swab   Result Value Ref Range    SARS Antigen Not Detected Not Detected, Presumptive Negative    Internal Control Passed Passed    Lot Number [id number]     Expiration Date 4/28/2025    POC Influenza A / B    Specimen: Swab   Result Value Ref Range    Rapid Influenza A Ag Negative Negative    Rapid Influenza B Ag Negative Negative    Internal Control Passed Passed    Lot Number [id number]     Expiration Date 12/12/25            Diagnoses and all orders for this visit:    1. Pruritus (Primary)  -     Comprehensive Metabolic Panel    2. Transaminitis  -     Comprehensive Metabolic Panel    3. Scar    4. Skin lesion  -     Ambulatory Referral to Dermatology    5. Need for vaccination  -     Fluzone High-Dose 65+yrs      Check LFTs as she has had problem with antibiotics before.    Refer to Derm for nose lesion    Flu shot today    Scar looks good, continue current care.

## 2024-10-02 LAB
ALBUMIN SERPL-MCNC: 4.2 G/DL (ref 3.5–5.2)
ALBUMIN/GLOB SERPL: 1.8 G/DL
ALP SERPL-CCNC: 59 U/L (ref 39–117)
ALT SERPL-CCNC: 22 U/L (ref 1–33)
AST SERPL-CCNC: 17 U/L (ref 1–32)
BILIRUB SERPL-MCNC: 0.3 MG/DL (ref 0–1.2)
BUN SERPL-MCNC: 26 MG/DL (ref 8–23)
BUN/CREAT SERPL: 42.6 (ref 7–25)
CALCIUM SERPL-MCNC: 9.2 MG/DL (ref 8.6–10.5)
CHLORIDE SERPL-SCNC: 102 MMOL/L (ref 98–107)
CO2 SERPL-SCNC: 26.8 MMOL/L (ref 22–29)
CREAT SERPL-MCNC: 0.61 MG/DL (ref 0.57–1)
EGFRCR SERPLBLD CKD-EPI 2021: 93.9 ML/MIN/1.73
GLOBULIN SER CALC-MCNC: 2.4 GM/DL
GLUCOSE SERPL-MCNC: 92 MG/DL (ref 65–99)
POTASSIUM SERPL-SCNC: 4.3 MMOL/L (ref 3.5–5.2)
PROT SERPL-MCNC: 6.6 G/DL (ref 6–8.5)
SODIUM SERPL-SCNC: 140 MMOL/L (ref 136–145)

## 2024-11-14 ENCOUNTER — TRANSCRIBE ORDERS (OUTPATIENT)
Dept: BONE DENSITY | Facility: HOSPITAL | Age: 74
End: 2024-11-14
Payer: COMMERCIAL

## 2024-11-14 DIAGNOSIS — M81.0 AGE RELATED OSTEOPOROSIS, UNSPECIFIED PATHOLOGICAL FRACTURE PRESENCE: Primary | ICD-10-CM

## 2024-11-14 DIAGNOSIS — Z51.89 ENCOUNTER FOR MONITORING OF PATIENT COMPLIANCE IN DRUG TREATMENT PROGRAM: ICD-10-CM

## 2024-11-14 DIAGNOSIS — Z79.83 LONG TERM (CURRENT) USE OF BISPHOSPHONATES: ICD-10-CM

## 2024-11-26 ENCOUNTER — APPOINTMENT (OUTPATIENT)
Dept: BONE DENSITY | Facility: HOSPITAL | Age: 74
End: 2024-11-26
Payer: COMMERCIAL

## 2024-11-26 ENCOUNTER — APPOINTMENT (OUTPATIENT)
Dept: ULTRASOUND IMAGING | Facility: HOSPITAL | Age: 74
End: 2024-11-26
Payer: COMMERCIAL

## 2024-11-26 ENCOUNTER — HOSPITAL ENCOUNTER (EMERGENCY)
Facility: HOSPITAL | Age: 74
Discharge: HOME | End: 2024-11-26
Attending: STUDENT IN AN ORGANIZED HEALTH CARE EDUCATION/TRAINING PROGRAM | Admitting: STUDENT IN AN ORGANIZED HEALTH CARE EDUCATION/TRAINING PROGRAM
Payer: COMMERCIAL

## 2024-11-26 VITALS
WEIGHT: 178 LBS | RESPIRATION RATE: 16 BRPM | OXYGEN SATURATION: 99 % | HEIGHT: 63 IN | HEART RATE: 72 BPM | TEMPERATURE: 98.2 F | BODY MASS INDEX: 31.54 KG/M2 | DIASTOLIC BLOOD PRESSURE: 86 MMHG | SYSTOLIC BLOOD PRESSURE: 139 MMHG

## 2024-11-26 DIAGNOSIS — Z51.89 ENCOUNTER FOR MONITORING OF PATIENT COMPLIANCE IN DRUG TREATMENT PROGRAM: ICD-10-CM

## 2024-11-26 DIAGNOSIS — I80.9 THROMBOPHLEBITIS: Primary | ICD-10-CM

## 2024-11-26 DIAGNOSIS — M81.0 AGE RELATED OSTEOPOROSIS, UNSPECIFIED PATHOLOGICAL FRACTURE PRESENCE: ICD-10-CM

## 2024-11-26 DIAGNOSIS — M79.605 LEFT LEG PAIN: ICD-10-CM

## 2024-11-26 DIAGNOSIS — Z79.83 LONG TERM (CURRENT) USE OF BISPHOSPHONATES: ICD-10-CM

## 2024-11-26 PROCEDURE — 77080 DXA BONE DENSITY AXIAL: CPT

## 2024-11-26 PROCEDURE — 99284 EMERGENCY DEPT VISIT MOD MDM: CPT | Performed by: STUDENT IN AN ORGANIZED HEALTH CARE EDUCATION/TRAINING PROGRAM

## 2024-11-26 PROCEDURE — 93971 EXTREMITY STUDY: CPT

## 2024-11-26 RX ORDER — LIDOCAINE 4 G/G
1 PATCH TOPICAL ONCE
Status: DISCONTINUED | OUTPATIENT
Start: 2024-11-26 | End: 2024-11-26 | Stop reason: HOSPADM

## 2024-11-26 RX ADMIN — LIDOCAINE 1 PATCH: 4 PATCH TOPICAL at 10:28

## 2024-11-26 NOTE — ED TRIAGE NOTES
"Pt poor historian with allergies. When asked pt states \" all osteoporosis meds \" and doesn't list anything else  "

## 2024-11-26 NOTE — DISCHARGE INSTRUCTIONS
Thank you for your visit to the Emergency Department.     It was a pleasure to take care of you in the emergency room today.     Today you were seen for pain to the left lower leg. We performed a thorough history and physical exam.  We obtain an ultrasound for your leg which shows that you have a blood clot in the superficial veins, not a deep vein.  This is not a condition called deep vein thrombosis or DVT.  Usually, the treatment for this is warm compress, NSAID medication such as ibuprofen or naproxen.  However, since you do not tolerate the medications well, we advised Tylenol or lidocaine patch as needed to the area to help with the pain and apply warm compress.  You do not need a blood thinner at this time because you do not have a deep vein thrombosis or clots in the deep veins.     Please return to the nearest ED or call 911 if you experience:    Worsening symptoms, severe pain, difficulty breathing, chest pain, fever that doesn't break with Tylenol or Motrin, uncontrolled vomiting or diarrhea that doesn't stop, passing out, lethargy (drowsiness, hard to wake up), numbness/tingling/weakness on one side, speech difficulty, cannot walk, or any concerns for limb/life threatening issues.    The Emergency Department is open 24 hours a day.     Please follow up with: your primary care doctor within 1-3 days.    In the meantime, please:  Take acetaminophen 650mg every 6-8 hours  on a full stomach as needed for pain or fever.   Continue to take any other existing medications as prescribed.

## 2024-11-26 NOTE — ED PROVIDER NOTES
"Subjective   History of Present Illness see MDM     Review of Systems   Constitutional:  Negative for fever.   Respiratory:  Negative for shortness of breath.    Cardiovascular:  Negative for chest pain.   Gastrointestinal:  Negative for abdominal pain, nausea and vomiting.   Genitourinary:  Negative for dysuria.   Musculoskeletal:         Left leg pain   Skin:  Negative for rash and wound.   Neurological:  Negative for weakness and numbness.       Past Medical History:   Diagnosis Date    SABAS (acute kidney injury)     2019    Allergic     Arrhythmia     Asthma     Have to look the date up    Deep vein thrombosis 2019    Cerebellar stroke 2019    Diastolic dysfunction     Dizziness     GERD (gastroesophageal reflux disease) In the past.    Much improved    Headache 2024    vertebral?    Hyperlipidemia     Hypertension Over a year ago.    Elevated BP disappeared after diet from Dr. Jenkins, [place]. Vegan/No Oil Diet.    Lactose intolerance Years    Too sweet    Low back pain 2023    osteoporosis    Near syncope     Obesity 2023    Various    Orthostatic hypotension     Osteopenia     Don’t know date    Osteoporosis     PVC (premature ventricular contraction)     Scoliosis     Have to look up    Sleep apnea     wears C-Pap @ bedtime    Stroke     Tremor     legs. Don’t know why    Visual impairment 2024    Getting worse       Allergies   Allergen Reactions    Caffeine Palpitations and Other (See Comments)     SVT...    Cephalexin Palpitations and Other (See Comments)    Cyclobenzaprine Unknown - Low Severity and Other (See Comments)     Wakes up \"gasping for air\"    Naproxen Other (See Comments) and Palpitations     Patient wakes up \"gasping for air\".  Kidney injury    Alendronate Rash    Beta Adrenergic Blockers Other (See Comments)     Breathing problems      Diltiazem Other (See Comments)     Pt reports more dysrhythmias with this medication, but she took 40mg this AM.      Ibuprofen Other (See " Comments)    Ranitidine Hcl Other (See Comments)    Sugar-Protein-Starch Unknown - Low Severity    Wheat Extract GI Intolerance     Other reaction(s): GI Intolerance, Stomach Upset    Aspirin Rash and Other (See Comments)    Clarithromycin Palpitations    Levofloxacin Other (See Comments) and Rash    Ranitidine Other (See Comments)     Patient doesn't know if she has a reaction to this medication, she was taking this with the other medications that were causing her issues.       Past Surgical History:   Procedure Laterality Date    CARDIAC CATHETERIZATION      See report    CARDIAC ELECTROPHYSIOLOGY PROCEDURE N/A 9/6/2024    Procedure: Loop recorder removal;  Surgeon: Santiago Yeager MD;  Location: [facility] INVASIVE LOCATION;  Service: Cardiovascular;  Laterality: N/A;  loop removal    CARPAL TUNNEL RELEASE      DENTAL PROCEDURE      crown lenthening       Family History   Problem Relation Age of Onset    Alzheimer's disease Father     Supraventricular tachycardia Father     Diabetes Mother     Arthritis Mother     Asthma Sister         Asthma    Colon cancer Neg Hx     Colon polyps Neg Hx     Liver cancer Neg Hx     Stomach cancer Neg Hx     Breast cancer Neg Hx     Ovarian cancer Neg Hx     Uterine cancer Neg Hx        Social History     Socioeconomic History    Marital status:    Tobacco Use    Smoking status: Never     Passive exposure: Never    Smokeless tobacco: Never   Vaping Use    Vaping status: Never Used   Substance and Sexual Activity    Alcohol use: Never    Drug use: Never    Sexual activity: Defer           Objective   Physical Exam  Vitals and nursing note reviewed.   Constitutional:       Appearance: Normal appearance.   HENT:      Head: Atraumatic.      Right Ear: External ear normal.      Left Ear: External ear normal.      Nose: Nose normal.      Mouth/Throat:      Pharynx: Oropharynx is clear.   Eyes:      Conjunctiva/sclera: Conjunctivae normal.   Cardiovascular:      Rate and  Rhythm: Normal rate.      Pulses: Normal pulses.      Comments: The patient with 2+ DP pulses bilaterally.  Pulmonary:      Effort: Pulmonary effort is normal. No respiratory distress.   Abdominal:      General: Abdomen is flat. There is no distension.   Musculoskeletal:         General: No swelling or tenderness.      Cervical back: Neck supple.      Right lower leg: No edema.      Left lower leg: No edema.        Legs:       Comments: The patient with varicose vein in bilateral lower legs.  The area of pain she pointed to was in the proximal lower leg on the left side.  There is no swelling, redness, tenderness to palpation.  The compartments are soft.  She is able to range bilateral ankles, knees and hips.   Skin:     General: Skin is warm and dry.   Neurological:      General: No focal deficit present.      Mental Status: She is alert and oriented to person, place, and time. Mental status is at baseline.      Sensory: No sensory deficit.      Motor: No weakness.      Gait: Gait normal.      Comments: The patient with normal range of motion, strength of the hips, knees and ankles.  Normal sensation.   Psychiatric:         Behavior: Behavior normal.         Procedures           ED Course  ED Course as of 11/26/24 1500   Tue Nov 26, 2024   1118 US Venous Doppler Lower Extremity Left (duplex)  Impression:  Evidence for acute to subacute superficial occlusive thrombi involving the distal veins of the right calf. There is no evidence for additional venous thrombosis involving the more proximal deep veins of the right thigh.   [DL]   1205 I came to bedside to speak with the patient about the results of the DVT ultrasound.  There is no DVT.  She does have thrombophlebitis.  I advised the patient warm compress, pain medication at home and close follow-up with her PCP and vascular surgeon.  She voiced understanding and agreement to this plan. [DL]      ED Course User Index  [DL] Jona Purvis MD                            "                            Medical Decision Making  This is a 74-year-old female patient, history of SIADH, following nephrology, recent echocardiogram which revealed normal cardiac pressures, prior CVA, who came to the emergency room for 5 days of intermittent, sometimes constant pain of the left proximal lower leg.  She states that she has never had this before but it was hurting on the right leg that she saw a vascular surgeon.  She was advised to wear a stocking. She points to the area on the right lower leg where there are multiple varicose veins and states \" is a mess and they (vascular surgery) need to fix it\".  The area of pain she points to was also on the same level as the right on the left leg with varicose veins.    She states that she has not taken any pain medications because she has a lot of allergies.  She otherwise denies any numbness, tingling, weakness, trouble walking.    On physical exam, the patient is well-appearing.  Appears anxious but otherwise nontoxic or ill-appearing.  She has normal range of motion of bilateral hips, knees and ankles.  She has 2+ DP pulses bilaterally.  Normal cap refill.  She has no swelling, redness or warmth to touch of the area of pain.  The knee is completely normal with range of motion.  There is no pitting edema.  Calf compartments are soft and nontender.  No evidence of cellulitis.  She is awake and alert x 4, conversing appropriately.  She has no focal neurodeficit.  She has normal gait.    Assessment: The patient with a small area of pain on the proximal left lower leg.  Appears to be around the area of varicose veins.  Could be a thrombophlebitis but does not appear to be red or tender or warm to touch.  She does have a history of DVT so I will obtain a DVT ultrasound.  The compartments are soft with no swelling so I doubt fluid retention, CHF, ascites, nephrotic or cirrhotic disorders.  There is no trauma.  She is able to walk.  I doubt bony fractures or " dislocation.  No evidence of cellulitis, skin infection, skin abscess or necrotizing fasciitis, given a very benign exam with no pain on proportion.  There are no rashes to suggest shingles.    Workup: DVT ultrasound.    Treatment: I offer pain medication and she refused multiple times.  She states that is not hurting her and only 2 out of 10.  Then I offered a lidocaine patch which she agreed to.      Risk  OTC drugs.      Updates: DVT ultrasound with no DVT but shows thrombophlebitis.  Advised the patient NSAID medications and warm compress.  At this time, there is no evidence of DVT so I do not think that she needs blood thinner medication.  I did advise the patient to closely follow-up with her PCP, vascular surgery and discuss outpatient management of her symptoms.  I told her about return precautions.  She voiced understanding and agreement to this plan.    Please note that portions of this note were completed with a voice recognition program.         Final diagnoses:   Thrombophlebitis   Left leg pain       ED Disposition  ED Disposition       ED Disposition   Discharge    Condition   Stable    Comment   --               Keshia Perry MD  [address] [address] [phone]    In 1 day      [facility] EMERGENCY DEPARTMENT  [address] [facility] [address] [phone]    If symptoms worsen         Medication List      No changes were made to your prescriptions during this visit.            Jona Purvis MD  11/26/24 [id number]

## 2024-11-27 ENCOUNTER — OFFICE VISIT (OUTPATIENT)
Dept: INTERNAL MEDICINE | Facility: CLINIC | Age: 74
End: 2024-11-27
Payer: COMMERCIAL

## 2024-11-27 VITALS
DIASTOLIC BLOOD PRESSURE: 80 MMHG | HEART RATE: 86 BPM | TEMPERATURE: 98 F | HEIGHT: 63 IN | SYSTOLIC BLOOD PRESSURE: 130 MMHG | WEIGHT: 179 LBS | BODY MASS INDEX: 31.71 KG/M2 | OXYGEN SATURATION: 96 %

## 2024-11-27 DIAGNOSIS — I82.812 ACUTE SUPERFICIAL VENOUS THROMBOSIS OF LOWER EXTREMITY, LEFT: Primary | ICD-10-CM

## 2024-11-27 DIAGNOSIS — R09.81 SINUS CONGESTION: ICD-10-CM

## 2024-11-27 DIAGNOSIS — I80.9 THROMBOPHLEBITIS: ICD-10-CM

## 2024-11-27 NOTE — PROGRESS NOTES
Lidia Allen is a 74 y.o. female, who presents with a chief complaint of   Chief Complaint   Patient presents with    blood clot     Was seen in ER yesterday, clot found in right leg.            HPI   Pt was in the ED yesterday bc of pain in her left calf.  She had an acute to subacute superficial occlusive thrombi involving the distal veins of the left calf.  No evidence for DVT.   Pt has a history of a stroke so she is very nervous about having a blood clot.  She is nervous about being on evista.  She wants to stop the evista until she discusses with endocrinology.  She follow with Dr. Santana with vascular surgery bc of her varicose veins.  She has compression socks but she doesn't wear them bc the make her legs hurt. She has an appt for u/s and f/u with dr. Santana on 12/11/24.    She would like her sinuses checked.  She says she doesn't tolerate sinus medications.  she has not tolerated a neti pot.       The following portions of the patient's history were reviewed and updated as appropriate: allergies, current medications, past family history, past medical history, past social history, past surgical history and problem list.    Allergies: Caffeine, Cephalexin, Cyclobenzaprine, Naproxen, Alendronate, Beta adrenergic blockers, Diltiazem, Ibuprofen, Ranitidine hcl, Sugar-protein-starch, Wheat extract, Aspirin, Clarithromycin, Levofloxacin, and Ranitidine    Review of Systems   Constitutional: Negative.    HENT: Negative.     Eyes: Negative.    Respiratory: Negative.     Cardiovascular: Negative.    Gastrointestinal: Negative.    Endocrine: Negative.    Genitourinary: Negative.    Musculoskeletal: Negative.    Skin: Negative.    Allergic/Immunologic: Negative.    Neurological: Negative.    Hematological: Negative.    Psychiatric/Behavioral: Negative.     All other systems reviewed and are negative.            Wt Readings from Last 3 Encounters:   11/27/24 81.2 kg (179 lb)   11/26/24 80.7 kg (178 lb)    10/01/24 68.5 kg (151 lb)     Temp Readings from Last 3 Encounters:   11/27/24 98 °F (36.7 °C) (Infrared)   11/26/24 98.2 °F (36.8 °C) (Oral)   10/01/24 98.6 °F (37 °C) (Infrared)     BP Readings from Last 3 Encounters:   11/27/24 130/80   11/26/24 139/86   10/01/24 140/80     Pulse Readings from Last 3 Encounters:   11/27/24 86   11/26/24 72   10/01/24 78     Body mass index is 31.71 kg/m².  SpO2 Readings from Last 3 Encounters:   11/27/24 96%   11/26/24 99%   10/01/24 97%          Physical Exam  Vitals and nursing note reviewed.   Constitutional:       General: She is not in acute distress.     Appearance: She is well-developed.   HENT:      Head: Normocephalic and atraumatic.      Right Ear: External ear normal.      Left Ear: External ear normal.      Nose: Nose normal.   Eyes:      Conjunctiva/sclera: Conjunctivae normal.      Pupils: Pupils are equal, round, and reactive to light.   Cardiovascular:      Rate and Rhythm: Normal rate and regular rhythm.      Heart sounds: Normal heart sounds.   Pulmonary:      Effort: Pulmonary effort is normal. No respiratory distress.      Breath sounds: Normal breath sounds. No wheezing.   Musculoskeletal:         General: Normal range of motion.      Cervical back: Normal range of motion and neck supple.      Comments: Normal gait   Skin:     General: Skin is warm and dry.   Neurological:      Mental Status: She is alert and oriented to person, place, and time.   Psychiatric:         Behavior: Behavior normal.         Thought Content: Thought content normal.         Judgment: Judgment normal.         Results for orders placed or performed in visit on 10/01/24   Comprehensive Metabolic Panel    Collection Time: 10/01/24  1:09 PM    Specimen: Blood   Result Value Ref Range    Glucose 92 65 - 99 mg/dL    BUN 26 (H) 8 - 23 mg/dL    Creatinine 0.61 0.57 - 1.00 mg/dL    EGFR Result 93.9 >60.0 mL/min/1.73    BUN/Creatinine Ratio 42.6 (H) 7.0 - 25.0    Sodium 140 136 - 145 mmol/L     Potassium 4.3 3.5 - 5.2 mmol/L    Chloride 102 98 - 107 mmol/L    Total CO2 26.8 22.0 - 29.0 mmol/L    Calcium 9.2 8.6 - 10.5 mg/dL    Total Protein 6.6 6.0 - 8.5 g/dL    Albumin 4.2 3.5 - 5.2 g/dL    Globulin 2.4 gm/dL    A/G Ratio 1.8 g/dL    Total Bilirubin 0.3 0.0 - 1.2 mg/dL    Alkaline Phosphatase 59 39 - 117 U/L    AST (SGOT) 17 1 - 32 U/L    ALT (SGPT) 22 1 - 33 U/L     Result Review :   The following data was reviewed by: Mell Frances MD on 11/27/2024:  Common labs          5/6/2024    11:58 6/12/2024    14:04 10/1/2024    13:09   Common Labs   Glucose 96   92    BUN 25   26    Creatinine 0.62   0.61    Sodium 142   140    Potassium 4.3   4.3    Chloride 104   102    Calcium 9.3   9.2    Total Protein 6.8   6.6    Albumin 4.4   4.2    Total Bilirubin 0.4   0.3    Alkaline Phosphatase 61   59    AST (SGOT) 18   17    ALT (SGPT) 19   22    WBC 7.36  8.64        Hemoglobin 14.9  14.6        Hematocrit 44.5  43.9        Platelets 223  219        Total Cholesterol 148      Triglycerides 109      HDL Cholesterol 45      LDL Cholesterol  83      Hemoglobin A1C 5.60         Details          This result is from an external source.             Data reviewed : Radiologic studies ultrasound of left lower extremity and Recent hospitalization notes recent ER notes as well as past vascular consultation and 11/13 nephrology note           Assessment and Plan    Diagnoses and all orders for this visit:    1. Acute superficial venous thrombosis of lower extremity, left (Primary) -imaging reviewed.  Patient has an acute versus subacute superficial venous thrombosis of the left lower extremity.  No sign of TB.  She is not on an anticoagulant.  She is concerned about being on Avista.  She would like to hold the medicine at this time.  Is okay to hold Evista until she is able to discuss with her endocrinologist.    2. Thrombophlebitis -recommend supportive care, compression hose, heat, and elevation of extremity to help  with pain and discomfort.  Okay for Tylenol    3. Sinus congestion  -patient reports she has failed multiple medications including cold medications nasal sprays and is allergic to multiple antibiotics.  Rec pt try Flonase Zyrtec or nasal saline for congestion.  It is also okay to use saline to try to do a Netti pot to flush out her sinuses.                 I spent 30 minutes caring for Lidia on this date of service. This time includes time spent by me in the following activities:preparing for the visit, reviewing tests, performing a medically appropriate examination and/or evaluation , counseling and educating the patient/family/caregiver, referring and communicating with other health care professionals , documenting information in the medical record, independently interpreting results and communicating that information with the patient/family/caregiver, and care coordination      Outpatient Medications Prior to Visit   Medication Sig Dispense Refill    albuterol sulfate  (90 Base) MCG/ACT inhaler Inhale 2 puffs Every 4 (Four) Hours As Needed.      ascorbic acid (VITAMIN C) 250 MG tablet Take 1 tablet by mouth Daily.      b complex vitamins capsule Take 1 capsule by mouth.      CALCIUM PO Take 720 mg by mouth Daily.      cholecalciferol (Vitamin D, Cholecalciferol,) 25 MCG (1000 UT) tablet Take 5 tablets by mouth Daily.      Magnesium 400 MG tablet Take 1 tablet by mouth Daily.      Sodium Fluoride 5000 PPM 1.1 % paste BRUSH ON AT BEDTIME DO NOT EAT OR DRINK FOR AT LEAST 30 MINUTES      chlorhexidine (PERIDEX) 0.12 % solution Apply 15 mL to the mouth or throat 2 (Two) Times a Day. (Patient not taking: Reported on 11/27/2024)      NON FORMULARY Calcium, magnesium algae based supplement (Patient not taking: Reported on 11/27/2024)      raloxifene (EVISTA) 60 MG tablet Take 1 tablet by mouth Daily. Not taking yet pending labs (Patient not taking: Reported on 11/27/2024)      amoxicillin (AMOXIL) 875 MG tablet  Take 1 tablet by mouth 2 (Two) Times a Day. (Patient not taking: Reported on 11/27/2024) 14 tablet 0     No facility-administered medications prior to visit.     No orders of the defined types were placed in this encounter.    [unfilled]  Medications Discontinued During This Encounter   Medication Reason    amoxicillin (AMOXIL) 875 MG tablet          No follow-ups on file.    Patient was given instructions and counseling regarding her condition or for health maintenance advice. Please see specific information pulled into the AVS if appropriate.

## 2024-12-10 ENCOUNTER — TELEPHONE (OUTPATIENT)
Dept: INTERNAL MEDICINE | Facility: CLINIC | Age: 74
End: 2024-12-10

## 2024-12-10 NOTE — TELEPHONE ENCOUNTER
Caller: Lidia Allen    Relationship: Self    Best call back number:   Telephone Information:   Mobile [phone]     What is the best time to reach you: ANYTIME    Who are you requesting to speak with (clinical staff, provider,  specific staff member): CLINICAL    What was the call regarding: PATIENT CALLED IN AND STATED THAT A FEW MONTHS AGO HER DAUGHTER HAD SENT OVER [place] PAPERWORK THAT NEEDED TO BE FILLED OUT. (ENCOUNTER REGARDING THIS IS FROM 9/10/24)  THE FMLA PAPERWORK IS FOR PATIENT'S DAUGHTER TO CARE FOR PATIENT AND IT IS NEEDED FOR RENEWAL. PATIENT STATED THAT HER DAUGHTER NEVER HEARD ANYTHING BACK REGARDING THIS. PLEASE CALLBACK WITH UPDATE.    Is it okay if the provider responds through [facility]: YES, EITHER MYCHART OR PHONE CALL

## 2024-12-19 NOTE — TELEPHONE ENCOUNTER
Hub staff attempted to follow warm transfer process and was unsuccessful     Caller: Lidia Allen    Relationship to patient: Self    Best call back number: [phone]     Patient is needing: AN UPDATE ON LA PAPERWORK

## 2024-12-20 NOTE — TELEPHONE ENCOUNTER
This [facility] paperwork is in your folder to be completed, the name of her daughter is Charo Paz.

## 2024-12-23 NOTE — TELEPHONE ENCOUNTER
PT STATES THAT SHE WOULD PREFER HER PAPERWORK FILLED OUT ASAP, RATHER THAN WAITING FOR HER APPT. PLEASE CALLBACK ONCE COMPLETE

## 2025-01-15 ENCOUNTER — OFFICE VISIT (OUTPATIENT)
Dept: INTERNAL MEDICINE | Facility: CLINIC | Age: 75
End: 2025-01-15
Payer: COMMERCIAL

## 2025-01-15 VITALS
DIASTOLIC BLOOD PRESSURE: 78 MMHG | HEIGHT: 63 IN | BODY MASS INDEX: 32.25 KG/M2 | HEART RATE: 77 BPM | SYSTOLIC BLOOD PRESSURE: 132 MMHG | OXYGEN SATURATION: 98 % | TEMPERATURE: 98.7 F | WEIGHT: 182 LBS

## 2025-01-15 DIAGNOSIS — Z23 NEED FOR VACCINATION: ICD-10-CM

## 2025-01-15 DIAGNOSIS — M54.16 LUMBAR RADICULOPATHY: Primary | ICD-10-CM

## 2025-01-15 DIAGNOSIS — Z78.9 VEGAN DIET: ICD-10-CM

## 2025-01-15 DIAGNOSIS — E87.1 HYPONATREMIA: ICD-10-CM

## 2025-01-15 DIAGNOSIS — I82.811 ACUTE SUPERFICIAL VENOUS THROMBOSIS OF RIGHT LOWER EXTREMITY: ICD-10-CM

## 2025-01-15 DIAGNOSIS — R42 ORTHOSTATIC LIGHTHEADEDNESS: ICD-10-CM

## 2025-01-15 LAB
ALBUMIN SERPL-MCNC: 4.4 G/DL (ref 3.5–5.2)
ALBUMIN/GLOB SERPL: 1.9 G/DL
ALP SERPL-CCNC: 62 U/L (ref 39–117)
ALT SERPL-CCNC: 20 U/L (ref 1–33)
AST SERPL-CCNC: 18 U/L (ref 1–32)
BILIRUB SERPL-MCNC: 0.4 MG/DL (ref 0–1.2)
BUN SERPL-MCNC: 20 MG/DL (ref 8–23)
BUN/CREAT SERPL: 30.8 (ref 7–25)
CALCIUM SERPL-MCNC: 9.4 MG/DL (ref 8.6–10.5)
CHLORIDE SERPL-SCNC: 103 MMOL/L (ref 98–107)
CO2 SERPL-SCNC: 27.7 MMOL/L (ref 22–29)
CREAT SERPL-MCNC: 0.65 MG/DL (ref 0.57–1)
EGFRCR SERPLBLD CKD-EPI 2021: 92.5 ML/MIN/1.73
ERYTHROCYTE [DISTWIDTH] IN BLOOD BY AUTOMATED COUNT: 12 % (ref 12.3–15.4)
GLOBULIN SER CALC-MCNC: 2.3 GM/DL
GLUCOSE SERPL-MCNC: 105 MG/DL (ref 65–99)
HCT VFR BLD AUTO: 44.7 % (ref 34–46.6)
HGB BLD-MCNC: 15.3 G/DL (ref 12–15.9)
MAGNESIUM SERPL-MCNC: 2.2 MG/DL (ref 1.6–2.4)
MCH RBC QN AUTO: 31.7 PG (ref 26.6–33)
MCHC RBC AUTO-ENTMCNC: 34.2 G/DL (ref 31.5–35.7)
MCV RBC AUTO: 92.7 FL (ref 79–97)
PLATELET # BLD AUTO: 259 10*3/MM3 (ref 140–450)
POTASSIUM SERPL-SCNC: 4.5 MMOL/L (ref 3.5–5.2)
PROT SERPL-MCNC: 6.7 G/DL (ref 6–8.5)
RBC # BLD AUTO: 4.82 10*6/MM3 (ref 3.77–5.28)
SODIUM SERPL-SCNC: 141 MMOL/L (ref 136–145)
VIT B12 SERPL-MCNC: 880 PG/ML (ref 211–946)
WBC # BLD AUTO: 7.13 10*3/MM3 (ref 3.4–10.8)

## 2025-01-15 PROCEDURE — 90677 PCV20 VACCINE IM: CPT | Performed by: INTERNAL MEDICINE

## 2025-01-15 PROCEDURE — G0009 ADMIN PNEUMOCOCCAL VACCINE: HCPCS | Performed by: INTERNAL MEDICINE

## 2025-01-15 PROCEDURE — 99214 OFFICE O/P EST MOD 30 MIN: CPT | Performed by: INTERNAL MEDICINE

## 2025-01-15 RX ORDER — ASPIRIN 81 MG/1
81 TABLET ORAL DAILY
COMMUNITY

## 2025-01-15 NOTE — PROGRESS NOTES
"Chief Complaint  Pruritus (Follow up)    Subjective        Lidia Allen presents to [facility] PRIMARY CARE  History of Present Illness    Refer to Donnie specifically with our PT to help with lumbar radiculopathy.  She may have had an injury from moving a garbage can.  She was seen at [facility] and they recommended PT, but she has not been too happy with the PT she is seeing currently.     Objective   Vital Signs:  /78 (BP Location: Left arm, Patient Position: Sitting, Cuff Size: Adult)   Pulse 77   Temp 98.7 °F (37.1 °C) (Infrared)   Ht 160 cm (63\")   Wt 82.6 kg (182 lb)   SpO2 98%   BMI 32.24 kg/m²   Estimated body mass index is 32.24 kg/m² as calculated from the following:    Height as of this encounter: 160 cm (63\").    Weight as of this encounter: 82.6 kg (182 lb).            Physical Exam  Vitals reviewed.   Constitutional:       General: She is not in acute distress.     Appearance: Normal appearance.   HENT:      Head: Normocephalic and atraumatic.      Nose: Nose normal.      Mouth/Throat:      Mouth: Mucous membranes are moist.   Eyes:      Conjunctiva/sclera: Conjunctivae normal.   Pulmonary:      Effort: Pulmonary effort is normal.   Skin:     General: Skin is warm and dry.   Neurological:      General: No focal deficit present.      Mental Status: She is alert.   Psychiatric:         Mood and Affect: Mood normal.        Result Review :  The following data was reviewed by: Keshia Perry MD on 01/15/2025:  Common labs          6/12/2024    14:04 10/1/2024    13:09 1/15/2025    10:47   Common Labs   Glucose  92  105    BUN  26  20    Creatinine  0.61  0.65    Sodium  140  141    Potassium  4.3  4.5    Chloride  102  103    Calcium  9.2  9.4    Total Protein  6.6  6.7    Albumin  4.2  4.4    Total Bilirubin  0.3  0.4    Alkaline Phosphatase  59  62    AST (SGOT)  17  18    ALT (SGPT)  22  20    WBC 8.64      7.13    Hemoglobin 14.6      15.3    Hematocrit 43.9     "  44.7    Platelets 219      259       Details          This result is from an external source.             Data reviewed : reviewed [facility] spine note           Assessment and Plan   Diagnoses and all orders for this visit:    1. Lumbar radiculopathy (Primary)  -     Ambulatory Referral to Physical Therapy for Evaluation & Treatment    2. Acute superficial venous thrombosis of right lower extremity    3. Need for vaccination  -     Pneumococcal Conjugate Vaccine 20-Valent All    4. Hyponatremia  -     Comprehensive Metabolic Panel    5. Vegan diet  -     CBC (No Diff)  -     Vitamin B12    6. Orthostatic lightheadedness  -     Comprehensive Metabolic Panel  -     Magnesium      Had superficial acute to subacute venous thrombosis 11/26/24 and has made the decision to stop Evista.  She is taking ASA every 3 days due to stomach AE.  I think this is reasonable given aspirin's half life.     She will let me know who she would like to be referred to for colonoscopy.          Follow Up   Return in about 6 months (around 7/15/2025) for Medicare Wellness.  Patient was given instructions and counseling regarding her condition or for health maintenance advice. Please see specific information pulled into the AVS if appropriate.

## 2025-01-16 ENCOUNTER — OFFICE VISIT (OUTPATIENT)
Dept: OBSTETRICS AND GYNECOLOGY | Facility: CLINIC | Age: 75
End: 2025-01-16
Payer: COMMERCIAL

## 2025-01-16 VITALS
DIASTOLIC BLOOD PRESSURE: 84 MMHG | WEIGHT: 180 LBS | BODY MASS INDEX: 31.89 KG/M2 | HEIGHT: 63 IN | SYSTOLIC BLOOD PRESSURE: 134 MMHG

## 2025-01-16 DIAGNOSIS — N39.41 URGE INCONTINENCE: ICD-10-CM

## 2025-01-16 DIAGNOSIS — N89.8 VAGINAL DISCHARGE: Primary | ICD-10-CM

## 2025-01-16 DIAGNOSIS — Z13.89 SCREENING FOR GENITOURINARY CONDITION: ICD-10-CM

## 2025-01-16 NOTE — PROGRESS NOTES
"Subjective     Chief Complaint   Patient presents with    Vaginal Pain    Vaginal Itching       Lidia Allen is a 74 y.o.  whose LMP is No LMP recorded. Patient is postmenopausal.. She presents with c/o vaginal irritation and burning. This started months ago.  She denies discharge. She has some itching. She reports the soap she uses causes burning. She has tried coconut oil which has helped. Denies vaginal bleeding. She has tried hydrocortisone cream and neomycin ointment.     She is not sexually active.     She has urinary urgency and frequency.   She is wondering if the feminine pad that she uses for precaution is causing her irritation.     She has a history of stroke and is not a candidate for estrogen therapy.     HPI    HPI    The following portions of the patient's history were reviewed and updated as appropriate:vital signs, allergies, current medications, past medical history, past social history, past surgical history, and problem list      Review of Systems     Review of Systems   Constitutional: Negative.    Genitourinary:         Vaginal  irritation and burning          Objective      /84   Ht 160 cm (63\")   Wt 81.6 kg (180 lb)   Breastfeeding No   BMI 31.89 kg/m²     Physical Exam    Physical Exam  Vitals and nursing note reviewed.   Constitutional:       Appearance: Normal appearance. She is well-developed.   Abdominal:      General: Bowel sounds are normal.      Palpations: Abdomen is soft.   Genitourinary:     Exam position: Supine.      Labia:         Right: Tenderness present. No rash, lesion or injury.         Left: Tenderness present. No rash, lesion or injury.       Vagina: No signs of injury and foreign body. Erythema present. No vaginal discharge, tenderness or bleeding.      Cervix: No cervical motion tenderness, discharge or friability.      Uterus: Not deviated, not enlarged, not fixed and not tender.       Adnexa:         Right: No mass, tenderness or fullness.     "      Left: No mass, tenderness or fullness.        Rectum: Normal.          Comments: Atrophy noted   Skin:     General: Skin is warm and dry.   Neurological:      General: No focal deficit present.      Mental Status: She is alert and oriented to person, place, and time.   Psychiatric:         Mood and Affect: Mood normal.         Behavior: Behavior normal.       Lab Review   Labs: Urinalysis - with micro     Imaging   No data reviewed    Assessment  Diagnoses and all orders for this visit:    1. Screening for genitourinary condition (Primary)  -     POC Urinalysis Dipstick        Additional Assessment:   Vaginal irritation     Plan     Vaginal irritation- Suspect atrophy. Check NuSwab. Not a candidate for estrogen. Plan to treat based of swab results if +. Enc use of Replens, Revaree, or NeuEve for vaginal moisturizer. Disc can use Aquaphor or Vaseline as well.     RTO PRN     Vonda Gilmore, DOTTIE  1/16/2025

## 2025-01-22 LAB
A VAGINAE DNA VAG QL NAA+PROBE: NORMAL SCORE
BVAB2 DNA VAG QL NAA+PROBE: NORMAL SCORE
C ALBICANS DNA VAG QL NAA+PROBE: NEGATIVE
C GLABRATA DNA VAG QL NAA+PROBE: NEGATIVE
M GENITALIUM DNA SPEC QL NAA+PROBE: NEGATIVE
M HOMINIS DNA SPEC QL NAA+PROBE: NEGATIVE
MEGA1 DNA VAG QL NAA+PROBE: NORMAL SCORE
UREAPLASMA DNA SPEC QL NAA+PROBE: NEGATIVE

## 2025-02-24 ENCOUNTER — TELEPHONE (OUTPATIENT)
Dept: INTERNAL MEDICINE | Facility: CLINIC | Age: 75
End: 2025-02-24
Payer: COMMERCIAL

## 2025-02-24 NOTE — TELEPHONE ENCOUNTER
Patient was exercising and thought she might have over done it, started having pain and can walk but very slowly. Is really hurting. Scheduled her with Luz Maria tomorrow as she did not know if she could make it in today.

## 2025-02-24 NOTE — TELEPHONE ENCOUNTER
Hub staff attempted to follow warm transfer process and was unsuccessful     Caller: Lidia Allen    Relationship to patient: Self    Best call back number: [phone]     Patient is needing: PATIENT THINKS THEY HAVE STRESS FRACTURES IN BOTH LEGS AND IS TRYING TO SCHEDULE AN OFFICE VISIT, HOPEFULLY 02/26

## 2025-02-25 ENCOUNTER — OFFICE VISIT (OUTPATIENT)
Dept: INTERNAL MEDICINE | Facility: CLINIC | Age: 75
End: 2025-02-25
Payer: COMMERCIAL

## 2025-02-25 VITALS
TEMPERATURE: 98.4 F | HEIGHT: 63 IN | DIASTOLIC BLOOD PRESSURE: 78 MMHG | BODY MASS INDEX: 31.89 KG/M2 | OXYGEN SATURATION: 98 % | SYSTOLIC BLOOD PRESSURE: 142 MMHG | HEART RATE: 87 BPM | WEIGHT: 180.01 LBS

## 2025-02-25 DIAGNOSIS — M54.16 CHRONIC RADICULAR LUMBAR PAIN: Primary | ICD-10-CM

## 2025-02-25 DIAGNOSIS — G89.29 CHRONIC RADICULAR LUMBAR PAIN: Primary | ICD-10-CM

## 2025-02-25 PROCEDURE — 99213 OFFICE O/P EST LOW 20 MIN: CPT | Performed by: NURSE PRACTITIONER

## 2025-02-25 NOTE — PROGRESS NOTES
"Chief Complaint   Patient presents with    Leg Pain     Has some pain in bilateral legs that comes and goes. Has been ongoing for about the last year or two  now. Gets sharp stabbing pains in her legs        Subjective     Lidia Allen is a 74 y.o. female being seen for an acute visit for chronic leg pain. This started 2 years ago. It worsened 3 days ago after doing some bone density exercises where she was standing on 1 leg. Described as \"sharp, shooting pain\" with burning rated 10 of 10. She denies swelling, redness or skin changes.  She denies a pattern. The pain occurs with walking and at rest. She walks 20 minutes per day. She has chronic low pain pain. She cannot take NSAIDsdue to hx Acute kidney injury.     Leg Pain          Allergies   Allergen Reactions    Caffeine Palpitations and Other (See Comments)     SVT...    Cephalexin Palpitations and Other (See Comments)    Cyclobenzaprine Unknown - Low Severity and Other (See Comments)     Wakes up \"gasping for air\"    Evista [Raloxifene] Other (See Comments)     Blood Clots    Naproxen Other (See Comments) and Palpitations     Patient wakes up \"gasping for air\".  Kidney injury    Alendronate Rash    Beta Adrenergic Blockers Other (See Comments)     Breathing problems      Diltiazem Other (See Comments)     Pt reports more dysrhythmias with this medication, but she took 40mg this AM.      Ibuprofen Other (See Comments)    Ranitidine Hcl Other (See Comments)    Sugar-Protein-Starch Unknown - Low Severity    Wheat Extract GI Intolerance     Other reaction(s): GI Intolerance, Stomach Upset    Aspirin Rash and Other (See Comments)    Clarithromycin Palpitations    Levofloxacin Other (See Comments) and Rash    Ranitidine Other (See Comments)     Patient doesn't know if she has a reaction to this medication, she was taking this with the other medications that were causing her issues.         Current Outpatient Medications:     albuterol sulfate  (90 Base) " MCG/ACT inhaler, Inhale 2 puffs Every 4 (Four) Hours As Needed., Disp: , Rfl:     ascorbic acid (VITAMIN C) 250 MG tablet, Take 1 tablet by mouth Daily., Disp: , Rfl:     aspirin 81 MG EC tablet, Take 1 tablet by mouth Daily. Takes 1 tablet about every 3 days, Disp: , Rfl:     b complex vitamins capsule, Take 1 capsule by mouth., Disp: , Rfl:     CALCIUM PO, Take 720 mg by mouth Daily., Disp: , Rfl:     cholecalciferol (Vitamin D, Cholecalciferol,) 25 MCG (1000 UT) tablet, Take 5 tablets by mouth Daily., Disp: , Rfl:     Emollient (COLLAGEN EX), Apply  topically., Disp: , Rfl:     Magnesium 400 MG tablet, Take 1 tablet by mouth Daily., Disp: , Rfl:     The following portions of the patient's history were reviewed and updated as appropriate: allergies, current medications, past family history, past medical history, past social history, past surgical history, and problem list.    Review of Systems   Respiratory: Negative.     Cardiovascular:  Negative for chest pain, palpitations and leg swelling.   Gastrointestinal: Negative.    Musculoskeletal:  Positive for arthralgias and back pain.       Assessment     Physical Exam  Vitals reviewed.   Constitutional:       Appearance: Normal appearance.   Cardiovascular:      Rate and Rhythm: Normal rate and regular rhythm.      Pulses: Normal pulses.      Heart sounds: No murmur heard.  Pulmonary:      Effort: Pulmonary effort is normal. No respiratory distress.      Breath sounds: Normal breath sounds. No stridor.   Musculoskeletal:      Lumbar back: Tenderness present. No swelling, deformity, lacerations, spasms or bony tenderness. Decreased range of motion. Negative right straight leg raise test and negative left straight leg raise test.   Neurological:      Mental Status: She is alert.   Psychiatric:         Mood and Affect: Mood normal.         Behavior: Behavior normal.         Thought Content: Thought content normal.         Plan     Her XR reviewed:     XR Spine Lumbar  2 or 3 View (01/08/2025 14:15)     Diagnoses and all orders for this visit:    1. Chronic radicular lumbar pain (Primary)      Discussed use of Elavil at night, she declines.     Discussed Lumbar radiculopathy, treatment and diagnosis. She cannot take NSAIDs. Back exercises given in AV materials and PT referral in place.     Follow up with PCP and PT

## 2025-02-26 ENCOUNTER — OFFICE VISIT (OUTPATIENT)
Dept: CARDIOLOGY | Facility: CLINIC | Age: 75
End: 2025-02-26
Payer: COMMERCIAL

## 2025-02-26 VITALS
OXYGEN SATURATION: 97 % | HEIGHT: 63 IN | SYSTOLIC BLOOD PRESSURE: 140 MMHG | BODY MASS INDEX: 31.57 KG/M2 | WEIGHT: 178.2 LBS | DIASTOLIC BLOOD PRESSURE: 76 MMHG

## 2025-02-26 DIAGNOSIS — I47.19 ATRIAL TACHYCARDIA: ICD-10-CM

## 2025-02-26 DIAGNOSIS — R42 DIZZINESS: ICD-10-CM

## 2025-02-26 DIAGNOSIS — R42 ORTHOSTATIC LIGHTHEADEDNESS: Primary | ICD-10-CM

## 2025-02-26 DIAGNOSIS — R00.2 PALPITATIONS: ICD-10-CM

## 2025-02-26 NOTE — PROGRESS NOTES
[facility]    Subjective:     Encounter Date:02/26/25      Patient ID: Lidia Allen is a 74 y.o. female.    Chief Complaint:   Chief Complaint   Patient presents with    Palpitations     Patient is in the office today for an follow up appointment.    Dyspnea, palpitations  History of Present Illness    Ms. Allen is a 74 y.o. lady past medical history palpitations, GERD, who presents for further evaluation palpitations.  She previously followed with Dr. David at [place].  She is transferring her care to [facility].    She has a history of palpitations and a prior CVA.  Given these she had a loop recorder placed.  She previously was on atenolol but did not tolerate due to low heart rate and fatigue.  She was not interested in trying other beta-blockers.  Diltiazem as needed was discussed but she did not feel this was needed.    Most recent ILR interrogation on August 18 2023 did not reveal any arrhythmias.  The ILR was tried to be transferred to our office but was at [facility].  No further interrogations were able to be done.    She follows with nephrology for SIADH.  He had an echocardiogram performed which revealed normal cardiac pressures and no evidence of CHF in December 2023    She continues to have some intermittent episodes of tachypnea which occur randomly at rest.  She reports she hyperventilates.  There are some associated palpitations that occur with it.  She has no chest pain.  She wore a Holter monitor during one of the spells in July 2024 recently and revealed no arrhythmia correlates.  She did have a run of atrial tachycardia that was asymptomatic.    Her loop recorder had some intermittent pain, and it was explanted recently.  She tolerated it well.  There is limited redness around the incision site but otherwise was fine.    She presents today with a complaint of dizziness.  She reports that it is similar to previous episode she has had and presents for cardiac  evaluation of this.  She has had previous workup including ENT evaluations really could not quite induce vestibular nystagmus.  She has sensation of the room spinning sometimes when she moves her head in a certain direction.  She has not lost consciousness.  It does not seem to correlate with palpitations.    I reviewed her previous cardiac testing from [place].:  She underwent an echocardiogram July 29, 2022 which revealed the following:  Normal LVEF, normal RVSP, mild mitral regurgitation,, otherwise unremarkable    Cardiac catheterization April 18, 2019:  Normal coronary arteries, no evidence of any atherosclerosis    The following portions of the patient's history were reviewed and updated as appropriate: allergies, current medications, past family history, past medical history, past social history, past surgical history and problem list.    Past Medical History:   Diagnosis Date    SABAS (acute kidney injury)     2019    Allergic     Arrhythmia     Asthma     Have to look the date up    Deep vein thrombosis 2019    Cerebellar stroke 2019    Diastolic dysfunction     Dizziness     GERD (gastroesophageal reflux disease) In the past.    Much improved    Headache 2024    vertebral?    Hyperlipidemia     Hypertension Over a year ago.    Elevated BP disappeared after diet from Dr. Jenkins, [facility]. Vegan/No Oil Diet.    Lactose intolerance Years    Too sweet    Low back pain 2023    osteoporosis    Near syncope     Obesity 2023    Various    Orthostatic hypotension     Osteopenia     Don’t know date    Osteoporosis     PVC (premature ventricular contraction)     Scoliosis     Have to look up    Sleep apnea     wears C-Pap @ bedtime    Stroke     Tremor     legs. Don’t know why    Visual impairment 2024    Getting worse       Past Surgical History:   Procedure Laterality Date    CARDIAC CATHETERIZATION      See report    CARDIAC ELECTROPHYSIOLOGY PROCEDURE N/A 9/6/2024    Procedure: Loop recorder removal;   "Surgeon: Satniago Yeager MD;  Location: [facility] INVASIVE LOCATION;  Service: Cardiovascular;  Laterality: N/A;  loop removal    CARPAL TUNNEL RELEASE      DENTAL PROCEDURE      crown lenthening         Procedures       Objective:     Vitals:    02/26/25 0954   BP: 140/76   BP Location: Left arm   Patient Position: Sitting   Cuff Size: Adult   SpO2: 97%   Weight: 80.8 kg (178 lb 3.2 oz)   Height: 160 cm (63\")         Constitutional:       Appearance: Healthy appearance. Not in distress.   Neck:      Vascular: JVD normal.   Pulmonary:      Effort: Pulmonary effort is normal.      Breath sounds: Normal breath sounds.   Cardiovascular:      PMI at left midclavicular line. Normal rate. Regular rhythm. Normal S2.       Murmurs: There is no murmur.      Comments: No signs of overload on exam  Pulses:     Intact distal pulses.   Edema:     Peripheral edema absent.   Skin:     General: Skin is warm and dry.   Neurological:      General: No focal deficit present.      Mental Status: Alert, oriented to person, place, and time and oriented to person, place and time.   Psychiatric:         Mood and Affect: Mood and affect normal.       Lab Review:     Lipid Panel          5/6/2024    11:58   Lipid Panel   Total Cholesterol 148    Triglycerides 109    HDL Cholesterol 45    VLDL Cholesterol 20    LDL Cholesterol  83    LDL/HDL Ratio 1.80      BUN   Date Value Ref Range Status   01/15/2025 20 8 - 23 mg/dL Final   02/17/2024 21 8 - 23 mg/dL Final   02/22/2021 15 7 - 20 mg/dL Final     Creatinine   Date Value Ref Range Status   01/15/2025 0.65 0.57 - 1.00 mg/dL Final   02/17/2024 0.62 0.57 - 1.00 mg/dL Final   02/08/2022 0.70 0.6 - 1.3 mg/dL Final     Potassium   Date Value Ref Range Status   01/15/2025 4.5 3.5 - 5.2 mmol/L Final   02/17/2024 4.1 3.5 - 5.2 mmol/L Final   02/22/2021 3.7 3.5 - 5.1 mmol/L Final     ALT (SGPT)   Date Value Ref Range Status   01/15/2025 20 1 - 33 U/L Final   02/17/2024 37 (H) 1 - 33 U/L Final "   02/22/2021 25 0 - 35 U/L Final     AST (SGOT)   Date Value Ref Range Status   01/15/2025 18 1 - 32 U/L Final   02/17/2024 22 1 - 32 U/L Final   02/22/2021 27 15 - 46 U/L Final         Performed        Assessment:         No diagnosis found.             Plan:         Palpitations: Previously been attributed to PACs.   There were no arrhythmia correlates, she wore a 14-day Zio patch in January 2023 given her ongoing complaints and the fact that the loop recorder was not picking up any significant episodes.  Did reveal short, nonsustained atrial tachycardia episodes lasting at most 14 seconds, but these were infrequent, and not symptomatic.  PAC burden less than 1%..  Short nonsustained SVT episodes likely atrial tachycardia, 27 seconds in duration but did not correlate with symptoms.  Loop recorder was explanted.    Her palpitations do not have an arrhythmia correlates and I think it is either anxiety or functional related.  I would not recommend additional testing or treatment at this time.     Dyspnea on exertion: Intermittent, atypical episodes.    She has undergone a comprehensive workup with normal echocardiograms,   normal BNP, there is report of impaired relaxation diastolic function but on my evaluation appears her diastolic function is normal.     Reviewed her cardiac catheterization from 2019 which revealed normal coronary arteries.  I do not feel ischemic testing is needed at this time as her symptoms do not sound consistent with angina.   I do not see a cardiovascular cause of these complaint  Positional lightheadedness.  Orthostatics today showing normal findings.  Supine /83, heart rate 70, Standing 140/80, heart rate 72.  I do not see a cardiovascular contribution of this.  If it persist, encouraged to discuss with her PCP repeat vertigo testing.  Certainly clinically sounds like this.  I do not see an arrhythmia or blood flow delivery problem here.         In evaluation today, do not see a  cardiovascular cause of her current complaints.  Some of her dizziness may be vertigo related but she has had a rather extensive workup from her PCP in the past.  But it does clinically sound like vertigo.  Defer further workup to primary care.    RTC as scheduled in July given her history of atrial tachycardia.    Candelario Parisi MD  [facility]  02/26/25  10:20 EST       Current Outpatient Medications:     albuterol sulfate  (90 Base) MCG/ACT inhaler, Inhale 2 puffs Every 4 (Four) Hours As Needed., Disp: , Rfl:     ascorbic acid (VITAMIN C) 250 MG tablet, Take 1 tablet by mouth Daily., Disp: , Rfl:     aspirin 81 MG EC tablet, Take 1 tablet by mouth Daily. Takes 1 tablet about every 3 days, Disp: , Rfl:     b complex vitamins capsule, Take 1 capsule by mouth., Disp: , Rfl:     CALCIUM PO, Take 720 mg by mouth Daily., Disp: , Rfl:     cholecalciferol (Vitamin D, Cholecalciferol,) 25 MCG (1000 UT) tablet, Take 5 tablets by mouth Daily., Disp: , Rfl:     Emollient (COLLAGEN EX), Apply  topically., Disp: , Rfl:     Magnesium 400 MG tablet, Take 1 tablet by mouth Daily., Disp: , Rfl:          No follow-ups on file.      Part of this note may be an electronic transcription/translation of spoken language to printed text using the Dragon Dictation System.

## 2025-02-26 NOTE — PATIENT INSTRUCTIONS
If you continue to have some dizziness when you change positions, try to increase your fluid intake a bit with some salty beverages.  This should help minimize symptoms.  Thankfully, your cardiac testing has been normal.  The blood pressure test today are normal.

## 2025-03-11 ENCOUNTER — OFFICE VISIT (OUTPATIENT)
Dept: INTERNAL MEDICINE | Facility: CLINIC | Age: 75
End: 2025-03-11
Payer: COMMERCIAL

## 2025-03-11 ENCOUNTER — TELEPHONE (OUTPATIENT)
Dept: INTERNAL MEDICINE | Facility: CLINIC | Age: 75
End: 2025-03-11
Payer: COMMERCIAL

## 2025-03-11 VITALS
TEMPERATURE: 102 F | HEIGHT: 63 IN | WEIGHT: 179 LBS | SYSTOLIC BLOOD PRESSURE: 130 MMHG | OXYGEN SATURATION: 97 % | DIASTOLIC BLOOD PRESSURE: 78 MMHG | HEART RATE: 92 BPM | BODY MASS INDEX: 31.71 KG/M2

## 2025-03-11 DIAGNOSIS — J02.9 SORE THROAT: ICD-10-CM

## 2025-03-11 DIAGNOSIS — R05.9 COUGH, UNSPECIFIED TYPE: ICD-10-CM

## 2025-03-11 DIAGNOSIS — U07.1 COVID-19: Primary | ICD-10-CM

## 2025-03-11 LAB
EXPIRATION DATE: ABNORMAL
EXPIRATION DATE: NORMAL
EXPIRATION DATE: NORMAL
FLUAV AG NPH QL: NEGATIVE
FLUBV AG NPH QL: NEGATIVE
INTERNAL CONTROL: ABNORMAL
INTERNAL CONTROL: NORMAL
INTERNAL CONTROL: NORMAL
Lab: ABNORMAL
Lab: NORMAL
Lab: NORMAL
S PYO AG THROAT QL: NEGATIVE
SARS-COV-2 AG UPPER RESP QL IA.RAPID: DETECTED

## 2025-03-11 RX ORDER — ALBUTEROL SULFATE 90 UG/1
2 INHALANT RESPIRATORY (INHALATION) EVERY 4 HOURS PRN
Qty: 18 G | Refills: 8 | Status: SHIPPED | OUTPATIENT
Start: 2025-03-11

## 2025-03-11 NOTE — PROGRESS NOTES
"Chief Complaint   Patient presents with    Cough    Generalized Body Aches    Sore Throat    Fever     Symptoms have been ongoing for the last 3 days        Subjective     Lidia Allen is a 74 y.o. female being seen for an acute visit for fever and cough for 3 days. Associated fever and body aches. Exposed by ill son.     Cough  Associated symptoms include a fever and a sore throat.   Sore Throat   Associated symptoms include coughing.   Fever   Associated symptoms include coughing and a sore throat.        Allergies   Allergen Reactions    Caffeine Palpitations and Other (See Comments)     SVT...    Cephalexin Palpitations and Other (See Comments)    Cyclobenzaprine Unknown - Low Severity and Other (See Comments)     Wakes up \"gasping for air\"    Evista [Raloxifene] Other (See Comments)     Blood Clots    Naproxen Other (See Comments) and Palpitations     Patient wakes up \"gasping for air\".  Kidney injury    Alendronate Rash    Beta Adrenergic Blockers Other (See Comments)     Breathing problems      Diltiazem Other (See Comments)     Pt reports more dysrhythmias with this medication, but she took 40mg this AM.      Ibuprofen Other (See Comments)    Ranitidine Hcl Other (See Comments)    Sugar-Protein-Starch Unknown - Low Severity    Wheat Extract GI Intolerance     Other reaction(s): GI Intolerance, Stomach Upset    Aspirin Rash and Other (See Comments)    Clarithromycin Palpitations    Levofloxacin Other (See Comments) and Rash    Ranitidine Other (See Comments)     Patient doesn't know if she has a reaction to this medication, she was taking this with the other medications that were causing her issues.         Current Outpatient Medications:     albuterol sulfate  (90 Base) MCG/ACT inhaler, Inhale 2 puffs Every 4 (Four) Hours As Needed., Disp: , Rfl:     ascorbic acid (VITAMIN C) 250 MG tablet, Take 1 tablet by mouth Daily., Disp: , Rfl:     aspirin 81 MG EC tablet, Take 1 tablet by mouth Daily. " Takes 1 tablet about every 3 days, Disp: , Rfl:     b complex vitamins capsule, Take 1 capsule by mouth., Disp: , Rfl:     CALCIUM PO, Take 720 mg by mouth Daily., Disp: , Rfl:     cholecalciferol (Vitamin D, Cholecalciferol,) 25 MCG (1000 UT) tablet, Take 5 tablets by mouth Daily., Disp: , Rfl:     Emollient (COLLAGEN EX), Apply  topically., Disp: , Rfl:     Magnesium 400 MG tablet, Take 1 tablet by mouth Daily., Disp: , Rfl:     The following portions of the patient's history were reviewed and updated as appropriate: allergies, current medications, past family history, past medical history, past social history, past surgical history, and problem list.    Review of Systems   Constitutional:  Positive for fever.   HENT:  Positive for sore throat.    Eyes: Negative.    Respiratory:  Positive for cough.    Musculoskeletal: Negative.    All other systems reviewed and are negative.      Assessment     Physical Exam  Vitals reviewed.   Constitutional:       Appearance: Normal appearance. She is ill-appearing.   HENT:      Right Ear: Decreased hearing noted.      Left Ear: Decreased hearing noted.   Cardiovascular:      Rate and Rhythm: Normal rate and regular rhythm.      Pulses: Normal pulses.      Heart sounds: Normal heart sounds. No murmur heard.  Pulmonary:      Effort: Pulmonary effort is normal. No respiratory distress.      Breath sounds: Normal breath sounds. No stridor.   Skin:     General: Skin is warm and dry.   Neurological:      General: No focal deficit present.      Mental Status: She is alert and oriented to person, place, and time.   Psychiatric:         Mood and Affect: Mood normal.         Behavior: Behavior normal.         Thought Content: Thought content normal.         Plan     POC Covid neg  POC Flu A and B neg  POC strep Neg    Diagnoses and all orders for this visit:    1. COVID-19 (Primary)  -     Nirmatrelvir & Ritonavir, 300mg/100mg, (PAXLOVID); Take 3 tablets by mouth 2 (Two) Times a Day.   Dispense: 30 tablet; Refill: 0    2. Cough, unspecified type  -     POC Influenza A / B  -     POCT SHUN SARS-CoV-2 Antigen RONAL  -     POC Rapid Strep A    3. Sore throat  -     POC Influenza A / B  -     POCT SHUN SARS-CoV-2 Antigen RONAL  -     POC Rapid Strep A    Will send in refill for albuterol HFA if needed.     Hold Calcium, mg and B complex while on Paxlovid. Discussed possible  side effects of nausea and diarrhea. Quarantine your self for 5 days. Discussed viral etiology and treatment. S/s of viral pneumonia discussed.     Follow up as needed

## 2025-03-11 NOTE — TELEPHONE ENCOUNTER
Caller: Lidia Allen     Best call back number: [phone]    What is your medical concern? PATIENT STATES THAT SHE WAS GIVEN PAXLOVID TODAY. PATIENT HAS TAKEN CALCIUM, MAGNESIUM AND VITAMIN D TODAY. CAN SHE TAKE THIS TODAY OR SHOULD SHE WAIT UNTIL TOMORROW?

## 2025-03-11 NOTE — TELEPHONE ENCOUNTER
Returned patient's call - I was able to advise, she can start with calcium, just not calcium channel blockers - ok to take with magnesium as well. Patient voiced understanding.

## 2025-03-13 ENCOUNTER — TELEPHONE (OUTPATIENT)
Dept: INTERNAL MEDICINE | Facility: CLINIC | Age: 75
End: 2025-03-13

## 2025-03-13 NOTE — TELEPHONE ENCOUNTER
Caller: Lidia Allen    Relationship: Self    Best call back number: [phone]     What was the call regarding: PATIENT CALLED TO FOLLOW UP, AND MENTIONED THAT THE ER DOCTOR SHE SAW PRESCRIBED A HIGH DOSE OF PREDNISONE ONCE A DAY. 3 PILLS, 20MG EACH, ONCE A DAY FOR 4 DAYS. WHEN SHE LOOKED IT UP ONLINE, SHE SAW THAT PREDNISONE AND PAXLOVID CAN HAVE MODERATE INTERACTIONS. REQUESTS CALL BACK TO DISCUSS WHAT ELSE SHE SHOULD NOW.

## 2025-03-13 NOTE — TELEPHONE ENCOUNTER
Caller: Lidia Allen    Relationship: Self    Best call back number: [phone]     Which medication are you concerned about: Nirmatrelvir & Ritonavir, 300mg/100mg, (PAXLOVID)    Who prescribed you this medication: ANJANA MÁRQUEZ    What are your concerns: PATIENT STATED SHE WAS DIAGNOSED WITH COVID AND PRESCRIBED PAXLOVID ON 03-.    PATIENT STATED ON 03-, SHE EXPERIENCED HEART PALPITATIONS AFTER TAKING THIS MEDICATION, AND VISITED THE [facility] ER.    PATIENT STATED THE ER TOLD HER TO STOP TAKING THIS MEDICATION.    PATIENT STATED SHE WAS PRESCRIBED PREDNISONE BY THE ER FOR THIS INSTEAD OF PAXLOVID.    PATIENT STATED SINCE SHE HAS NOT BEEN TAKING THIS MEDICATION, SHE HAS NOT BEEN EXPERIENCING HEART PALPITATIONS.    PATIENT IS REQUESTING TO KNOW IF SHE SHOULD TAKE A LOWER DOSE OF PAXLOVID, OR IF THERE IS SOMETHING ELSE SHE SHOULD TAKE INSTEAD, OR IF SHE SHOULD NOT TAKE ANYTHING AT ALL.    PATIENT STATED SHE NO LONGER HAS A FEVER, AND SHE NOW HAS A MILD COUGH WHICH HAS IMPROVED.    PLEASE CONTACT PATIENT TO ADVISE.    GENIA SAUCEDA PER PATIENT.

## 2025-03-13 NOTE — TELEPHONE ENCOUNTER
Caller: Lidia Allen    Relationship: Self    Best call back number: [phone]     What was the call regarding: PATIENT IS CALLING BACK TO ASK DR SILVESTRE TO CONFER WITH DR. WALDRON / NEPHROLOGIST [phone]    SHE HAS SIADH (DX FROM DR. WALDRON)    PATIENT IS UNABLE TO REACH DR WALDRON TO ASK HER RECOMMENDATION FOR COVID MEDICATION AND DOSE.     PATIENT HAS BEEN TO ER FOR EXPERIENCING PALPITATIONS FROM INITIAL DOSE OF PAXLOVID AND WANTS TO KNOW IF SHE COULD DO A REDUCED DOSE.     ER DOCTOR HAS PUT HER ON PREDNISONE. SHE DOESN'T THINK SHE IS SUPPOSE TO TAKE PREDNISONE WITH PAXLOVID.     PATIENT WOULD LIKE A CALL TO DISCUSS.

## 2025-03-14 NOTE — TELEPHONE ENCOUNTER
Caller: Lidia Allen    Relationship to patient: Self    Best call back number: [phone]     Chief complaint: ER FOLLOWUP FOR PALPITATIONS    Type of visit: OV OR HOSPITAL FOLLOWUP    Requested date: 3/17/25 OR 3/18/25    If rescheduling, when is the original appointment: N/A     Additional notes: SABINA ARRIAGA

## 2025-03-18 ENCOUNTER — PATIENT MESSAGE (OUTPATIENT)
Dept: INTERNAL MEDICINE | Facility: CLINIC | Age: 75
End: 2025-03-18

## 2025-03-18 ENCOUNTER — OFFICE VISIT (OUTPATIENT)
Dept: INTERNAL MEDICINE | Facility: CLINIC | Age: 75
End: 2025-03-18
Payer: COMMERCIAL

## 2025-03-18 VITALS
HEART RATE: 74 BPM | HEIGHT: 63 IN | WEIGHT: 179 LBS | TEMPERATURE: 98.4 F | SYSTOLIC BLOOD PRESSURE: 112 MMHG | RESPIRATION RATE: 16 BRPM | OXYGEN SATURATION: 97 % | BODY MASS INDEX: 31.71 KG/M2 | DIASTOLIC BLOOD PRESSURE: 74 MMHG

## 2025-03-18 DIAGNOSIS — J45.20 MILD INTERMITTENT REACTIVE AIRWAY DISEASE WITHOUT COMPLICATION: Primary | ICD-10-CM

## 2025-03-18 DIAGNOSIS — R05.3 POST-COVID CHRONIC COUGH: ICD-10-CM

## 2025-03-18 DIAGNOSIS — U09.9 POST-COVID CHRONIC COUGH: ICD-10-CM

## 2025-03-18 PROCEDURE — G2211 COMPLEX E/M VISIT ADD ON: HCPCS | Performed by: INTERNAL MEDICINE

## 2025-03-18 PROCEDURE — 1160F RVW MEDS BY RX/DR IN RCRD: CPT | Performed by: INTERNAL MEDICINE

## 2025-03-18 PROCEDURE — 99214 OFFICE O/P EST MOD 30 MIN: CPT | Performed by: INTERNAL MEDICINE

## 2025-03-18 PROCEDURE — 1159F MED LIST DOCD IN RCRD: CPT | Performed by: INTERNAL MEDICINE

## 2025-03-18 RX ORDER — ALBUTEROL SULFATE 0.83 MG/ML
2.5 SOLUTION RESPIRATORY (INHALATION) EVERY 4 HOURS PRN
Qty: 3 ML | Refills: 12 | Status: SHIPPED | OUTPATIENT
Start: 2025-03-18

## 2025-03-18 NOTE — PROGRESS NOTES
"Chief Complaint  Cough (Productive cough) and Shortness of Breath (Int shortness of air)    Subjective        Lidia Allen presents to [facility]  History of Present Illness    Had palpitations on Paxlovid.  Had some SOA and tried the albuterol has not helped.  Likely has reactive airway disease.     Had normal PFTs in 2019, but she has been told she has asthma in the past.     Objective   Vital Signs:  /74 (BP Location: Left arm, Patient Position: Sitting, Cuff Size: Large Adult)   Pulse 74   Temp 98.4 °F (36.9 °C) (Infrared)   Resp 16   Ht 160 cm (63\")   Wt 81.2 kg (179 lb)   SpO2 97%   BMI 31.71 kg/m²   Estimated body mass index is 31.71 kg/m² as calculated from the following:    Height as of this encounter: 160 cm (63\").    Weight as of this encounter: 81.2 kg (179 lb).            Physical Exam  Vitals reviewed.   Constitutional:       General: She is not in acute distress.     Appearance: Normal appearance.   HENT:      Head: Normocephalic and atraumatic.      Nose: Nose normal.      Mouth/Throat:      Mouth: Mucous membranes are moist.   Eyes:      Conjunctiva/sclera: Conjunctivae normal.   Cardiovascular:      Rate and Rhythm: Normal rate and regular rhythm.   Pulmonary:      Effort: Pulmonary effort is normal.      Breath sounds: Wheezing present.   Skin:     General: Skin is warm and dry.   Neurological:      General: No focal deficit present.      Mental Status: She is alert.   Psychiatric:         Mood and Affect: Mood normal.        Result Review :  The following data was reviewed by: Keshia Perry MD on 03/18/2025:  Common labs          6/12/2024    14:04 10/1/2024    13:09 1/15/2025    10:47   Common Labs   Glucose  92  105    BUN  26  20    Creatinine  0.61  0.65    Sodium  140  141    Potassium  4.3  4.5    Chloride  102  103    Calcium  9.2  9.4    Albumin  4.2  4.4    Total Bilirubin  0.3  0.4    Alkaline Phosphatase  59  62    AST (SGOT)  17  18  "   ALT (SGPT)  22  20    WBC 8.64      7.13    Hemoglobin 14.6      15.3    Hematocrit 43.9      44.7    Platelets 219      259       Details          This result is from an external source.             Data reviewed : reviewed note from Luz Maria Guzman           Assessment and Plan   Diagnoses and all orders for this visit:    1. Mild intermittent reactive airway disease without complication (Primary)  -     albuterol (PROVENTIL) (2.5 MG/3ML) 0.083% nebulizer solution; Take 2.5 mg by nebulization Every 4 (Four) Hours As Needed for Wheezing.  Dispense: 3 mL; Refill: 12    2. Post-COVID chronic cough  -     albuterol (PROVENTIL) (2.5 MG/3ML) 0.083% nebulizer solution; Take 2.5 mg by nebulization Every 4 (Four) Hours As Needed for Wheezing.  Dispense: 3 mL; Refill: 12      Treat with PRN albuterol and if not improving, call clinic.          Follow Up   Return in about 8 weeks (around 5/13/2025) for f/u mild intermittent asthma/RAD.  Patient was given instructions and counseling regarding her condition or for health maintenance advice. Please see specific information pulled into the AVS if appropriate.

## 2025-05-15 ENCOUNTER — OFFICE VISIT (OUTPATIENT)
Dept: INTERNAL MEDICINE | Facility: CLINIC | Age: 75
End: 2025-05-15
Payer: COMMERCIAL

## 2025-05-15 VITALS
BODY MASS INDEX: 30.65 KG/M2 | SYSTOLIC BLOOD PRESSURE: 132 MMHG | HEIGHT: 63 IN | DIASTOLIC BLOOD PRESSURE: 82 MMHG | WEIGHT: 173 LBS | OXYGEN SATURATION: 98 % | RESPIRATION RATE: 18 BRPM | HEART RATE: 78 BPM | TEMPERATURE: 97 F

## 2025-05-15 DIAGNOSIS — E66.811 CLASS 1 OBESITY WITHOUT SERIOUS COMORBIDITY WITH BODY MASS INDEX (BMI) OF 30.0 TO 30.9 IN ADULT, UNSPECIFIED OBESITY TYPE: ICD-10-CM

## 2025-05-15 DIAGNOSIS — E22.2 SIADH (SYNDROME OF INAPPROPRIATE ADH PRODUCTION): ICD-10-CM

## 2025-05-15 DIAGNOSIS — E87.1 HYPONATREMIA: ICD-10-CM

## 2025-05-15 DIAGNOSIS — R00.2 PALPITATIONS: Primary | ICD-10-CM

## 2025-05-15 NOTE — PROGRESS NOTES
"Chief Complaint  Asthma (\"It's fine\" - meds causing \"palpitations\" //LABS FROM [facility] IN MEDIA FOR REVIEW) and Earache (Left ear x \"years\" )    Subjective        Lidia Allen presents to [facility]  Asthma  Associated symptoms include ear pain. Her past medical history is significant for asthma.   Earache      Paxlovid caused what sounds like skipped beats.      On CPAP with 14-20 cmH20.  Asthma well controlled as well.     Saw renal physician as well who wanted her to do Lasix, but patient is too worried about electrolytes, other side effects.     Objective   Vital Signs:  /82 (BP Location: Left arm, Patient Position: Sitting, Cuff Size: Adult)   Pulse 78   Temp 97 °F (36.1 °C) (Infrared)   Resp 18   Ht 160 cm (63\")   Wt 78.5 kg (173 lb)   SpO2 98%   BMI 30.65 kg/m²   Estimated body mass index is 30.65 kg/m² as calculated from the following:    Height as of this encounter: 160 cm (63\").    Weight as of this encounter: 78.5 kg (173 lb).            Physical Exam  Vitals reviewed.   Constitutional:       General: She is not in acute distress.     Appearance: Normal appearance.   HENT:      Head: Normocephalic and atraumatic.      Nose: Nose normal.      Mouth/Throat:      Mouth: Mucous membranes are moist.   Eyes:      Conjunctiva/sclera: Conjunctivae normal.   Pulmonary:      Effort: Pulmonary effort is normal.   Skin:     General: Skin is warm and dry.   Neurological:      General: No focal deficit present.      Mental Status: She is alert.   Psychiatric:         Mood and Affect: Mood normal.        Result Review :  Reviewed pulmonology note         Assessment and Plan   Diagnoses and all orders for this visit:    1. Palpitations (Primary)    2. Hyponatremia  -     Renal Function Panel  -     Osmolality, Serum    3. SIADH (syndrome of inappropriate ADH production)  -     Renal Function Panel  -     Osmolality, Serum    4. Class 1 obesity without serious comorbidity " with body mass index (BMI) of 30.0 to 30.9 in adult, unspecified obesity type      Working on ways to get stronger.  Discussed on lifting small weights and using youtube to find workouts.  Also recommended continued walking.     Repeat labs today to assess hyponatremia today.          Follow Up   Return in about 3 months (around 8/15/2025) for Medicare Wellness.  Patient was given instructions and counseling regarding her condition or for health maintenance advice. Please see specific information pulled into the AVS if appropriate.

## 2025-05-16 DIAGNOSIS — H60.392 OTHER INFECTIVE ACUTE OTITIS EXTERNA OF LEFT EAR: Primary | ICD-10-CM

## 2025-05-16 RX ORDER — NEOMYCIN SULFATE, POLYMYXIN B SULFATE, HYDROCORTISONE 3.5; 10000; 1 MG/ML; [USP'U]/ML; MG/ML
3 SOLUTION/ DROPS AURICULAR (OTIC) 4 TIMES DAILY
Qty: 10 ML | Refills: 0 | Status: SHIPPED | OUTPATIENT
Start: 2025-05-16

## 2025-05-17 LAB
ALBUMIN SERPL-MCNC: 4.3 G/DL (ref 3.8–4.8)
BUN SERPL-MCNC: 12 MG/DL (ref 8–27)
BUN/CREAT SERPL: 18 (ref 12–28)
CALCIUM SERPL-MCNC: 9.3 MG/DL (ref 8.7–10.3)
CHLORIDE SERPL-SCNC: 98 MMOL/L (ref 96–106)
CO2 SERPL-SCNC: 23 MMOL/L (ref 20–29)
CREAT SERPL-MCNC: 0.66 MG/DL (ref 0.57–1)
EGFRCR SERPLBLD CKD-EPI 2021: 92 ML/MIN/1.73
GLUCOSE SERPL-MCNC: 89 MG/DL (ref 70–99)
OSMOLALITY SERPL: 276 MOSMOL/KG (ref 280–301)
PHOSPHATE SERPL-MCNC: 3.9 MG/DL (ref 3–4.3)
POTASSIUM SERPL-SCNC: 4.3 MMOL/L (ref 3.5–5.2)
SODIUM SERPL-SCNC: 137 MMOL/L (ref 134–144)

## 2025-06-02 ENCOUNTER — TELEPHONE (OUTPATIENT)
Dept: INTERNAL MEDICINE | Facility: CLINIC | Age: 75
End: 2025-06-02
Payer: COMMERCIAL

## 2025-06-02 NOTE — TELEPHONE ENCOUNTER
Caller: Lidia Allen    Relationship to patient: Self    Best call back number: [phone]    Patient is needing: [employer] IS BEING SENT TO THE OFFICE FOR HER, SHE WILL ALSO NEED A PRINTED COPY FOR HER DAUGHTERS EMPLOYER, THEY DONT ACCEPT FAX. PLEASE ADVISE.

## 2025-06-30 ENCOUNTER — TELEPHONE (OUTPATIENT)
Dept: INTERNAL MEDICINE | Facility: CLINIC | Age: 75
End: 2025-06-30
Payer: COMMERCIAL

## 2025-07-02 ENCOUNTER — TELEPHONE (OUTPATIENT)
Dept: INTERNAL MEDICINE | Facility: CLINIC | Age: 75
End: 2025-07-02
Payer: COMMERCIAL

## 2025-07-02 DIAGNOSIS — E22.2 SIADH (SYNDROME OF INAPPROPRIATE ADH PRODUCTION): Primary | ICD-10-CM

## 2025-07-02 NOTE — TELEPHONE ENCOUNTER
Caller: Lidia Allen    Relationship: Self    Best call back number:     What orders are you requesting (i.e. lab or imaging): LABS(SODIUM, URINALYSIS, OSMOLALITY IN BLOOD AND URINE)    In what timeframe would the patient need to come in:     Where will you receive your lab/imaging services:     Additional notes: PATIENT IS CALLING IN TO ASK FOR ORDERS TO HAVE HER LABS DRAWN AGAIN TO CHECK FOR THE ITEMS LISTED ABOVE. PATIENT WANTS TO BE CALLED TO DISCUSS THIS IN DETAIL WITH THE OFFICE BEFORE THE ORDER IS COMPLETED. PATIENT SAYS SHE IS HAVING FREQUENT URINATION AT NIGHT AND IS PRODUCING A LOT OF URINE.

## 2025-07-08 ENCOUNTER — OFFICE VISIT (OUTPATIENT)
Age: 75
End: 2025-07-08
Payer: COMMERCIAL

## 2025-07-08 ENCOUNTER — OFFICE VISIT (OUTPATIENT)
Dept: INTERNAL MEDICINE | Facility: CLINIC | Age: 75
End: 2025-07-08
Payer: COMMERCIAL

## 2025-07-08 VITALS
HEART RATE: 70 BPM | BODY MASS INDEX: 30.3 KG/M2 | DIASTOLIC BLOOD PRESSURE: 80 MMHG | OXYGEN SATURATION: 99 % | SYSTOLIC BLOOD PRESSURE: 120 MMHG | WEIGHT: 171 LBS | HEIGHT: 63 IN

## 2025-07-08 VITALS
BODY MASS INDEX: 30.3 KG/M2 | TEMPERATURE: 98.2 F | WEIGHT: 171 LBS | SYSTOLIC BLOOD PRESSURE: 140 MMHG | DIASTOLIC BLOOD PRESSURE: 82 MMHG | HEART RATE: 74 BPM | OXYGEN SATURATION: 98 % | HEIGHT: 63 IN

## 2025-07-08 DIAGNOSIS — E87.1 HYPONATREMIA: ICD-10-CM

## 2025-07-08 DIAGNOSIS — E22.2 SIADH (SYNDROME OF INAPPROPRIATE ADH PRODUCTION): ICD-10-CM

## 2025-07-08 DIAGNOSIS — Z98.890 HISTORY OF LOOP RECORDER: ICD-10-CM

## 2025-07-08 DIAGNOSIS — R42 ORTHOSTATIC LIGHTHEADEDNESS: Primary | ICD-10-CM

## 2025-07-08 DIAGNOSIS — R00.2 PALPITATIONS: ICD-10-CM

## 2025-07-08 DIAGNOSIS — R35.0 FREQUENT URINATION: Primary | ICD-10-CM

## 2025-07-08 LAB
BILIRUB BLD-MCNC: NEGATIVE MG/DL
CLARITY, POC: CLEAR
COLOR UR: YELLOW
EXPIRATION DATE: ABNORMAL
GLUCOSE UR STRIP-MCNC: NEGATIVE MG/DL
KETONES UR QL: NEGATIVE
LEUKOCYTE EST, POC: NEGATIVE
Lab: ABNORMAL
NITRITE UR-MCNC: NEGATIVE MG/ML
PH UR: 8.5 [PH] (ref 5–8)
PROT UR STRIP-MCNC: NEGATIVE MG/DL
RBC # UR STRIP: ABNORMAL /UL
SP GR UR: 1.01 (ref 1–1.03)
UROBILINOGEN UR QL: NORMAL

## 2025-07-08 PROCEDURE — 93000 ELECTROCARDIOGRAM COMPLETE: CPT | Performed by: PHYSICIAN ASSISTANT

## 2025-07-08 PROCEDURE — 99214 OFFICE O/P EST MOD 30 MIN: CPT | Performed by: PHYSICIAN ASSISTANT

## 2025-07-08 NOTE — PROGRESS NOTES
Lidia Allen is a 74 y.o. female, who presents with a chief complaint of   Chief Complaint   Patient presents with    Urinary Frequency     Months volume increase has been cups 2 cups at a time at night    Rash           Urinary Frequency  Associated symptoms: frequency    Rash     Pt here bc she has had a rash that is itchy.  She has had it x 2 but the rash is better now.     She c/o urinary frequency and increased urinary volume for about a month.  She is fluid restricted bc of SIADH.  She can only have 5 cups of water a day per nephrology.  Pt sees dr. Shetty for nephrology.  Pt has had SABAS and hyponatremia in the past.  She is concerned about this again.     The following portions of the patient's history were reviewed and updated as appropriate: allergies, current medications, past family history, past medical history, past social history, past surgical history and problem list.    Allergies: Caffeine, Cephalexin, Cyclobenzaprine, Evista [raloxifene], Naproxen, Alendronate, Beta adrenergic blockers, Diltiazem, Ibuprofen, Ranitidine hcl, Sugar-protein-starch, Wheat extract, Aspirin, Clarithromycin, Levofloxacin, Paxlovid [nirmatrelvir-ritonavir], and Ranitidine    Review of Systems   Constitutional: Negative.    HENT: Negative.     Eyes: Negative.    Respiratory: Negative.     Cardiovascular: Negative.    Gastrointestinal: Negative.    Endocrine: Positive for polyuria.   Genitourinary:  Positive for frequency.   Musculoskeletal: Negative.    Skin:  Positive for rash.   Allergic/Immunologic: Negative.    Neurological: Negative.    Hematological: Negative.    Psychiatric/Behavioral: Negative.     All other systems reviewed and are negative.            Wt Readings from Last 3 Encounters:   07/08/25 77.6 kg (171 lb)   07/08/25 77.6 kg (171 lb)   05/15/25 78.5 kg (173 lb)     Temp Readings from Last 3 Encounters:   07/08/25 98.2 °F (36.8 °C) (Infrared)   05/15/25 97 °F (36.1 °C) (Infrared)   03/18/25  98.4 °F (36.9 °C) (Infrared)     BP Readings from Last 3 Encounters:   07/08/25 140/82   07/08/25 120/80   05/15/25 132/82     Pulse Readings from Last 3 Encounters:   07/08/25 74   07/08/25 70   05/15/25 78     Body mass index is 30.3 kg/m².  SpO2 Readings from Last 3 Encounters:   07/08/25 98%   07/08/25 99%   05/15/25 98%          Physical Exam  Vitals and nursing note reviewed.   Constitutional:       General: She is not in acute distress.     Appearance: She is well-developed.   HENT:      Head: Normocephalic and atraumatic.      Right Ear: External ear normal.      Left Ear: External ear normal.      Nose: Nose normal.   Eyes:      Conjunctiva/sclera: Conjunctivae normal.      Pupils: Pupils are equal, round, and reactive to light.   Cardiovascular:      Rate and Rhythm: Normal rate and regular rhythm.      Heart sounds: Normal heart sounds.   Pulmonary:      Effort: Pulmonary effort is normal. No respiratory distress.      Breath sounds: Normal breath sounds. No wheezing.   Musculoskeletal:         General: Normal range of motion.      Cervical back: Normal range of motion and neck supple.      Comments: Normal gait   Skin:     General: Skin is warm and dry.   Neurological:      Mental Status: She is alert and oriented to person, place, and time.   Psychiatric:         Behavior: Behavior normal.         Thought Content: Thought content normal.         Judgment: Judgment normal.         Results for orders placed or performed in visit on 07/08/25   POCT urinalysis dipstick, automated    Collection Time: 07/08/25 11:52 AM    Specimen: Urine   Result Value Ref Range    Color Yellow Yellow, Straw, Dark Yellow, Julianna    Clarity, UA Clear Clear    Specific Gravity  1.015 1.005 - 1.030    pH, Urine 8.5 (A) 5.0 - 8.0    Leukocytes Negative Negative    Nitrite, UA Negative Negative    Protein, POC Negative Negative mg/dL    Glucose, UA Negative Negative mg/dL    Ketones, UA Negative Negative    Urobilinogen, UA Normal  Normal, 0.2 E.U./dL    Bilirubin Negative Negative    Blood, UA Trace (A) Negative    Lot Number [id number]     Expiration Date 10-5-26      Result Review :   The following data was reviewed by: Mell Frances MD on 07/08/2025:    Data reviewed : Consultant notes nephrology notes from dr. hidalgo           Assessment and Plan    Diagnoses and all orders for this visit:    1. Frequent urination (Primary)  -     POCT urinalysis dipstick, automated  -     Osmolality, Urine - Urine, Clean Catch  -     Sodium, Urine, Random - Urine, Clean Catch  -     Protein / Creatinine Ratio, Urine - Urine, Clean Catch  -     CBC & Differential  -     Comprehensive Metabolic Panel  -     Osmolality, Serum    2. SIADH (syndrome of inappropriate ADH production)  -     Osmolality, Urine - Urine, Clean Catch  -     Sodium, Urine, Random - Urine, Clean Catch  -     Protein / Creatinine Ratio, Urine - Urine, Clean Catch  -     CBC & Differential  -     Comprehensive Metabolic Panel  -     Osmolality, Serum    3. Hyponatremia  -     Osmolality, Urine - Urine, Clean Catch  -     Sodium, Urine, Random - Urine, Clean Catch  -     Protein / Creatinine Ratio, Urine - Urine, Clean Catch  -     CBC & Differential  -     Comprehensive Metabolic Panel  -     Osmolality, Serum       Cont fluid restriction and recheck labs                Outpatient Medications Prior to Visit   Medication Sig Dispense Refill    albuterol sulfate  (90 Base) MCG/ACT inhaler Inhale 2 puffs Every 4 (Four) Hours As Needed for Wheezing or Shortness of Air. 18 g 8    ascorbic acid (VITAMIN C) 250 MG tablet Take 1 tablet by mouth Daily.      aspirin 81 MG EC tablet Take 1 tablet by mouth Daily. Takes 1 tablet about every 3 days (Patient taking differently: Take 1 tablet by mouth Daily. Takes one tablet every 3 weeks or as needed)      b complex vitamins capsule Take 1 capsule by mouth.      CALCIUM PO Take 720 mg by mouth Daily.      cholecalciferol (Vitamin D,  Cholecalciferol,) 25 MCG (1000 UT) tablet Take 5 tablets by mouth Daily.      albuterol (PROVENTIL) (2.5 MG/3ML) 0.083% nebulizer solution Take 2.5 mg by nebulization Every 4 (Four) Hours As Needed for Wheezing. (Patient not taking: Reported on 7/8/2025) 3 mL 12    neomycin-polymyxin-hydrocortisone (CORTISPORIN) 3.5-21718-8 otic solution Administer 3 drops into both ears 4 (Four) Times a Day. Use for 5 days. (Patient not taking: Reported on 7/8/2025) 10 mL 0     No facility-administered medications prior to visit.     No orders of the defined types were placed in this encounter.    [unfilled]  Medications Discontinued During This Encounter   Medication Reason    neomycin-polymyxin-hydrocortisone (CORTISPORIN) 3.5-56789-2 otic solution *Therapy completed    albuterol (PROVENTIL) (2.5 MG/3ML) 0.083% nebulizer solution *Therapy completed         No follow-ups on file.    Patient was given instructions and counseling regarding her condition or for health maintenance advice. Please see specific information pulled into the AVS if appropriate.

## 2025-07-08 NOTE — PROGRESS NOTES
"    CARDIOLOGY        Patient Name: Lidia Allen  :1950  Age: 74 y.o.  Primary Cardiologist: Candelario Parisi MD  Encounter Provider:  Kofi Camacho PA-C    Date of Service: 25            CHIEF COMPLAINT / REASON FOR OFFICE VISIT     Follow-up      HISTORY OF PRESENT ILLNESS       HPI  Lidia Allen is a 74 y.o. female who presents today for follow-up.     Pt has a  history significant for palpitations, orthostatic lightheadedness, atrial tachycardia, CVA, and GERD presents for possible surgical site infection.  Patient has a history of palpitations and prior CVA.  She had a prior \"placement in the past.  She was sent to Dr. Yeager for loop recorder explant.  She had her loop recorder removed on 2024.     On 25 she was doing well after procedure.  She states over the past few days she has noticed some redness and soreness to the area.  Patient's had some itchiness.  No fevers.  No drainage.  Patient has tried topical ointment on there states has helped.  She was placed on amoxicillin at this time.  She was to follow-up with Dr. Yeager.    Patient was seen in office on 2025 where she had dizziness.  She had been previously worked up with ENT.  She had no medication change at that time was to follow-up in office.    Patient mentions she had COVID and started having different palpitations.  She was treated with Paxlovid not sure if it was related to COVID or Paxlovid.  Since having COVID few months ago she has had daily palpitations that are short-lived.  She does have stronger sensation of palpitations that occur every 3 days.  She feels that she gets short of breath with these.  No chest discomfort, lightheadedness, dizziness, or fatigue.  Patient denies excessive caffeine use.    The following portions of the patient's history were reviewed and updated as appropriate: allergies, current medications, past family history, past medical history, past social history, past " "surgical history and problem list.      VITAL SIGNS     Visit Vitals  /80 (BP Location: Left arm, Patient Position: Sitting, Cuff Size: Adult)   Pulse 70   Ht 160 cm (63\")   Wt 77.6 kg (171 lb)   SpO2 99%   BMI 30.29 kg/m²       @[email or web address]  Wt Readings from Last 3 Encounters:   07/08/25 77.6 kg (171 lb)   05/15/25 78.5 kg (173 lb)   03/18/25 81.2 kg (179 lb)     Body mass index is 30.29 kg/m².        PHYSICAL EXAMINATION     Constitutional:       General: Awake. Not in acute distress.     Appearance: Not in distress.   Pulmonary:      Effort: Pulmonary effort is normal.      Breath sounds: Normal breath sounds.   Cardiovascular:      Normal rate. Regular rhythm.      Murmurs: There is no murmur.   Skin:     General: Skin is warm.   Neurological:      Mental Status: Alert.   Psychiatric:         Behavior: Behavior is cooperative.           REVIEWED DATA       ECG 12 Lead    Date/Time: 7/8/2025 10:31 AM  Performed by: Kofi Camacho PA-C    Authorized by: Kofi Camacho PA-C  Comparison: compared with previous ECG   Similar to previous ECG  Rhythm: sinus rhythm  Ectopy: atrial premature contractions  Rate: normal  BPM: 70  QRS axis: normal    Clinical impression: normal ECG          Cardiac Procedures:    Holter monitor on 7/5/2024  Interpretation Summary       An abnormal monitor study.    There were 17 episodes of supraventricular tachycardia. 8 beats of SVT on 6/18/2024 at 1:15 PM, heart 164 bpm, lasting 3.2 seconds, no symptoms. 4 beats of SVT on 6/20/2024 11:10 AM, heart 178 bpm, lasting 1.7 seconds, no symptoms. 69 beats of SVT on 6/18/2024 3:41 PM, heart rate averaging 154 bpm, lasting 27.2 seconds, likely atrial tachycardia or AVNRT, no symptoms. No atrial fibrillation seen..     Transthoracic echo on 12/19/2023  Interpretation Summary       : Left ventricular systolic function is normal. Calculated left ventricular EF = 61% Normal left ventricular cavity size and wall thickness " noted. All left ventricular wall segments contract normally. Left ventricular diastolic function is consistent with (grade I) impaired relaxation.     Ultrasound carotid bilaterally on 6/13/2023  IMPRESSION:  1. Normal carotid Doppler ultrasound examination.  2. No Doppler ultrasound evidence of hemodynamically significant carotid  stenosis  3.  2.2 cm right thyroid lobe nodule. Thyroid ultrasound recommended              Lab Results   Component Value Date     05/15/2025     01/15/2025    K 4.3 05/15/2025    K 4.5 01/15/2025    CL 98 05/15/2025     01/15/2025    CO2 23 05/15/2025    CO2 27.7 01/15/2025    BUN 12 05/15/2025    BUN 20 01/15/2025    CREATININE 0.66 05/15/2025    CREATININE 0.65 01/15/2025    EGFRIFNONA 91 10/16/2021    EGFRIFNONA 90 08/03/2021    EGFRIFAFRI >60 02/08/2022    EGFRIFAFRI >60 02/22/2021    GLUCOSE 89 05/15/2025    GLUCOSE 105 (H) 01/15/2025    CALCIUM 9.3 05/15/2025    CALCIUM 9.4 01/15/2025    ALBUMIN 4.3 05/15/2025    ALBUMIN 4.4 01/15/2025    BILITOT 0.4 01/15/2025    BILITOT 0.3 10/01/2024    AST 18 01/15/2025    AST 17 10/01/2024    ALT 20 01/15/2025    ALT 22 10/01/2024     Lab Results   Component Value Date    WBC 7.13 01/15/2025    WBC 8.64 06/12/2024    HGB 15.3 01/15/2025    HGB 14.6 06/12/2024    HCT 44.7 01/15/2025    HCT 43.9 06/12/2024    MCV 92.7 01/15/2025    MCV 92.6 06/12/2024     01/15/2025     06/12/2024     Lab Results   Component Value Date    PROBNP 50.0 12/06/2023    PROBNP 55.3 02/08/2021    BNP 25.6 06/12/2024    BNP 94.8 02/22/2021     Lab Results   Component Value Date    TROPONINI <0.010 06/12/2024    TROPONINT 10 02/17/2024     Lab Results   Component Value Date    TSH 1.860 05/06/2024    TSH 2.700 02/04/2020             ASSESSMENT & PLAN     Diagnoses and all orders for this visit:    Palpitations: Previously been attributed to PACs.   There were no arrhythmia correlates, she wore a 14-day Zio patch in January 2023 given her  ongoing complaints and the fact that the loop recorder was not picking up any significant episodes.  Did reveal short, nonsustained atrial tachycardia episodes lasting at most 14 seconds, but these were infrequent, and not symptomatic.  PAC burden less than 1%..  Short nonsustained SVT episodes likely atrial tachycardia, 27 seconds in duration but did not correlate with symptoms.  Loop recorder was explanted.    She feels her palpitations are different now since having COVID and will write out a 7-day Zio monitor.  Dyspnea on exertion: Intermittent, atypical episodes.    She has undergone a comprehensive workup with normal echocardiograms,   normal BNP, there is report of impaired relaxation diastolic function but on my evaluation appears her diastolic function is normal.     Reviewed her cardiac catheterization from 2019 which revealed normal coronary arteries.   Positional lightheadedness.  No complaints of this today in office.    Patient complains mostly of palpitations along with some occasional shortness of breath that are different than her previous palpitations.  These are short-lived in nature.  I will get a 7-day monitor to assess.  I will call her with the results otherwise we will arrange follow-up with Dr. Parisi in 6 months.      Return in about 6 months (around 1/8/2026) for Dr. Parisi.    Future Appointments         Provider Department Center    7/8/2025 11:45 AM (Arrive by 11:30 AM) Mell Frances MD [facility]    8/18/2025 9:00 AM (Arrive by 8:45 AM) Keshia Perry MD [facility]    1/14/2026 9:30 AM (Arrive by 9:15 AM) Candelario Parisi MD [facility]ARDIOLOG[facility]                MEDICATIONS         Discharge Medications            Accurate as of July 8, 2025 10:40 AM. If you have any questions, ask your nurse or doctor.                Changes to Medications        Instructions Start Date   aspirin 81  MG EC tablet  What changed: additional instructions   81 mg, Daily             Continue These Medications        Instructions Start Date   albuterol sulfate  (90 Base) MCG/ACT inhaler  Commonly known as: PROVENTIL HFA;VENTOLIN HFA;PROAIR HFA   2 puffs, Inhalation, Every 4 Hours PRN      albuterol (2.5 MG/3ML) 0.083% nebulizer solution  Commonly known as: PROVENTIL   2.5 mg, Nebulization, Every 4 Hours PRN      ascorbic acid 250 MG tablet  Commonly known as: VITAMIN C   250 mg, Daily      b complex vitamins capsule   1 capsule      CALCIUM PO   720 mg, Daily      cholecalciferol 25 MCG (1000 UT) tablet  Commonly known as: VITAMIN D3   5,000 Units, Daily      neomycin-polymyxin-hydrocortisone 3.5-62772-6 otic solution  Commonly known as: CORTISPORIN   3 drops, Both Ears, 4 Times Daily, Use for 5 days.                   **Dragon Disclaimer:   Much of this encounter note is an electronic transcription/translation of spoken language to printed text. The electronic translation of spoken language may permit erroneous, or at times, nonsensical words or phrases to be inadvertently transcribed. Although I have reviewed the note for such errors, some may still exist.

## 2025-07-11 LAB
ALBUMIN SERPL-MCNC: 4.3 G/DL (ref 3.8–4.8)
ALP SERPL-CCNC: 63 IU/L (ref 44–121)
ALT SERPL-CCNC: 15 IU/L (ref 0–32)
AST SERPL-CCNC: 15 IU/L (ref 0–40)
BASOPHILS # BLD AUTO: 0 X10E3/UL (ref 0–0.2)
BASOPHILS NFR BLD AUTO: 0 %
BILIRUB SERPL-MCNC: 0.3 MG/DL (ref 0–1.2)
BUN SERPL-MCNC: 13 MG/DL (ref 8–27)
BUN/CREAT SERPL: 21 (ref 12–28)
CALCIUM SERPL-MCNC: 9.1 MG/DL (ref 8.7–10.3)
CHLORIDE SERPL-SCNC: 101 MMOL/L (ref 96–106)
CO2 SERPL-SCNC: 23 MMOL/L (ref 20–29)
CREAT SERPL-MCNC: 0.63 MG/DL (ref 0.57–1)
CREAT UR-MCNC: 26 MG/DL
EGFRCR SERPLBLD CKD-EPI 2021: 93 ML/MIN/1.73
EOSINOPHIL # BLD AUTO: 0.3 X10E3/UL (ref 0–0.4)
EOSINOPHIL NFR BLD AUTO: 4 %
ERYTHROCYTE [DISTWIDTH] IN BLOOD BY AUTOMATED COUNT: 13.4 % (ref 11.7–15.4)
GLOBULIN SER CALC-MCNC: 2.3 G/DL (ref 1.5–4.5)
GLUCOSE SERPL-MCNC: 123 MG/DL (ref 70–99)
HCT VFR BLD AUTO: 45.6 % (ref 34–46.6)
HGB BLD-MCNC: 14.9 G/DL (ref 11.1–15.9)
IMM GRANULOCYTES # BLD AUTO: 0 X10E3/UL (ref 0–0.1)
IMM GRANULOCYTES NFR BLD AUTO: 0 %
LYMPHOCYTES # BLD AUTO: 2.3 X10E3/UL (ref 0.7–3.1)
LYMPHOCYTES NFR BLD AUTO: 38 %
MCH RBC QN AUTO: 31.6 PG (ref 26.6–33)
MCHC RBC AUTO-ENTMCNC: 32.7 G/DL (ref 31.5–35.7)
MCV RBC AUTO: 97 FL (ref 79–97)
MONOCYTES # BLD AUTO: 0.5 X10E3/UL (ref 0.1–0.9)
MONOCYTES NFR BLD AUTO: 8 %
NEUTROPHILS # BLD AUTO: 2.9 X10E3/UL (ref 1.4–7)
NEUTROPHILS NFR BLD AUTO: 50 %
OSMOLALITY SERPL: 283 MOSMOL/KG (ref 280–301)
OSMOLALITY UR: 435 MOSMOL/KG
PLATELET # BLD AUTO: 246 X10E3/UL (ref 150–450)
POTASSIUM SERPL-SCNC: 4.3 MMOL/L (ref 3.5–5.2)
PROT SERPL-MCNC: 6.6 G/DL (ref 6–8.5)
PROT UR-MCNC: 4.4 MG/DL
PROT/CREAT UR: 169 MG/G CREAT (ref 0–200)
RBC # BLD AUTO: 4.71 X10E6/UL (ref 3.77–5.28)
SODIUM SERPL-SCNC: 138 MMOL/L (ref 134–144)
SODIUM UR-SCNC: 82 MMOL/L
WBC # BLD AUTO: 6 X10E3/UL (ref 3.4–10.8)

## 2025-07-14 ENCOUNTER — HOSPITAL ENCOUNTER (EMERGENCY)
Facility: HOSPITAL | Age: 75
Discharge: HOME | End: 2025-07-14
Attending: EMERGENCY MEDICINE | Admitting: EMERGENCY MEDICINE
Payer: COMMERCIAL

## 2025-07-14 ENCOUNTER — APPOINTMENT (OUTPATIENT)
Dept: GENERAL RADIOLOGY | Facility: HOSPITAL | Age: 75
End: 2025-07-14
Payer: COMMERCIAL

## 2025-07-14 VITALS
HEIGHT: 63 IN | DIASTOLIC BLOOD PRESSURE: 89 MMHG | BODY MASS INDEX: 29.95 KG/M2 | HEART RATE: 75 BPM | OXYGEN SATURATION: 98 % | SYSTOLIC BLOOD PRESSURE: 135 MMHG | WEIGHT: 169 LBS | RESPIRATION RATE: 18 BRPM | TEMPERATURE: 98.9 F

## 2025-07-14 DIAGNOSIS — M54.50 ACUTE RIGHT-SIDED LOW BACK PAIN WITHOUT SCIATICA: ICD-10-CM

## 2025-07-14 DIAGNOSIS — S39.012A ACUTE MYOFASCIAL STRAIN OF LUMBAR REGION, INITIAL ENCOUNTER: Primary | ICD-10-CM

## 2025-07-14 PROCEDURE — 72100 X-RAY EXAM L-S SPINE 2/3 VWS: CPT

## 2025-07-14 PROCEDURE — 99283 EMERGENCY DEPT VISIT LOW MDM: CPT | Performed by: EMERGENCY MEDICINE

## 2025-07-14 RX ORDER — PREDNISONE 20 MG/1
60 TABLET ORAL ONCE
Status: DISCONTINUED | OUTPATIENT
Start: 2025-07-14 | End: 2025-07-14 | Stop reason: HOSPADM

## 2025-07-14 RX ORDER — DIAZEPAM 5 MG/1
5 TABLET ORAL EVERY 6 HOURS PRN
Qty: 12 TABLET | Refills: 0 | Status: SHIPPED | OUTPATIENT
Start: 2025-07-14

## 2025-07-14 RX ORDER — PREDNISONE 20 MG/1
TABLET ORAL
Qty: 12 TABLET | Refills: 0 | Status: SHIPPED | OUTPATIENT
Start: 2025-07-14

## 2025-07-14 RX ORDER — DIAZEPAM 5 MG/1
5 TABLET ORAL ONCE
Status: DISCONTINUED | OUTPATIENT
Start: 2025-07-14 | End: 2025-07-14 | Stop reason: HOSPADM

## 2025-07-14 RX ORDER — PREDNISONE 20 MG/1
TABLET ORAL
Status: DISCONTINUED
Start: 2025-07-14 | End: 2025-07-14 | Stop reason: HOSPADM

## 2025-07-14 NOTE — Clinical Note
[facility] EMERGENCY DEPARTMENT  [address] [address]  Phone: [phone]    LALO SALAZAR accompanied Lidia Allen to the emergency department on 7/14/2025. They may return to work on 07/15/2025.        Thank you for choosing [facility].    Gustavo Trejo MD

## 2025-07-14 NOTE — ED NOTES
Pt refuses medication,  states she has had issues with both meds.   Unable to elaborate but does not want to take them.

## 2025-07-14 NOTE — ED PROVIDER NOTES
Subjective   History of Present Illness  Patient presents complaining of lower back pain that started 2 days ago.  Patient was bent over and was pulling up his compression stockings.  Patient had got them mostly up her leg past her knee and she was pulling them at her thigh and felt a sudden sharp pain in her low back.  Patient says it felt like her low back was pulling from her spine and felt that shoot more to the right.  Patient denies any radiation going down her legs or into her arms.  Patient is able to lay down and get into a position of comfort but any type of adjustment, twisting, bending, standing or walking make it worse.  No therapy taken prior to arrival.  Patient has been doing heat on her low back.  Patient has also noted that in the past she has had some urinary issues where she can feel it coming on and has difficulty controlling it.  Patient says over the last 2 days she has had 2 episodes where she has not been able to get to the bathroom in time.  Patient does feel it coming on.  Patient denies any numbness or sensory loss in her legs and no weakness in her legs.  Patient still having baseline regular bowel movements and nothing is changed there.  Patient reports that she had a low back injury 2 or 3 years ago and was evaluated but they could not find anything.  Since then patient's had back pain on and off.  Patient also mentioned that she has been having symptoms where she will have some tingling in her feet but this is been going on for months prior to this injury.  Patient also says sometimes she will wake up and her arm will be asleep from her elbow distally.  Again this has been happening for months prior to her injury.      Review of Systems   All other systems reviewed and are negative.      Past Medical History:   Diagnosis Date    Abnormal ECG     Many abnormal EKGs over the years.    SABAS (acute kidney injury)     2019    Allergic     Arrhythmia     Asthma     Have to look the date up  "   Atrial fibrillation     Brief episodes only.    Deep vein thrombosis 2019    Cerebellar stroke 2019    Diastolic dysfunction     Dizziness     GERD (gastroesophageal reflux disease) In the past.    Much improved    Headache 2024    vertebral?    Heart valve disease     mild regurgitation    Hyperlipidemia     Hypertension Over a year ago.    Elevated BP disappeared after diet from Dr. Jenkins, [facility]. Vegan/No Oil Diet.    Lactose intolerance Years    Too sweet    Low back pain 2023    osteoporosis    Near syncope     Obesity 2023    Various    Orthostatic hypotension     Osteopenia     Don’t know date    Osteoporosis     PVC (premature ventricular contraction)     Scoliosis     Have to look up    Sleep apnea     wears C-Pap @ bedtime    Stroke     Tremor     legs. Don’t know why    Visual impairment 2024    Getting worse       Allergies   Allergen Reactions    Caffeine Palpitations and Other (See Comments)     SVT...    Cephalexin Palpitations and Other (See Comments)    Cyclobenzaprine Unknown - Low Severity and Other (See Comments)     Wakes up \"gasping for air\"    Evista [Raloxifene] Other (See Comments)     Blood Clots    Naproxen Other (See Comments) and Palpitations     Patient wakes up \"gasping for air\".  Kidney injury    Alendronate Rash    Beta Adrenergic Blockers Other (See Comments)     Breathing problems      Diltiazem Other (See Comments)     Pt reports more dysrhythmias with this medication, but she took 40mg this AM.      Ibuprofen Other (See Comments)    Ranitidine Hcl Other (See Comments)    Sugar-Protein-Starch Unknown - Low Severity    Wheat Extract GI Intolerance     Other reaction(s): GI Intolerance, Stomach Upset    Aspirin Rash and Other (See Comments)    Clarithromycin Palpitations    Levofloxacin Other (See Comments) and Rash    Paxlovid [Nirmatrelvir-Ritonavir] Palpitations    Ranitidine Other (See Comments)     Patient doesn't know if she has a reaction to this medication, " she was taking this with the other medications that were causing her issues.       Past Surgical History:   Procedure Laterality Date    CARDIAC CATHETERIZATION      See report    CARDIAC ELECTROPHYSIOLOGY PROCEDURE N/A 2024    Procedure: Loop recorder removal;  Surgeon: Santiago Yeager MD;  Location: [facility] INVASIVE LOCATION;  Service: Cardiovascular;  Laterality: N/A;  loop removal    CARPAL TUNNEL RELEASE      DENTAL PROCEDURE      crown lenthening       Family History   Problem Relation Age of Onset    Alzheimer's disease Father     Supraventricular tachycardia Father     Arrhythmia Father         Racing heart is all I was told    Diabetes Mother     Arthritis Mother     Asthma Sister         Asthma    Kidney disease Brother          from kidney failure    Colon cancer Neg Hx     Colon polyps Neg Hx     Liver cancer Neg Hx     Stomach cancer Neg Hx     Breast cancer Neg Hx     Ovarian cancer Neg Hx     Uterine cancer Neg Hx        Social History     Socioeconomic History    Marital status:    Tobacco Use    Smoking status: Never     Passive exposure: Never    Smokeless tobacco: Never   Vaping Use    Vaping status: Never Used   Substance and Sexual Activity    Alcohol use: Never    Drug use: Never    Sexual activity: Not Currently           Objective   Physical Exam  Vitals and nursing note reviewed.   Constitutional:       Comments: Patient lying in bed comfortably, talkative, friendly, no signs of distress.  Cooperative with exam.   HENT:      Head: Normocephalic.      Mouth/Throat:      Pharynx: Oropharynx is clear.   Eyes:      Conjunctiva/sclera: Conjunctivae normal.   Neck:      Comments: No cervical, thoracic, or lumbar spinal tenderness to palpation.  Cardiovascular:      Rate and Rhythm: Normal rate and regular rhythm.      Heart sounds: Normal heart sounds.   Pulmonary:      Effort: Pulmonary effort is normal.      Breath sounds: Normal breath sounds.   Musculoskeletal:          General: No swelling or tenderness.      Cervical back: Neck supple.        Back:       Comments: Stiffness and tenderness to circled area.  No external signs of redness or bruising.   Skin:     General: Skin is warm and dry.      Capillary Refill: Capillary refill takes 2 to 3 seconds.   Neurological:      Mental Status: She is alert and oriented to person, place, and time.      Sensory: No sensory deficit.      Motor: No weakness.   Psychiatric:         Mood and Affect: Mood normal.         Behavior: Behavior normal.         Procedures           ED Course                                               DEENA reviewed by Gustavo Trejo MD       Medical Decision Making  Ddx sprain, strain, tendon tear, bulging disc, disc herniation, spinal stenosis, disc degenerative disease, muscle spasm    0950 Did extensive counseling and answering questions with patient.  Patient had called her insurance prior to coming in and was told that the ED could order an MRI and it would only be $200.  I explained to the patient that typically without any neurologic loss we do not go straight to an MRI and usually advise treatments with muscle relaxers and anti-inflammatories.  Sometimes we also advise physical therapy.  I did advise patient to make a follow-up appointment with her doctor in the next week in case she is not getting better.  Also based on patient's injury pattern and no radiation, it is unlikely that she herniated a disc and with her pain being reduced while laying and then worsened by activating her low back it sounds more typical of a muscular strain/sprain.    XR Spine Lumbar 2 or 3 View  Result Date: 7/14/2025  1.No evidence for acute fracture or subluxation. No evidence for compression fracture deformity. 2.Mild discogenic degenerative changes and degenerative posterior facet changes are seen throughout the lumbar spine. Electronically Signed: Cristhian Quinn MD  7/14/2025 11:01 AM EDT  Workstation ID:  [id number]    1105 Pt seen again prior to d/c.  Imaging reviewed and are unremarkable.  Patient starting to feel better and more loose but refused both the steroid and the Valium.  Discussed with patient should she have any pain past 1 to 2 weeks she needs to follow-up with her primary care doctor or a spine surgeon to have additional evaluation such as physical therapy or advanced imaging.  All questions personally answered at the bedside and all d/c instructions personally reviewed with pt.  Discussed the importance of close outpt. f/u and pt. understands this and agrees to do so.  Pt agrees to return to ED immediately for any new, persistent, or worsening symptoms.  Patient's family member was present for the entire evaluation and discharge instructions.    EMR Dragon/Transcription disclaimer:  Much of this encounter note is an electronic transcription/translation of spoken language to printed text using the Dragon Dictation System       Problems Addressed:  Acute myofascial strain of lumbar region, initial encounter: complicated acute illness or injury  Acute right-sided low back pain without sciatica: complicated acute illness or injury    Amount and/or Complexity of Data Reviewed  Radiology: ordered.    Risk  Prescription drug management.        Final diagnoses:   Acute myofascial strain of lumbar region, initial encounter   Acute right-sided low back pain without sciatica       ED Disposition  ED Disposition       ED Disposition   Discharge    Condition   Stable    Comment   --               Keshia Perry MD  [facility] [address] [id number] [phone]    In 1 week  If symptoms worsen         Medication List        New Prescriptions      diazePAM 5 MG tablet  Commonly known as: VALIUM  Take 1 tablet by mouth Every 6 (Six) Hours As Needed for Muscle Spasms (back pain).     predniSONE 20 MG tablet  Commonly known as: DELTASONE  Take 3 tabs p.o. daily on days 1-2, then 2 tabs p.o. on days  3-4, then 1 tab p.o. on days 5-6. You had your first dose in the ED so you will not need to start this prescription until 7/15/25            Changed      aspirin 81 MG EC tablet  What changed: additional instructions               Where to Get Your Medications        These medications were sent to [facility] [address] [place] [address] - [address] [place], [place] [id number] [facility] - [phone]  - [phone] [address] [address]      Phone: [phone]   diazePAM 5 MG tablet  predniSONE 20 MG tablet            Gustavo Trejo MD  07/14/25 8905

## 2025-07-15 ENCOUNTER — TELEPHONE (OUTPATIENT)
Dept: INTERNAL MEDICINE | Facility: CLINIC | Age: 75
End: 2025-07-15

## 2025-07-18 ENCOUNTER — TELEPHONE (OUTPATIENT)
Dept: NEUROLOGY | Facility: CLINIC | Age: 75
End: 2025-07-18
Payer: COMMERCIAL

## 2025-07-18 NOTE — TELEPHONE ENCOUNTER
Called pt and discussed that she should call her neuro office to schedule a f/u. If they are unable to help reach out to PCP for referral. Pt v/u.

## 2025-07-18 NOTE — TELEPHONE ENCOUNTER
"Caller: Lidia Allen    Relationship: Self    Best call back number: [phone]    What is the best time to reach you: ANY TIME    What was the call regarding: PT CALLED IN BECAUSE THEY WERE RECENTLY IN THE ER FOR NUMBNESS, AND PT WOULD LIEK TO BE SEEN IN OUR OFFICE. ADVISED WE REQUIRE A REFERRAL, BUT PT WAS INSISTENT THAT HER PCP \"DID NOT WANT TO HELP HER\" TO GET ONE. PLEASE REVIEW ER RECORDS AND ADVISE IF WE CAN SCHEDULE AN APPT WITH PT. PT ALSO HAS PAIN IN BOTTOM OF FEET AND BACK OF RIGHT LEG, AND THEY FEEL IT HAS BEEN GETTING WORSE.    PLEASE REVIEW AND ADVISE  "

## 2025-07-23 ENCOUNTER — HOSPITAL ENCOUNTER (OUTPATIENT)
Dept: CARDIOLOGY | Facility: HOSPITAL | Age: 75
Discharge: HOME | End: 2025-07-23
Admitting: PHYSICIAN ASSISTANT
Payer: COMMERCIAL

## 2025-07-23 DIAGNOSIS — E22.2 SIADH (SYNDROME OF INAPPROPRIATE ADH PRODUCTION): ICD-10-CM

## 2025-07-23 DIAGNOSIS — R42 ORTHOSTATIC LIGHTHEADEDNESS: ICD-10-CM

## 2025-07-23 DIAGNOSIS — R00.2 PALPITATIONS: ICD-10-CM

## 2025-07-23 DIAGNOSIS — Z98.890 HISTORY OF LOOP RECORDER: ICD-10-CM

## 2025-07-23 PROCEDURE — 93242 EXT ECG>48HR<7D RECORDING: CPT

## 2025-07-25 ENCOUNTER — OFFICE VISIT (OUTPATIENT)
Dept: INTERNAL MEDICINE | Facility: CLINIC | Age: 75
End: 2025-07-25
Payer: COMMERCIAL

## 2025-07-25 VITALS
BODY MASS INDEX: 29.88 KG/M2 | OXYGEN SATURATION: 98 % | SYSTOLIC BLOOD PRESSURE: 130 MMHG | HEART RATE: 77 BPM | TEMPERATURE: 98.6 F | DIASTOLIC BLOOD PRESSURE: 80 MMHG | WEIGHT: 168.6 LBS | HEIGHT: 63 IN | RESPIRATION RATE: 16 BRPM

## 2025-07-25 DIAGNOSIS — M47.26 OSTEOARTHRITIS OF SPINE WITH RADICULOPATHY, LUMBAR REGION: ICD-10-CM

## 2025-07-25 DIAGNOSIS — R35.0 URINARY FREQUENCY: Primary | ICD-10-CM

## 2025-07-25 LAB
BILIRUB BLD-MCNC: NEGATIVE MG/DL
CLARITY, POC: CLEAR
COLOR UR: YELLOW
EXPIRATION DATE: ABNORMAL
GLUCOSE UR STRIP-MCNC: NEGATIVE MG/DL
KETONES UR QL: NEGATIVE
LEUKOCYTE EST, POC: NEGATIVE
Lab: ABNORMAL
NITRITE UR-MCNC: NEGATIVE MG/ML
PH UR: 7 [PH] (ref 5–8)
PROT UR STRIP-MCNC: NEGATIVE MG/DL
RBC # UR STRIP: ABNORMAL /UL
SP GR UR: 1.01 (ref 1–1.03)
UROBILINOGEN UR QL: ABNORMAL

## 2025-07-25 PROCEDURE — 1159F MED LIST DOCD IN RCRD: CPT | Performed by: INTERNAL MEDICINE

## 2025-07-25 PROCEDURE — 1125F AMNT PAIN NOTED PAIN PRSNT: CPT | Performed by: INTERNAL MEDICINE

## 2025-07-25 PROCEDURE — 99214 OFFICE O/P EST MOD 30 MIN: CPT | Performed by: INTERNAL MEDICINE

## 2025-07-25 PROCEDURE — 1160F RVW MEDS BY RX/DR IN RCRD: CPT | Performed by: INTERNAL MEDICINE

## 2025-07-25 PROCEDURE — 81003 URINALYSIS AUTO W/O SCOPE: CPT | Performed by: INTERNAL MEDICINE

## 2025-07-25 NOTE — PROGRESS NOTES
Lidia Allen is a 74 y.o. female, who presents with a chief complaint of   Chief Complaint   Patient presents with    Back Pain     ER follow up / Low back pain            HPI   Pt here for f/u.  She has had urinary incontinence x 2.  She feels like she is having more urinary freqyency.  She was taking vit e instead of vit d so she went to the ED in [place] last night.  Pt says her renal function was ok last night.  Sodium was low at 134.  Pt has siadh and follows with dr. Shetty.  They did not check her urine.  On 7/8 pt's cmp and cbc were normal except for elevated glucose.     She was also in the ED on 7/14.  She was dx with low back pain.  She c/o deep low back pain on both sides from her lumbar spine to her coccyx.  She c/o bilateral leg pain and occ numbness in her legs. Sx worse with bending.  She has not done physical therapy.   Sx were flared she bent over to pull up her support hose.  She was given steroids from the ED but pt didn't take them bc she was worried it would make her osteoporosis worse.  Pt allergic to flexeril and nsaids      She had another ED visit in [place] on 7/18 bc of stroke like sx and tingling in feet.  Head ct and c-spine were normal other than a 1.5cm thyroid lesion.  Pt says she is aware of the thyroid lesion.       The following portions of the patient's history were reviewed and updated as appropriate: allergies, current medications, past family history, past medical history, past social history, past surgical history and problem list.    Allergies: Caffeine, Cephalexin, Cyclobenzaprine, Evista [raloxifene], Naproxen, Alendronate, Beta adrenergic blockers, Diltiazem, Ibuprofen, Ranitidine hcl, Sugar-protein-starch, Wheat extract, Aspirin, Clarithromycin, Levofloxacin, Paxlovid [nirmatrelvir-ritonavir], and Ranitidine    Review of Systems   Constitutional: Negative.    HENT: Negative.     Eyes: Negative.    Respiratory: Negative.     Cardiovascular: Negative.     Gastrointestinal: Negative.    Endocrine: Negative.    Genitourinary: Negative.    Musculoskeletal:  Positive for back pain.   Skin: Negative.    Allergic/Immunologic: Negative.    Neurological: Negative.    Hematological: Negative.    Psychiatric/Behavioral: Negative.     All other systems reviewed and are negative.            Wt Readings from Last 3 Encounters:   07/25/25 76.5 kg (168 lb 9.6 oz)   07/14/25 76.7 kg (169 lb)   07/08/25 77.6 kg (171 lb)     Temp Readings from Last 3 Encounters:   07/25/25 98.6 °F (37 °C) (Infrared)   07/14/25 98.9 °F (37.2 °C) (Oral)   07/08/25 98.2 °F (36.8 °C) (Infrared)     BP Readings from Last 3 Encounters:   07/25/25 130/80   07/14/25 135/89   07/08/25 140/82     Pulse Readings from Last 3 Encounters:   07/25/25 77   07/14/25 75   07/08/25 74     Body mass index is 29.87 kg/m².  SpO2 Readings from Last 3 Encounters:   07/25/25 98%   07/14/25 98%   07/08/25 98%          Physical Exam  Vitals and nursing note reviewed.   Constitutional:       General: She is not in acute distress.     Appearance: She is well-developed.   HENT:      Head: Normocephalic and atraumatic.      Right Ear: External ear normal.      Left Ear: External ear normal.      Nose: Nose normal.   Eyes:      Conjunctiva/sclera: Conjunctivae normal.      Pupils: Pupils are equal, round, and reactive to light.   Cardiovascular:      Rate and Rhythm: Normal rate and regular rhythm.      Heart sounds: Normal heart sounds.   Pulmonary:      Effort: Pulmonary effort is normal. No respiratory distress.      Breath sounds: Normal breath sounds. No wheezing.   Musculoskeletal:         General: Normal range of motion.      Cervical back: Normal range of motion and neck supple.      Comments: Normal gait   Skin:     General: Skin is warm and dry.   Neurological:      Mental Status: She is alert and oriented to person, place, and time.      Comments: Patellar dtr's 2+ and normal bilat  LE strength in thighs, calves, and  feet normal and equal bilat  Slr neg bilat   Psychiatric:         Behavior: Behavior normal.         Thought Content: Thought content normal.         Judgment: Judgment normal.         Results for orders placed or performed in visit on 07/08/25   POCT urinalysis dipstick, automated    Collection Time: 07/08/25 11:52 AM    Specimen: Urine   Result Value Ref Range    Color Yellow Yellow, Straw, Dark Yellow, Julianna    Clarity, UA Clear Clear    Specific Gravity  1.015 1.005 - 1.030    pH, Urine 8.5 (A) 5.0 - 8.0    Leukocytes Negative Negative    Nitrite, UA Negative Negative    Protein, POC Negative Negative mg/dL    Glucose, UA Negative Negative mg/dL    Ketones, UA Negative Negative    Urobilinogen, UA Normal Normal, 0.2 E.U./dL    Bilirubin Negative Negative    Blood, UA Trace (A) Negative    Lot Number [id number]     Expiration Date 10-5-26    Osmolality, Urine - Urine, Clean Catch    Collection Time: 07/08/25  1:19 PM    Specimen: Urine, Clean Catch   Result Value Ref Range    Osmolality, URINE 435 mOsmol/kg   Sodium, Urine, Random - Urine, Clean Catch    Collection Time: 07/08/25  1:19 PM    Specimen: Urine, Clean Catch   Result Value Ref Range    Sodium, urine 82 Not Estab. mmol/L   Protein / Creatinine Ratio, Urine - Urine, Clean Catch    Collection Time: 07/08/25  1:19 PM    Specimen: Urine, Clean Catch   Result Value Ref Range    Creatinine, Urine 26.0 Not Estab. mg/dL    Total Protein, Urine 4.4 Not Estab. mg/dL    Protein/Creatinine Ratio 169 0 - 200 mg/g creat   Comprehensive Metabolic Panel    Collection Time: 07/08/25  1:19 PM    Specimen: Blood   Result Value Ref Range    Glucose 123 (H) 70 - 99 mg/dL    BUN 13 8 - 27 mg/dL    Creatinine 0.63 0.57 - 1.00 mg/dL    EGFR Result 93 >59 mL/min/1.73    BUN/Creatinine Ratio 21 12 - 28    Sodium 138 134 - 144 mmol/L    Potassium 4.3 3.5 - 5.2 mmol/L    Chloride 101 96 - 106 mmol/L    Total CO2 23 20 - 29 mmol/L    Calcium 9.1 8.7 - 10.3 mg/dL    Total  Protein 6.6 6.0 - 8.5 g/dL    Albumin 4.3 3.8 - 4.8 g/dL    Globulin 2.3 1.5 - 4.5 g/dL    Total Bilirubin 0.3 0.0 - 1.2 mg/dL    Alkaline Phosphatase 63 44 - 121 IU/L    AST (SGOT) 15 0 - 40 IU/L    ALT (SGPT) 15 0 - 32 IU/L   Osmolality, Serum    Collection Time: 07/08/25  1:19 PM    Specimen: Blood   Result Value Ref Range    Osmolality Serum 283 280 - 301 mOsmol/kg   CBC & Differential    Collection Time: 07/08/25  1:19 PM    Specimen: Blood   Result Value Ref Range    WBC 6.0 3.4 - 10.8 x10E3/uL    RBC 4.71 3.77 - 5.28 x10E6/uL    Hemoglobin 14.9 11.1 - 15.9 g/dL    Hematocrit 45.6 34.0 - 46.6 %    MCV 97 79 - 97 fL    MCH 31.6 26.6 - 33.0 pg    MCHC 32.7 31.5 - 35.7 g/dL    RDW 13.4 11.7 - 15.4 %    Platelets 246 150 - 450 x10E3/uL    Neutrophil Rel % 50 Not Estab. %    Lymphocyte Rel % 38 Not Estab. %    Monocyte Rel % 8 Not Estab. %    Eosinophil Rel % 4 Not Estab. %    Basophil Rel % 0 Not Estab. %    Neutrophils Absolute 2.9 1.4 - 7.0 x10E3/uL    Lymphocytes Absolute 2.3 0.7 - 3.1 x10E3/uL    Monocytes Absolute 0.5 0.1 - 0.9 x10E3/uL    Eosinophils Absolute 0.3 0.0 - 0.4 x10E3/uL    Basophils Absolute 0.0 0.0 - 0.2 x10E3/uL    Immature Granulocyte Rel % 0 Not Estab. %    Immature Grans Absolute 0.0 0.0 - 0.1 x10E3/uL     Result Review :       Data reviewed : Radiologic studies ct head and c-spine 7/18, lumbar xray below     DATE:  01/08/2025     HISTORY:  Low back pain.     EXAMINATION:  LUMBAR SPINE AP AND LATERAL     COMPARISON:  Spine radiographs April 11, 2024     FINDINGS/CONCLUSION(S):    1.Dextrocurvature of the lumbar spine centered at L2.   2.Mild disc height loss at L1-L2.   3.Schmorl's node formation within the superior endplate of L2.   Otherwise, vertebral body heights are maintained.   4.Mild facet degenerative changes at L3-L4. Moderate facet   degenerative changes at L4-L5 and L5-S1.       Dictated by: Terry Cuellar M.D.     Images and Report reviewed and interpreted by: Terry  LEIDY Cuellar.       Assessment and Plan    Diagnoses and all orders for this visit:    1. Urinary frequency (Primary) - no UTI.  Pt denies constipation.  Encouraged high fiber diet to help prevent constipation.  F/u with nephrology  -     POCT urinalysis dipstick, automated    2. Osteoarthritis of spine with radiculopathy, lumbar region  -     Ambulatory Referral to Physical Therapy for Evaluation & Treatment    Take short term prednisone course and tylenol.  Allergic to nsaids and flexeril. MRI in 1 month if not improving with physical therapy.                  Outpatient Medications Prior to Visit   Medication Sig Dispense Refill    albuterol sulfate  (90 Base) MCG/ACT inhaler Inhale 2 puffs Every 4 (Four) Hours As Needed for Wheezing or Shortness of Air. 18 g 8    ascorbic acid (VITAMIN C) 250 MG tablet Take 1 tablet by mouth Daily.      aspirin 81 MG EC tablet Take 1 tablet by mouth Daily. Takes 1 tablet about every 3 days (Patient taking differently: Take 1 tablet by mouth Daily. Takes one tablet every 3 weeks or as needed)      b complex vitamins capsule Take 1 capsule by mouth.      CALCIUM PO Take 720 mg by mouth Daily.      cholecalciferol (Vitamin D, Cholecalciferol,) 25 MCG (1000 UT) tablet Take 5 tablets by mouth Daily.      diazePAM (VALIUM) 5 MG tablet Take 1 tablet by mouth Every 6 (Six) Hours As Needed for Muscle Spasms (back pain). 12 tablet 0    predniSONE (DELTASONE) 20 MG tablet Take 3 tabs p.o. daily on days 1-2, then 2 tabs p.o. on days 3-4, then 1 tab p.o. on days 5-6. You had your first dose in the ED so you will not need to start this prescription until 7/15/25 12 tablet 0     No facility-administered medications prior to visit.     No orders of the defined types were placed in this encounter.    [unfilled]  There are no discontinued medications.      Return in about 1 month (around 8/25/2025) for Recheck.    Patient was given instructions and counseling regarding her condition  or for health maintenance advice. Please see specific information pulled into the AVS if appropriate.

## 2025-07-29 ENCOUNTER — TELEPHONE (OUTPATIENT)
Dept: INTERNAL MEDICINE | Facility: CLINIC | Age: 75
End: 2025-07-29

## 2025-08-05 LAB
CV ZIO BASELINE AVG BPM: 81 BPM
CV ZIO BASELINE BPM HIGH: 190 BPM
CV ZIO BASELINE BPM LOW: 51 BPM
CV ZIO DEVICE ANALYSIS TIME: NORMAL
CV ZIO ECT SVE COUNT: NORMAL EPISODES
CV ZIO ECT SVE CPLT COUNT: 744 EPISODES
CV ZIO ECT SVE CPLT FREQ: NORMAL
CV ZIO ECT SVE FREQ: NORMAL
CV ZIO ECT SVE TPLT COUNT: 17 EPISODES
CV ZIO ECT SVE TPLT FREQ: NORMAL
CV ZIO ECT VE COUNT: 214 EPISODES
CV ZIO ECT VE CPLT COUNT: 0 EPISODES
CV ZIO ECT VE CPLT FREQ: 0
CV ZIO ECT VE FREQ: NORMAL
CV ZIO ECT VE TPLT COUNT: 0 EPISODES
CV ZIO ECT VE TPLT FREQ: 0
CV ZIO ECTOPIC SVE COUPLET RAW PERCENT: 0.18 %
CV ZIO ECTOPIC SVE ISOLATED PERCENT: 4.04 %
CV ZIO ECTOPIC SVE TRIPLET RAW PERCENT: 0.01 %
CV ZIO ECTOPIC VE COUPLET RAW PERCENT: 0 %
CV ZIO ECTOPIC VE ISOLATED PERCENT: 0.03 %
CV ZIO ECTOPIC VE TRIPLET RAW PERCENT: 0 %
CV ZIO ENROLLMENT END: NORMAL
CV ZIO ENROLLMENT START: NORMAL
CV ZIO PATIENT EVENTS DIARIES: 16
CV ZIO PATIENT EVENTS TRIGGERS: 5
CV ZIO PAUSE COUNT: 0
CV ZIO PRESCRIPTION STATUS: NORMAL
CV ZIO SVT AVG BPM: 135 BPM
CV ZIO SVT BPM HIGH: 190 BPM
CV ZIO SVT BPM LOW: 86 BPM
CV ZIO SVT COUNT: 18
CV ZIO SVT F EPI AVG BPM: 173 BPM
CV ZIO SVT F EPI BEATS: 4 BEATS
CV ZIO SVT F EPI BPM HIGH: 190 BPM
CV ZIO SVT F EPI BPM LOW: 156 BPM
CV ZIO SVT F EPI DUR: 1.4 SEC
CV ZIO SVT F EPI END: NORMAL
CV ZIO SVT F EPI START: NORMAL
CV ZIO SVT L EPI AVG BPM: 136 BPM
CV ZIO SVT L EPI BEATS: 45 BEATS
CV ZIO SVT L EPI BPM HIGH: 148 BPM
CV ZIO SVT L EPI BPM LOW: 118 BPM
CV ZIO SVT L EPI DUR: 20.1 SEC
CV ZIO SVT L EPI END: NORMAL
CV ZIO SVT L EPI START: NORMAL
CV ZIO SVT SYMPT IN PT: NORMAL
CV ZIO TOTAL  ENROLLMENT PERIOD: NORMAL
CV ZIO VT COUNT: 0

## 2025-08-06 ENCOUNTER — RESULTS FOLLOW-UP (OUTPATIENT)
Age: 75
End: 2025-08-06
Payer: COMMERCIAL

## 2025-08-06 ENCOUNTER — OFFICE VISIT (OUTPATIENT)
Dept: CARDIOLOGY | Facility: CLINIC | Age: 75
End: 2025-08-06
Payer: COMMERCIAL

## 2025-08-06 VITALS
HEIGHT: 63 IN | SYSTOLIC BLOOD PRESSURE: 120 MMHG | BODY MASS INDEX: 29.86 KG/M2 | WEIGHT: 168.5 LBS | DIASTOLIC BLOOD PRESSURE: 82 MMHG | HEART RATE: 68 BPM

## 2025-08-06 DIAGNOSIS — R00.2 PALPITATIONS: ICD-10-CM

## 2025-08-06 DIAGNOSIS — I47.19 ATRIAL TACHYCARDIA: Primary | ICD-10-CM

## 2025-08-06 PROCEDURE — 93000 ELECTROCARDIOGRAM COMPLETE: CPT | Performed by: STUDENT IN AN ORGANIZED HEALTH CARE EDUCATION/TRAINING PROGRAM

## 2025-08-06 PROCEDURE — 99214 OFFICE O/P EST MOD 30 MIN: CPT | Performed by: STUDENT IN AN ORGANIZED HEALTH CARE EDUCATION/TRAINING PROGRAM

## 2025-08-06 PROCEDURE — 1159F MED LIST DOCD IN RCRD: CPT | Performed by: STUDENT IN AN ORGANIZED HEALTH CARE EDUCATION/TRAINING PROGRAM

## 2025-08-06 PROCEDURE — 1160F RVW MEDS BY RX/DR IN RCRD: CPT | Performed by: STUDENT IN AN ORGANIZED HEALTH CARE EDUCATION/TRAINING PROGRAM

## 2025-08-06 RX ORDER — VERAPAMIL HYDROCHLORIDE 40 MG/1
20 TABLET ORAL 3 TIMES DAILY PRN
Qty: 30 TABLET | Refills: 2 | Status: SHIPPED | OUTPATIENT
Start: 2025-08-06 | End: 2025-09-05

## 2025-08-18 ENCOUNTER — OFFICE VISIT (OUTPATIENT)
Dept: INTERNAL MEDICINE | Facility: CLINIC | Age: 75
End: 2025-08-18
Payer: COMMERCIAL

## 2025-08-18 VITALS
WEIGHT: 165.2 LBS | HEART RATE: 78 BPM | TEMPERATURE: 98.6 F | RESPIRATION RATE: 16 BRPM | BODY MASS INDEX: 29.27 KG/M2 | DIASTOLIC BLOOD PRESSURE: 74 MMHG | OXYGEN SATURATION: 99 % | HEIGHT: 63 IN | SYSTOLIC BLOOD PRESSURE: 120 MMHG

## 2025-08-18 DIAGNOSIS — G89.29 CHRONIC RADICULAR LUMBAR PAIN: ICD-10-CM

## 2025-08-18 DIAGNOSIS — Z00.00 MEDICARE ANNUAL WELLNESS VISIT, SUBSEQUENT: Primary | ICD-10-CM

## 2025-08-18 DIAGNOSIS — E04.1 THYROID NODULE: ICD-10-CM

## 2025-08-18 DIAGNOSIS — M54.16 CHRONIC RADICULAR LUMBAR PAIN: ICD-10-CM

## 2025-08-18 PROCEDURE — G0439 PPPS, SUBSEQ VISIT: HCPCS | Performed by: INTERNAL MEDICINE

## 2025-08-18 PROCEDURE — 1125F AMNT PAIN NOTED PAIN PRSNT: CPT | Performed by: INTERNAL MEDICINE

## 2025-08-18 PROCEDURE — 1159F MED LIST DOCD IN RCRD: CPT | Performed by: INTERNAL MEDICINE

## 2025-08-18 PROCEDURE — 1160F RVW MEDS BY RX/DR IN RCRD: CPT | Performed by: INTERNAL MEDICINE

## 2025-08-19 ENCOUNTER — HOSPITAL ENCOUNTER (OUTPATIENT)
Dept: CARDIOLOGY | Facility: HOSPITAL | Age: 75
Discharge: HOME | End: 2025-08-19
Admitting: STUDENT IN AN ORGANIZED HEALTH CARE EDUCATION/TRAINING PROGRAM
Payer: COMMERCIAL

## 2025-08-19 DIAGNOSIS — R00.2 PALPITATIONS: ICD-10-CM

## 2025-08-19 DIAGNOSIS — I47.19 ATRIAL TACHYCARDIA: ICD-10-CM

## 2025-08-19 LAB
BH CV STRESS BP STAGE 1: NORMAL
BH CV STRESS DURATION MIN STAGE 1: 3
BH CV STRESS DURATION SEC STAGE 1: 0
BH CV STRESS GRADE STAGE 1: 10
BH CV STRESS HR STAGE 1: 143
BH CV STRESS METS STAGE 1: 4.6
BH CV STRESS PROTOCOL 1: NORMAL
BH CV STRESS RECOVERY BP: NORMAL MMHG
BH CV STRESS RECOVERY HR: 82 BPM
BH CV STRESS SPEED STAGE 1: 1.7
BH CV STRESS STAGE 1: 1
MAXIMAL PREDICTED HEART RATE: 145 BPM
PERCENT MAX PREDICTED HR: 100 %
STRESS BASELINE BP: NORMAL MMHG
STRESS BASELINE HR: 74 BPM
STRESS O2 SAT REST: 98 %
STRESS PERCENT HR: 118 %
STRESS POST ESTIMATED WORKLOAD: 4.6 METS
STRESS POST EXERCISE DUR MIN: 3 MIN
STRESS POST EXERCISE DUR SEC: 0 SEC
STRESS POST O2 SAT PEAK: 98 %
STRESS POST PEAK BP: NORMAL MMHG
STRESS POST PEAK HR: 145 BPM
STRESS TARGET HR: 123 BPM

## 2025-08-19 PROCEDURE — 93017 CV STRESS TEST TRACING ONLY: CPT

## 2025-08-22 ENCOUNTER — TRANSCRIBE ORDERS (OUTPATIENT)
Dept: MRI IMAGING | Facility: HOSPITAL | Age: 75
End: 2025-08-22
Payer: COMMERCIAL

## 2025-08-22 DIAGNOSIS — M47.816 LUMBAR SPONDYLOSIS: Primary | ICD-10-CM

## 2025-08-25 ENCOUNTER — TRANSCRIBE ORDERS (OUTPATIENT)
Dept: ADMINISTRATIVE | Facility: HOSPITAL | Age: 75
End: 2025-08-25
Payer: COMMERCIAL

## 2025-08-25 DIAGNOSIS — R20.2 TINGLING: ICD-10-CM

## 2025-08-25 DIAGNOSIS — R20.0 NUMBNESS: Primary | ICD-10-CM
